# Patient Record
Sex: MALE | Race: WHITE | Employment: FULL TIME | URBAN - METROPOLITAN AREA
[De-identification: names, ages, dates, MRNs, and addresses within clinical notes are randomized per-mention and may not be internally consistent; named-entity substitution may affect disease eponyms.]

---

## 2021-09-27 ENCOUNTER — HOSPITAL ENCOUNTER (EMERGENCY)
Age: 51
Discharge: ANOTHER HOSPITAL | End: 2021-09-27
Attending: EMERGENCY MEDICINE

## 2021-09-27 ENCOUNTER — ANESTHESIA EVENT (OUTPATIENT)
Dept: OPERATING ROOM | Age: 51
DRG: 513 | End: 2021-09-27

## 2021-09-27 ENCOUNTER — HOSPITAL ENCOUNTER (INPATIENT)
Age: 51
LOS: 2 days | Discharge: HOME | DRG: 513 | End: 2021-09-29
Attending: ORTHOPAEDIC SURGERY | Admitting: FAMILY MEDICINE

## 2021-09-27 ENCOUNTER — APPOINTMENT (OUTPATIENT)
Dept: GENERAL RADIOLOGY | Age: 51
End: 2021-09-27

## 2021-09-27 ENCOUNTER — ANESTHESIA (OUTPATIENT)
Dept: OPERATING ROOM | Age: 51
DRG: 513 | End: 2021-09-27

## 2021-09-27 VITALS
RESPIRATION RATE: 18 BRPM | SYSTOLIC BLOOD PRESSURE: 139 MMHG | BODY MASS INDEX: 35.33 KG/M2 | HEIGHT: 69 IN | DIASTOLIC BLOOD PRESSURE: 87 MMHG | WEIGHT: 238.54 LBS | OXYGEN SATURATION: 97 % | HEART RATE: 97 BPM | TEMPERATURE: 98.9 F

## 2021-09-27 VITALS
DIASTOLIC BLOOD PRESSURE: 57 MMHG | OXYGEN SATURATION: 100 % | SYSTOLIC BLOOD PRESSURE: 93 MMHG | TEMPERATURE: 97.7 F | RESPIRATION RATE: 2 BRPM

## 2021-09-27 DIAGNOSIS — L03.012 CELLULITIS OF LEFT MIDDLE FINGER: ICD-10-CM

## 2021-09-27 DIAGNOSIS — E11.65 TYPE 2 DIABETES MELLITUS WITH HYPERGLYCEMIA, WITHOUT LONG-TERM CURRENT USE OF INSULIN (HCC): ICD-10-CM

## 2021-09-27 DIAGNOSIS — L02.512 ABSCESS OF HAND, LEFT: Primary | ICD-10-CM

## 2021-09-27 DIAGNOSIS — M65.142 SUPPURATIVE TENOSYNOVITIS OF FLEXOR TENDON OF LEFT HAND: ICD-10-CM

## 2021-09-27 DIAGNOSIS — L02.512 ABSCESS OF LEFT MIDDLE FINGER: Primary | ICD-10-CM

## 2021-09-27 PROBLEM — L03.114 CELLULITIS OF LEFT HAND: Status: ACTIVE | Noted: 2021-09-27

## 2021-09-27 LAB
A/G RATIO: 1 (ref 1.1–2.2)
ALBUMIN SERPL-MCNC: 4.1 G/DL (ref 3.4–5)
ALP BLD-CCNC: 95 U/L (ref 40–129)
ALT SERPL-CCNC: 13 U/L (ref 10–40)
ANION GAP SERPL CALCULATED.3IONS-SCNC: 13 MMOL/L (ref 3–16)
AST SERPL-CCNC: 15 U/L (ref 15–37)
BASOPHILS ABSOLUTE: 0 K/UL (ref 0–0.2)
BASOPHILS RELATIVE PERCENT: 0.6 %
BILIRUB SERPL-MCNC: 0.6 MG/DL (ref 0–1)
BUN BLDV-MCNC: 16 MG/DL (ref 7–20)
CALCIUM SERPL-MCNC: 9.9 MG/DL (ref 8.3–10.6)
CHLORIDE BLD-SCNC: 98 MMOL/L (ref 99–110)
CO2: 23 MMOL/L (ref 21–32)
CREAT SERPL-MCNC: 0.6 MG/DL (ref 0.9–1.3)
EOSINOPHILS ABSOLUTE: 0.2 K/UL (ref 0–0.6)
EOSINOPHILS RELATIVE PERCENT: 3 %
GFR AFRICAN AMERICAN: >60
GFR NON-AFRICAN AMERICAN: >60
GLOBULIN: 4.1 G/DL
GLUCOSE BLD-MCNC: 107 MG/DL (ref 70–99)
GLUCOSE BLD-MCNC: 265 MG/DL (ref 70–99)
GLUCOSE BLD-MCNC: 278 MG/DL (ref 70–99)
GLUCOSE BLD-MCNC: 281 MG/DL (ref 70–99)
GLUCOSE BLD-MCNC: 344 MG/DL (ref 70–99)
HCT VFR BLD CALC: 47.7 % (ref 40.5–52.5)
HEMOGLOBIN: 15.4 G/DL (ref 13.5–17.5)
LACTIC ACID: 1.3 MMOL/L (ref 0.4–2)
LYMPHOCYTES ABSOLUTE: 1.8 K/UL (ref 1–5.1)
LYMPHOCYTES RELATIVE PERCENT: 22.3 %
MCH RBC QN AUTO: 29.3 PG (ref 26–34)
MCHC RBC AUTO-ENTMCNC: 32.3 G/DL (ref 31–36)
MCV RBC AUTO: 90.8 FL (ref 80–100)
MONOCYTES ABSOLUTE: 0.4 K/UL (ref 0–1.3)
MONOCYTES RELATIVE PERCENT: 5 %
NEUTROPHILS ABSOLUTE: 5.8 K/UL (ref 1.7–7.7)
NEUTROPHILS RELATIVE PERCENT: 69.1 %
PDW BLD-RTO: 12.1 % (ref 12.4–15.4)
PERFORMED ON: ABNORMAL
PLATELET # BLD: 392 K/UL (ref 135–450)
PMV BLD AUTO: 6.8 FL (ref 5–10.5)
POTASSIUM SERPL-SCNC: 3.9 MMOL/L (ref 3.5–5.1)
RBC # BLD: 5.26 M/UL (ref 4.2–5.9)
SARS-COV-2, NAAT: NOT DETECTED
SODIUM BLD-SCNC: 134 MMOL/L (ref 136–145)
TOTAL PROTEIN: 8.2 G/DL (ref 6.4–8.2)
WBC # BLD: 8.2 K/UL (ref 4–11)

## 2021-09-27 PROCEDURE — 6360000002 HC RX W HCPCS: Performed by: NURSE PRACTITIONER

## 2021-09-27 PROCEDURE — 7100000001 HC PACU RECOVERY - ADDTL 15 MIN: Performed by: ORTHOPAEDIC SURGERY

## 2021-09-27 PROCEDURE — 87635 SARS-COV-2 COVID-19 AMP PRB: CPT

## 2021-09-27 PROCEDURE — 6370000000 HC RX 637 (ALT 250 FOR IP): Performed by: NURSE PRACTITIONER

## 2021-09-27 PROCEDURE — 2500000003 HC RX 250 WO HCPCS: Performed by: NURSE ANESTHETIST, CERTIFIED REGISTERED

## 2021-09-27 PROCEDURE — 6370000000 HC RX 637 (ALT 250 FOR IP): Performed by: FAMILY MEDICINE

## 2021-09-27 PROCEDURE — 96365 THER/PROPH/DIAG IV INF INIT: CPT

## 2021-09-27 PROCEDURE — 3600000013 HC SURGERY LEVEL 3 ADDTL 15MIN: Performed by: ORTHOPAEDIC SURGERY

## 2021-09-27 PROCEDURE — 2580000003 HC RX 258: Performed by: NURSE PRACTITIONER

## 2021-09-27 PROCEDURE — 2709999900 HC NON-CHARGEABLE SUPPLY: Performed by: ORTHOPAEDIC SURGERY

## 2021-09-27 PROCEDURE — 6360000002 HC RX W HCPCS: Performed by: EMERGENCY MEDICINE

## 2021-09-27 PROCEDURE — 87205 SMEAR GRAM STAIN: CPT

## 2021-09-27 PROCEDURE — 87070 CULTURE OTHR SPECIMN AEROBIC: CPT

## 2021-09-27 PROCEDURE — 73140 X-RAY EXAM OF FINGER(S): CPT

## 2021-09-27 PROCEDURE — 83605 ASSAY OF LACTIC ACID: CPT

## 2021-09-27 PROCEDURE — 6360000002 HC RX W HCPCS: Performed by: NURSE ANESTHETIST, CERTIFIED REGISTERED

## 2021-09-27 PROCEDURE — 26020 DRAIN HAND TENDON SHEATH: CPT | Performed by: ORTHOPAEDIC SURGERY

## 2021-09-27 PROCEDURE — 0JBK0ZZ EXCISION OF LEFT HAND SUBCUTANEOUS TISSUE AND FASCIA, OPEN APPROACH: ICD-10-PCS | Performed by: ORTHOPAEDIC SURGERY

## 2021-09-27 PROCEDURE — 87075 CULTR BACTERIA EXCEPT BLOOD: CPT

## 2021-09-27 PROCEDURE — 7100000000 HC PACU RECOVERY - FIRST 15 MIN: Performed by: ORTHOPAEDIC SURGERY

## 2021-09-27 PROCEDURE — 87077 CULTURE AEROBIC IDENTIFY: CPT

## 2021-09-27 PROCEDURE — 1200000000 HC SEMI PRIVATE

## 2021-09-27 PROCEDURE — 36415 COLL VENOUS BLD VENIPUNCTURE: CPT

## 2021-09-27 PROCEDURE — 85025 COMPLETE CBC W/AUTO DIFF WBC: CPT

## 2021-09-27 PROCEDURE — 87186 SC STD MICRODIL/AGAR DIL: CPT

## 2021-09-27 PROCEDURE — 6370000000 HC RX 637 (ALT 250 FOR IP): Performed by: ORTHOPAEDIC SURGERY

## 2021-09-27 PROCEDURE — 80053 COMPREHEN METABOLIC PANEL: CPT

## 2021-09-27 PROCEDURE — 2580000003 HC RX 258: Performed by: EMERGENCY MEDICINE

## 2021-09-27 PROCEDURE — 3700000001 HC ADD 15 MINUTES (ANESTHESIA): Performed by: ORTHOPAEDIC SURGERY

## 2021-09-27 PROCEDURE — 87040 BLOOD CULTURE FOR BACTERIA: CPT

## 2021-09-27 PROCEDURE — 6360000002 HC RX W HCPCS: Performed by: ORTHOPAEDIC SURGERY

## 2021-09-27 PROCEDURE — 99222 1ST HOSP IP/OBS MODERATE 55: CPT | Performed by: ORTHOPAEDIC SURGERY

## 2021-09-27 PROCEDURE — 99284 EMERGENCY DEPT VISIT MOD MDM: CPT

## 2021-09-27 PROCEDURE — 3600000003 HC SURGERY LEVEL 3 BASE: Performed by: ORTHOPAEDIC SURGERY

## 2021-09-27 PROCEDURE — 3700000000 HC ANESTHESIA ATTENDED CARE: Performed by: ORTHOPAEDIC SURGERY

## 2021-09-27 PROCEDURE — 2580000003 HC RX 258: Performed by: ORTHOPAEDIC SURGERY

## 2021-09-27 PROCEDURE — 87076 CULTURE ANAEROBE IDENT EACH: CPT

## 2021-09-27 RX ORDER — MEPERIDINE HYDROCHLORIDE 25 MG/ML
12.5 INJECTION INTRAMUSCULAR; INTRAVENOUS; SUBCUTANEOUS EVERY 5 MIN PRN
Status: DISCONTINUED | OUTPATIENT
Start: 2021-09-27 | End: 2021-09-27

## 2021-09-27 RX ORDER — SODIUM CHLORIDE 0.9 % (FLUSH) 0.9 %
10 SYRINGE (ML) INJECTION EVERY 12 HOURS SCHEDULED
Status: DISCONTINUED | OUTPATIENT
Start: 2021-09-27 | End: 2021-09-29 | Stop reason: HOSPADM

## 2021-09-27 RX ORDER — INSULIN LISPRO 100 [IU]/ML
0-3 INJECTION, SOLUTION INTRAVENOUS; SUBCUTANEOUS NIGHTLY
Status: DISCONTINUED | OUTPATIENT
Start: 2021-09-27 | End: 2021-09-29 | Stop reason: HOSPADM

## 2021-09-27 RX ORDER — ONDANSETRON 2 MG/ML
4 INJECTION INTRAMUSCULAR; INTRAVENOUS EVERY 6 HOURS PRN
Status: DISCONTINUED | OUTPATIENT
Start: 2021-09-27 | End: 2021-09-29 | Stop reason: HOSPADM

## 2021-09-27 RX ORDER — FENTANYL CITRATE 50 UG/ML
INJECTION, SOLUTION INTRAMUSCULAR; INTRAVENOUS PRN
Status: DISCONTINUED | OUTPATIENT
Start: 2021-09-27 | End: 2021-09-27 | Stop reason: SDUPTHER

## 2021-09-27 RX ORDER — SODIUM CHLORIDE 0.9 % (FLUSH) 0.9 %
10 SYRINGE (ML) INJECTION PRN
Status: DISCONTINUED | OUTPATIENT
Start: 2021-09-27 | End: 2021-09-29 | Stop reason: HOSPADM

## 2021-09-27 RX ORDER — CEPHALEXIN 500 MG/1
CAPSULE ORAL
Status: ON HOLD | COMMUNITY
Start: 2021-09-20 | End: 2021-09-27

## 2021-09-27 RX ORDER — PROPOFOL 10 MG/ML
INJECTION, EMULSION INTRAVENOUS PRN
Status: DISCONTINUED | OUTPATIENT
Start: 2021-09-27 | End: 2021-09-27 | Stop reason: SDUPTHER

## 2021-09-27 RX ORDER — PROMETHAZINE HYDROCHLORIDE 25 MG/ML
6.25 INJECTION, SOLUTION INTRAMUSCULAR; INTRAVENOUS PRN
Status: DISCONTINUED | OUTPATIENT
Start: 2021-09-27 | End: 2021-09-27

## 2021-09-27 RX ORDER — DEXTROSE MONOHYDRATE 50 MG/ML
100 INJECTION, SOLUTION INTRAVENOUS PRN
Status: DISCONTINUED | OUTPATIENT
Start: 2021-09-27 | End: 2021-09-29 | Stop reason: HOSPADM

## 2021-09-27 RX ORDER — INSULIN LISPRO 100 [IU]/ML
0.08 INJECTION, SOLUTION INTRAVENOUS; SUBCUTANEOUS
Status: DISCONTINUED | OUTPATIENT
Start: 2021-09-27 | End: 2021-09-29 | Stop reason: HOSPADM

## 2021-09-27 RX ORDER — PROMETHAZINE HYDROCHLORIDE 25 MG/1
12.5 TABLET ORAL EVERY 6 HOURS PRN
Status: DISCONTINUED | OUTPATIENT
Start: 2021-09-27 | End: 2021-09-29 | Stop reason: HOSPADM

## 2021-09-27 RX ORDER — CEFAZOLIN SODIUM 1 G/3ML
INJECTION, POWDER, FOR SOLUTION INTRAMUSCULAR; INTRAVENOUS PRN
Status: DISCONTINUED | OUTPATIENT
Start: 2021-09-27 | End: 2021-09-27 | Stop reason: SDUPTHER

## 2021-09-27 RX ORDER — BACITRACIN ZINC AND POLYMYXIN B SULFATE 500; 1000 [USP'U]/G; [USP'U]/G
OINTMENT TOPICAL
Status: COMPLETED | OUTPATIENT
Start: 2021-09-27 | End: 2021-09-27

## 2021-09-27 RX ORDER — 0.9 % SODIUM CHLORIDE 0.9 %
1000 INTRAVENOUS SOLUTION INTRAVENOUS ONCE
Status: COMPLETED | OUTPATIENT
Start: 2021-09-27 | End: 2021-09-27

## 2021-09-27 RX ORDER — OXYCODONE HYDROCHLORIDE 5 MG/1
5 TABLET ORAL PRN
Status: DISCONTINUED | OUTPATIENT
Start: 2021-09-27 | End: 2021-09-27

## 2021-09-27 RX ORDER — FENTANYL CITRATE 50 UG/ML
50 INJECTION, SOLUTION INTRAMUSCULAR; INTRAVENOUS EVERY 5 MIN PRN
Status: DISCONTINUED | OUTPATIENT
Start: 2021-09-27 | End: 2021-09-27

## 2021-09-27 RX ORDER — SODIUM CHLORIDE 9 MG/ML
INJECTION, SOLUTION INTRAVENOUS CONTINUOUS
Status: DISCONTINUED | OUTPATIENT
Start: 2021-09-27 | End: 2021-09-28

## 2021-09-27 RX ORDER — DIPHENHYDRAMINE HYDROCHLORIDE 50 MG/ML
12.5 INJECTION INTRAMUSCULAR; INTRAVENOUS
Status: DISCONTINUED | OUTPATIENT
Start: 2021-09-27 | End: 2021-09-27

## 2021-09-27 RX ORDER — SODIUM CHLORIDE 9 MG/ML
25 INJECTION, SOLUTION INTRAVENOUS PRN
Status: DISCONTINUED | OUTPATIENT
Start: 2021-09-27 | End: 2021-09-29 | Stop reason: HOSPADM

## 2021-09-27 RX ORDER — ACETAMINOPHEN 325 MG/1
650 TABLET ORAL 3 TIMES DAILY
Status: DISCONTINUED | OUTPATIENT
Start: 2021-09-27 | End: 2021-09-29 | Stop reason: HOSPADM

## 2021-09-27 RX ORDER — LIDOCAINE HYDROCHLORIDE 20 MG/ML
INJECTION, SOLUTION EPIDURAL; INFILTRATION; INTRACAUDAL; PERINEURAL PRN
Status: DISCONTINUED | OUTPATIENT
Start: 2021-09-27 | End: 2021-09-27 | Stop reason: SDUPTHER

## 2021-09-27 RX ORDER — HYDROMORPHONE HCL 110MG/55ML
0.5 PATIENT CONTROLLED ANALGESIA SYRINGE INTRAVENOUS EVERY 5 MIN PRN
Status: DISCONTINUED | OUTPATIENT
Start: 2021-09-27 | End: 2021-09-27

## 2021-09-27 RX ORDER — HYDROMORPHONE HCL 110MG/55ML
0.25 PATIENT CONTROLLED ANALGESIA SYRINGE INTRAVENOUS EVERY 5 MIN PRN
Status: DISCONTINUED | OUTPATIENT
Start: 2021-09-27 | End: 2021-09-27

## 2021-09-27 RX ORDER — LABETALOL HYDROCHLORIDE 5 MG/ML
5 INJECTION, SOLUTION INTRAVENOUS EVERY 10 MIN PRN
Status: DISCONTINUED | OUTPATIENT
Start: 2021-09-27 | End: 2021-09-27

## 2021-09-27 RX ORDER — DEXTROSE MONOHYDRATE 25 G/50ML
12.5 INJECTION, SOLUTION INTRAVENOUS PRN
Status: DISCONTINUED | OUTPATIENT
Start: 2021-09-27 | End: 2021-09-29 | Stop reason: HOSPADM

## 2021-09-27 RX ORDER — MIDAZOLAM HYDROCHLORIDE 1 MG/ML
INJECTION INTRAMUSCULAR; INTRAVENOUS PRN
Status: DISCONTINUED | OUTPATIENT
Start: 2021-09-27 | End: 2021-09-27 | Stop reason: SDUPTHER

## 2021-09-27 RX ORDER — OXYCODONE HYDROCHLORIDE 5 MG/1
10 TABLET ORAL PRN
Status: DISCONTINUED | OUTPATIENT
Start: 2021-09-27 | End: 2021-09-27

## 2021-09-27 RX ORDER — SULFAMETHOXAZOLE AND TRIMETHOPRIM 800; 160 MG/1; MG/1
TABLET ORAL
Status: ON HOLD | COMMUNITY
Start: 2021-09-20 | End: 2021-09-27

## 2021-09-27 RX ORDER — INSULIN LISPRO 100 [IU]/ML
0-6 INJECTION, SOLUTION INTRAVENOUS; SUBCUTANEOUS
Status: DISCONTINUED | OUTPATIENT
Start: 2021-09-27 | End: 2021-09-29 | Stop reason: HOSPADM

## 2021-09-27 RX ORDER — HYDROCODONE BITARTRATE AND ACETAMINOPHEN 10; 325 MG/1; MG/1
1 TABLET ORAL EVERY 4 HOURS PRN
Status: DISCONTINUED | OUTPATIENT
Start: 2021-09-27 | End: 2021-09-29 | Stop reason: HOSPADM

## 2021-09-27 RX ORDER — METOPROLOL TARTRATE 5 MG/5ML
INJECTION INTRAVENOUS PRN
Status: DISCONTINUED | OUTPATIENT
Start: 2021-09-27 | End: 2021-09-27 | Stop reason: SDUPTHER

## 2021-09-27 RX ORDER — POLYETHYLENE GLYCOL 3350 17 G/17G
17 POWDER, FOR SOLUTION ORAL DAILY PRN
Status: DISCONTINUED | OUTPATIENT
Start: 2021-09-27 | End: 2021-09-29 | Stop reason: HOSPADM

## 2021-09-27 RX ORDER — NICOTINE POLACRILEX 4 MG
15 LOZENGE BUCCAL PRN
Status: DISCONTINUED | OUTPATIENT
Start: 2021-09-27 | End: 2021-09-29 | Stop reason: HOSPADM

## 2021-09-27 RX ADMIN — SODIUM CHLORIDE 1000 ML: 9 INJECTION, SOLUTION INTRAVENOUS at 11:01

## 2021-09-27 RX ADMIN — HYDROCODONE BITARTRATE AND ACETAMINOPHEN 1 TABLET: 10; 325 TABLET ORAL at 21:59

## 2021-09-27 RX ADMIN — SODIUM CHLORIDE: 9 INJECTION, SOLUTION INTRAVENOUS at 14:49

## 2021-09-27 RX ADMIN — MIDAZOLAM 2 MG: 1 INJECTION INTRAMUSCULAR; INTRAVENOUS at 14:46

## 2021-09-27 RX ADMIN — METOPROLOL TARTRATE 1 MG: 1 INJECTION, SOLUTION INTRAVENOUS at 14:48

## 2021-09-27 RX ADMIN — INSULIN GLARGINE 16 UNITS: 100 INJECTION, SOLUTION SUBCUTANEOUS at 22:16

## 2021-09-27 RX ADMIN — ACETAMINOPHEN 650 MG: 325 TABLET ORAL at 17:30

## 2021-09-27 RX ADMIN — INSULIN HUMAN 10 UNITS: 100 INJECTION, SOLUTION PARENTERAL at 15:26

## 2021-09-27 RX ADMIN — CEFAZOLIN 2000 MG: 1 INJECTION, POWDER, FOR SOLUTION INTRAVENOUS at 15:06

## 2021-09-27 RX ADMIN — SODIUM CHLORIDE 100 ML: 9 INJECTION, SOLUTION INTRAVENOUS at 14:34

## 2021-09-27 RX ADMIN — CEFAZOLIN 2000 MG: 10 INJECTION, POWDER, FOR SOLUTION INTRAVENOUS at 22:02

## 2021-09-27 RX ADMIN — VANCOMYCIN HYDROCHLORIDE 1000 MG: 1 INJECTION, POWDER, LYOPHILIZED, FOR SOLUTION INTRAVENOUS at 11:05

## 2021-09-27 RX ADMIN — Medication 10 ML: at 21:59

## 2021-09-27 RX ADMIN — SODIUM CHLORIDE: 9 INJECTION, SOLUTION INTRAVENOUS at 16:24

## 2021-09-27 RX ADMIN — PROPOFOL 150 MG: 10 INJECTION, EMULSION INTRAVENOUS at 14:50

## 2021-09-27 RX ADMIN — SODIUM CHLORIDE: 9 INJECTION, SOLUTION INTRAVENOUS at 18:26

## 2021-09-27 RX ADMIN — INSULIN LISPRO 8 UNITS: 100 INJECTION, SOLUTION INTRAVENOUS; SUBCUTANEOUS at 18:11

## 2021-09-27 RX ADMIN — FENTANYL CITRATE 100 MCG: 50 INJECTION, SOLUTION INTRAMUSCULAR; INTRAVENOUS at 14:49

## 2021-09-27 RX ADMIN — INSULIN LISPRO 3 UNITS: 100 INJECTION, SOLUTION INTRAVENOUS; SUBCUTANEOUS at 17:31

## 2021-09-27 RX ADMIN — LIDOCAINE HYDROCHLORIDE 100 MG: 20 INJECTION, SOLUTION EPIDURAL; INFILTRATION; INTRACAUDAL; PERINEURAL at 14:50

## 2021-09-27 ASSESSMENT — PULMONARY FUNCTION TESTS
PIF_VALUE: 9
PIF_VALUE: 4
PIF_VALUE: 3
PIF_VALUE: 4
PIF_VALUE: 0
PIF_VALUE: 1
PIF_VALUE: 4
PIF_VALUE: 7
PIF_VALUE: 3
PIF_VALUE: 14
PIF_VALUE: 4
PIF_VALUE: 13
PIF_VALUE: 3
PIF_VALUE: 4
PIF_VALUE: 3
PIF_VALUE: 2
PIF_VALUE: 9

## 2021-09-27 ASSESSMENT — PAIN SCALES - GENERAL
PAINLEVEL_OUTOF10: 7
PAINLEVEL_OUTOF10: 0
PAINLEVEL_OUTOF10: 0
PAINLEVEL_OUTOF10: 4
PAINLEVEL_OUTOF10: 3
PAINLEVEL_OUTOF10: 0
PAINLEVEL_OUTOF10: 2

## 2021-09-27 ASSESSMENT — PAIN DESCRIPTION - PROGRESSION
CLINICAL_PROGRESSION: GRADUALLY WORSENING
CLINICAL_PROGRESSION: GRADUALLY IMPROVING
CLINICAL_PROGRESSION: NOT CHANGED

## 2021-09-27 ASSESSMENT — PAIN DESCRIPTION - LOCATION
LOCATION: HAND
LOCATION: FINGER (COMMENT WHICH ONE)
LOCATION: HAND

## 2021-09-27 ASSESSMENT — PAIN DESCRIPTION - ONSET
ONSET: ON-GOING
ONSET: ON-GOING

## 2021-09-27 ASSESSMENT — PAIN DESCRIPTION - DESCRIPTORS
DESCRIPTORS: ACHING
DESCRIPTORS: BURNING
DESCRIPTORS: SORE
DESCRIPTORS: ACHING

## 2021-09-27 ASSESSMENT — PAIN - FUNCTIONAL ASSESSMENT
PAIN_FUNCTIONAL_ASSESSMENT: 0-10
PAIN_FUNCTIONAL_ASSESSMENT: PREVENTS OR INTERFERES SOME ACTIVE ACTIVITIES AND ADLS
PAIN_FUNCTIONAL_ASSESSMENT: PREVENTS OR INTERFERES SOME ACTIVE ACTIVITIES AND ADLS

## 2021-09-27 ASSESSMENT — PAIN DESCRIPTION - PAIN TYPE
TYPE: ACUTE PAIN
TYPE: SURGICAL PAIN
TYPE: ACUTE PAIN

## 2021-09-27 ASSESSMENT — PAIN DESCRIPTION - ORIENTATION: ORIENTATION: LEFT

## 2021-09-27 ASSESSMENT — PAIN DESCRIPTION - FREQUENCY: FREQUENCY: INTERMITTENT

## 2021-09-27 NOTE — BRIEF OP NOTE
Brief Postoperative Note      Patient: Eber Argueta  YOB: 1970  MRN: [id number]    Date of Procedure: 9/27/2021    Pre-Op Diagnosis: M65.9  LEFT HAND ABSCESS/ FLEXOR TENOSYNOVITIS. Post-Op Diagnosis: Same       Procedure(s):  LEFT HAND INCISION AND DRAINAGE MIDDLE FINGER FLEXOR TENOSYNOVITIS. Surgeon(s):  Haroon Stout MD    Assistant:  First Assistant: Jaimie Celaya    Anesthesia: General    Estimated Blood Loss (mL): Minimal    Complications: None    Specimens:   ID Type Source Tests Collected by Time Destination   1 : 1.aerobic and anaerobic cultures left hand middle finger  Tissue Tissue CULTURE, TISSUE (Canceled) Haroon Stout MD 9/27/2021 1505        Implants:  * No implants in log *      Drains: * No LDAs found *    Findings: Same.     Electronically signed by Haroon Stout MD on 9/27/2021 at 5:27 PM

## 2021-09-27 NOTE — ANESTHESIA POSTPROCEDURE EVALUATION
Department of Anesthesiology  Postprocedure Note    Patient: Radha Galindo  MRN: [id number]  YOB: 1970  Date of evaluation: 9/27/2021  Time:  3:20 PM     Procedure Summary     Date: 09/27/21 Room / Location: [address]    Anesthesia Start: 4002 Anesthesia Stop: 1518    Procedure: LEFT HAND INCISION AND DRAINAGE MIDDLE FINGER (Left ) Diagnosis: (M65.9  LEFT HAND ABSCESS/TENOSYNVITIS)    Surgeons: Merlinda Goring, MD Responsible Provider: Mariana Moulton MD    Anesthesia Type: general ASA Status: 2          Anesthesia Type: general    Bhavya Phase I: Bhavya Score: 10    Bhavya Phase II:      Last vitals: Reviewed and per EMR flowsheets.        Anesthesia Post Evaluation    Level of consciousness: awake  Complications: no

## 2021-09-27 NOTE — ANESTHESIA PRE PROCEDURE
Department of Anesthesiology  Preprocedure Note       Name:  Aranza Gonzalez   Age:  46 y.o.  :  1970                                          MRN:  [id number]         Date:  2021      Surgeon: Carlota Durán):  Azeb Oconnor MD    Procedure: Procedure(s):  LEFT HAND INCISION AND DRAINAGE    Medications prior to admission:   Prior to Admission medications    Medication Sig Start Date End Date Taking?  Authorizing Provider   cephALEXin (KEFLEX) 500 MG capsule TAKE ONE CAPSULE BY MOUTH THREE TIMES DAILY 21   Historical Provider, MD   sulfamethoxazole-trimethoprim (BACTRIM DS;SEPTRA DS) 800-160 MG per tablet Take 1 tablet by mouth every 12 hours 21   Historical Provider, MD       Current medications:    Current Facility-Administered Medications   Medication Dose Route Frequency Provider Last Rate Last Admin    meperidine (DEMEROL) injection 12.5 mg  12.5 mg IntraVENous Q5 Min PRN Guanakito Faust MD        HYDROmorphone (DILAUDID) injection 0.25 mg  0.25 mg IntraVENous Q5 Min PRN Guanakito Faust MD        fentaNYL (SUBLIMAZE) injection 50 mcg  50 mcg IntraVENous Q5 Min PRN Guanakito Faust MD        HYDROmorphone (DILAUDID) injection 0.25 mg  0.25 mg IntraVENous Q5 Min PRN Guanakito Faust MD        HYDROmorphone (DILAUDID) injection 0.5 mg  0.5 mg IntraVENous Q5 Min PRN Guanakito Faust MD        oxyCODONE (ROXICODONE) immediate release tablet 5 mg  5 mg Oral PRN Guanakito Faust MD        Or    oxyCODONE (ROXICODONE) immediate release tablet 10 mg  10 mg Oral PRN Guanakito Faust MD        promethazine (PHENERGAN) injection 6.25 mg  6.25 mg IntraVENous PRN Guanakito Faust MD        diphenhydrAMINE (BENADRYL) injection 12.5 mg  12.5 mg IntraVENous Once PRN Guanakito Faust MD        labetalol (NORMODYNE;TRANDATE) injection 5 mg  5 mg IntraVENous Q10 Min PRN Guanakito Faust MD           Allergies:  No Known Allergies    Problem List:    Patient Active Problem List   Diagnosis Code    Cellulitis of left hand L03.114       Past Medical History:  No past medical history on file. Past Surgical History:        Procedure Laterality Date    HERNIA REPAIR         Social History:    Social History     Tobacco Use    Smoking status: Former Smoker    Smokeless tobacco: Current User     Types: Snuff   Substance Use Topics    Alcohol use: Yes     Alcohol/week: 4.0 standard drinks     Types: 4 Cans of beer per week                                Ready to quit: Not Answered  Counseling given: Not Answered      Vital Signs (Current): There were no vitals filed for this visit. BP Readings from Last 3 Encounters:   09/27/21 139/87       NPO Status:                                                                                 BMI:   Wt Readings from Last 3 Encounters:   09/27/21 238 lb 8.6 oz (108.2 kg)     There is no height or weight on file to calculate BMI.    CBC:   Lab Results   Component Value Date    WBC 8.2 09/27/2021    RBC 5.26 09/27/2021    HGB 15.4 09/27/2021    HCT 47.7 09/27/2021    MCV 90.8 09/27/2021    RDW 12.1 09/27/2021     09/27/2021       CMP:   Lab Results   Component Value Date     09/27/2021    K 3.9 09/27/2021    CL 98 09/27/2021    CO2 23 09/27/2021    BUN 16 09/27/2021    CREATININE 0.6 09/27/2021    GFRAA >60 09/27/2021    GFRAA >60 05/29/2012    AGRATIO 1.0 09/27/2021    LABGLOM >60 09/27/2021    GLUCOSE 281 09/27/2021    PROT 8.2 09/27/2021    PROT 8.1 05/29/2012    CALCIUM 9.9 09/27/2021    BILITOT 0.6 09/27/2021    ALKPHOS 95 09/27/2021    AST 15 09/27/2021    ALT 13 09/27/2021       POC Tests: No results for input(s): POCGLU, POCNA, POCK, POCCL, POCBUN, POCHEMO, POCHCT in the last 72 hours.     Coags: No results found for: PROTIME, INR, APTT    HCG (If Applicable): No results found for: PREGTESTUR, PREGSERUM, HCG, HCGQUANT     ABGs: No results found for: PHART, PO2ART, RQS2XZO, WEK8RCO, BEART, E8CRBWDU     Type & Screen (If Applicable):  No results found for: LABABO, LABRH    Drug/Infectious Status (If Applicable):  No results found for: HIV, HEPCAB    COVID-19 Screening (If Applicable): No results found for: COVID19        Anesthesia Evaluation    Airway: Mallampati: II  TM distance: >3 FB   Neck ROM: full  Mouth opening: > = 3 FB Dental:          Pulmonary:                              Cardiovascular:            Rhythm: regular  Rate: normal                    Neuro/Psych:               GI/Hepatic/Renal:             Endo/Other:                     Abdominal:             Vascular: Other Findings: Hands are dirty, evidence of PPN. Anesthesia Plan      general     ASA 3 - emergent     (Uncontrolled DM. Had glucose nearly 300 mg/dL in 2012 and does not treat it. I spoke with Dr. Ceola Goldmann who will admit and have IM see. I ordered 10 units subcutaneous x 1 regular insulin. Will give IVF x 1 liter.)  Induction: intravenous. Anesthetic plan and risks discussed with patient. Plan discussed with CRNA.                 Mercedes Taylor MD   9/27/2021

## 2021-09-27 NOTE — H&P
Preoperative H&P Update    The patient's History and Physical in the medical record from 9/27/2021 was reviewed by me today. History reviewed. No pertinent past medical history. Past Surgical History:   Procedure Laterality Date    HERNIA REPAIR       No current facility-administered medications on file prior to encounter. Current Outpatient Medications on File Prior to Encounter   Medication Sig Dispense Refill    cephALEXin (KEFLEX) 500 MG capsule TAKE ONE CAPSULE BY MOUTH THREE TIMES DAILY      sulfamethoxazole-trimethoprim (BACTRIM DS;SEPTRA DS) 800-160 MG per tablet Take 1 tablet by mouth every 12 hours         No Known Allergies   I reviewed the HPI, medications, allergies, reason for surgery, diagnosis and treatment plan and there has been no change. The patient was evaluated by me today. Physical exam findings for this update include: There were no vitals filed for this visit. Airway is intact  Chest: chest clear, no wheezing, rales, normal symmetric air entry, no tachypnea, retractions or cyanosis  Heart: regular rate and rhythm ; heart sounds normal  Findings on exam of the body region where surgery is to be performed include:  Left hand long finger flexor tenosynovitis.     Electronically signed by Cortez Fox MD on 9/27/2021 at 2:24 PM

## 2021-09-27 NOTE — ADDENDUM NOTE
Addendum  created 09/27/21 1527 by Naheed Livingston MD    Actions taken from a BestPractice Advisory, Clinical Note Signed, Order list changed

## 2021-09-27 NOTE — CONSULTS
[facility] Orthopedic Surgery  Consult Note         This patient is seen in consultation at the request of Dr Angle Muse MD    Reason for Consult:  Left hand pain/ long (middle) finger deep infection/ flexor tenosynovitis with abscess. CHIEF COMPLAINT:  Left hand pain/ long (middle) finger deep infection. History Obtained From:  patient, electronic medical record    HISTORY OF PRESENT ILLNESS:    Mr. Shahid Carrasquillo 46 y.o.  male right handed presents today for the first visit for evaluation of a left hand pain with no known injury which started around 10 days ago and went to Urgent Care on Monday, given Keflex and Bactrim with no improvment. He is complaining of long (middle) finger pain and swelling. This is better with elevation and worse with ROM. The pain is sharp and radiating to the hand. No other complaint. He was seen at [facility] ED, was evaluated and I was consulted. Past Medical History:    History reviewed. No pertinent past medical history. Past Surgical History:        Procedure Laterality Date    HERNIA REPAIR         Medications prior to admission:   Prior to Admission medications    Medication Sig Start Date End Date Taking?  Authorizing Provider   cephALEXin (KEFLEX) 500 MG capsule TAKE ONE CAPSULE BY MOUTH THREE TIMES DAILY 9/20/21   Historical Provider, MD   sulfamethoxazole-trimethoprim (BACTRIM DS;SEPTRA DS) 800-160 MG per tablet Take 1 tablet by mouth every 12 hours 9/20/21   Historical Provider, MD       Current Medications:   Current Facility-Administered Medications: meperidine (DEMEROL) injection 12.5 mg, 12.5 mg, IntraVENous, Q5 Min PRN  HYDROmorphone (DILAUDID) injection 0.25 mg, 0.25 mg, IntraVENous, Q5 Min PRN  fentaNYL (SUBLIMAZE) injection 50 mcg, 50 mcg, IntraVENous, Q5 Min PRN  HYDROmorphone (DILAUDID) injection 0.25 mg, 0.25 mg, IntraVENous, Q5 Min PRN  HYDROmorphone (DILAUDID) injection 0.5 mg, 0.5 mg, IntraVENous, Q5 Min PRN  oxyCODONE (Shaarth Tamiko) immediate release tablet 5 mg, 5 mg, Oral, PRN **OR** oxyCODONE (ROXICODONE) immediate release tablet 10 mg, 10 mg, Oral, PRN  promethazine (PHENERGAN) injection 6.25 mg, 6.25 mg, IntraVENous, PRN  diphenhydrAMINE (BENADRYL) injection 12.5 mg, 12.5 mg, IntraVENous, Once PRN  labetalol (NORMODYNE;TRANDATE) injection 5 mg, 5 mg, IntraVENous, Q10 Min PRN  0.9 % sodium chloride infusion, 25 mL, IntraVENous, PRN    Allergies:  Patient has no known allergies. Social History     Socioeconomic History    Marital status: Single     Spouse name: Not on file    Number of children: Not on file    Years of education: Not on file    Highest education level: Not on file   Occupational History    Not on file   Tobacco Use    Smoking status: Former Smoker    Smokeless tobacco: Current User     Types: Snuff   Vaping Use    Vaping Use: Never used   Substance and Sexual Activity    Alcohol use: Yes     Alcohol/week: 4.0 standard drinks     Types: 4 Cans of beer per week    Drug use: Never    Sexual activity: Not on file   Other Topics Concern    Not on file   Social History Narrative    Not on file     Social Determinants of Health     Financial Resource Strain:     Difficulty of Paying Living Expenses:    Food Insecurity:     Worried About Running Out of Food in the Last Year:     [address] in the Last Year:    Transportation Needs:     Lack of Transportation (Medical):      Lack of Transportation (Non-Medical):    Physical Activity:     Days of Exercise per Week:     Minutes of Exercise per Session:    Stress:     Feeling of Stress :    Social Connections:     Frequency of Communication with Friends and Family:     Frequency of Social Gatherings with Friends and Family:     Attends Denominational Services:     Active Member of Clubs or Organizations:     Attends Club or Organization Meetings:     Marital Status:    Intimate Partner Violence:     Fear of Current or Ex-Partner:     Emotionally Abused: collection of the long (middle) finger c/w deep abscess and flexor tenosynovitis. He has intact sensation and good radial pulses. He has significant tenderness on deep palpation over the proximal, mid and distal phalanx of the long (middle) finger left hand. NEUROLOGIC:   Sensory:    Touch:                     Right Upper Extremity:  normal                   Left Upper Extremity:  normal                  Right Lower Extremity:  normal                  Left Lower Extremity:  normal                DATA:    CBC:   Lab Results   Component Value Date    WBC 8.2 09/27/2021    RBC 5.26 09/27/2021    HGB 15.4 09/27/2021    HCT 47.7 09/27/2021    MCV 90.8 09/27/2021    MCH 29.3 09/27/2021    MCHC 32.3 09/27/2021    RDW 12.1 09/27/2021     09/27/2021    MPV 6.8 09/27/2021     WBC:    Lab Results   Component Value Date    WBC 8.2 09/27/2021     PT/INR:  No results found for: PROTIME, INR  PTT:  No results found for: APTT[APTT    IMAGING: Xray's were reviewed, dated 9/27/2021,  3 views of the left hand, and showed no fracture or FB, soft tissue swelling left long finger. IMPRESSION: Left hand pain/ long (middle) finger deep infection/ flexor tenosynovitis with abscess. PLAN:  I discussed with Baudilio Carpenter the treatment options including both surgical and non-surgical treatment, and that my recommendation is an open I&D left hand long finger deep infection. I discussed the risks and benefits of surgery with the patient, including but not limited to infection, bleeding, pain, injury to nerves or blood vessels failure of the surgery and need for additional surgery. All the patient's questions were answered. We discussed an expected post-operative course. He  is understanding of this and wishes to proceed. Thank you very much for the kind consultation and allowing me to participate in this patient's care. I will continue to keep you apprised of his progress.         Joel Day MD   9/27/2021  2:15 PM

## 2021-09-27 NOTE — ED TRIAGE NOTES
Patient to EMERGENCY DEPARTMENT ambulatory complains of redness, swelling, starting of blistering and drainage to let middle finger. States seen at [address] on 9/20 an startd on antibiotics with X-ray on 9/21/21 and never heard results of X-ray.  Not sure if maybe a splinter or piece of metal in finger- pain started on 9/18/21 to left middle finger

## 2021-09-27 NOTE — ED PROVIDER NOTES
Vitals [09/27/21 1011]   Enc Vitals Group      BP (!) 178/97      Pulse 115      Resp 18      Temp 98.3 °F (36.8 °C)      Temp Source Oral      SpO2 98 %      Weight 238 lb 8.6 oz (108.2 kg)      Height 5' 9\" (1.753 m)      Head Circumference       Peak Flow       Pain Score       Pain Loc       Pain Edu? Excl. in [address]? HEAD: Normocephalic, atraumatic. EYES:  Extraocular muscles are intact. Conjunctivas are pink. Negative scleral icterus. ENT:  Mucous membranes are moist.  Pharynx without erythema or exudates. NECK: Nontender and supple. CHEST: Clear to auscultation bilaterally. No rales, rhonchi, or wheezing. HEART:  tachycrdic rate and rhythm. No murmurs. Strong and equal pulses in the upper and lower extremities. ABDOMEN: Soft,  nondistended, positive bowel sounds. abdomen is nontender. MUSCULOSKELETAL:  Active range of motion of the upper and lower extremities. No edema. Left middle finger with erythema and swelling from middle phalanx to distal phalanx circumferentailly. White discoloration to distal phalanx over pulp. Purulent drainge noted from DIP joint palmar aspect  NEUROLOGICAL: Awake, alert and oriented x 3. Power intact in the upper and lower extremities. DERMATOLOGIC: No petechiae, rashes, or ecchymoses. ED COURSE AND MEDICAL DECISION MAKING:      Radiology:  All plain films have been evaluated by myself. They may have been overread by radiologist as noted in chart. Other radiologic studies (i.e. CT, MRI, ultrasounds, etc ) have been interpreted by radiologist.     XR FINGER LEFT (MIN 2 VIEWS)   Final Result   Diffuse soft tissue swelling of the left 3rd finger with no soft tissue gas   or foreign body. No acute osseous abnormality.              Labs:  Results for orders placed or performed during the hospital encounter of 09/27/21   CBC Auto Differential   Result Value Ref Range    WBC 8.2 4.0 - 11.0 K/uL    RBC 5.26 4.20 - 5.90 M/uL    Hemoglobin 15.4 13.5 - 17.5 g/dL Hematocrit 47.7 40.5 - 52.5 %    MCV 90.8 80.0 - 100.0 fL    MCH 29.3 26.0 - 34.0 pg    MCHC 32.3 31.0 - 36.0 g/dL    RDW 12.1 (L) 12.4 - 15.4 %    Platelets 805 399 - 302 K/uL    MPV 6.8 5.0 - 10.5 fL    Neutrophils % 69.1 %    Lymphocytes % 22.3 %    Monocytes % 5.0 %    Eosinophils % 3.0 %    Basophils % 0.6 %    Neutrophils Absolute 5.8 1.7 - 7.7 K/uL    Lymphocytes Absolute 1.8 1.0 - 5.1 K/uL    Monocytes Absolute 0.4 0.0 - 1.3 K/uL    Eosinophils Absolute 0.2 0.0 - 0.6 K/uL    Basophils Absolute 0.0 0.0 - 0.2 K/uL   Comprehensive Metabolic Panel   Result Value Ref Range    Sodium 134 (L) 136 - 145 mmol/L    Potassium 3.9 3.5 - 5.1 mmol/L    Chloride 98 (L) 99 - 110 mmol/L    CO2 23 21 - 32 mmol/L    Anion Gap 13 3 - 16    Glucose 281 (H) 70 - 99 mg/dL    BUN 16 7 - 20 mg/dL    CREATININE 0.6 (L) 0.9 - 1.3 mg/dL    GFR Non-African American >60 >60    GFR African American >60 >60    Calcium 9.9 8.3 - 10.6 mg/dL    Total Protein 8.2 6.4 - 8.2 g/dL    Albumin 4.1 3.4 - 5.0 g/dL    Albumin/Globulin Ratio 1.0 (L) 1.1 - 2.2    Total Bilirubin 0.6 0.0 - 1.0 mg/dL    Alkaline Phosphatase 95 40 - 129 U/L    ALT 13 10 - 40 U/L    AST 15 15 - 37 U/L    Globulin 4.1 g/dL   Lactic Acid, Plasma   Result Value Ref Range    Lactic Acid 1.3 0.4 - 2.0 mmol/L       Treatment in the department:  Patient received   Medications   0.9 % sodium chloride bolus (0 mLs IntraVENous Stopped 21 1203)   vancomycin 1000 mg IVPB in 250 mL D5W addavial (0 mg IntraVENous Stopped 21 1207)         Medical decision makin  Discussed case with DR Scott Clay, orthopedics. Pictures of wound sent. Patient with need Operative management of his finger infection. Plan to transfer to [facility] as this is where Dr Jihan Chau is operating today. Patient to go directly to [facility] outpatient surgery center at [facility] to expedite surgical treatment. From there plan to admit to hospitalist for IV antibiotics.  Plan discussed with patient he prefers to go via private car rather than ambulance transfer. Discussed need to go straight there and nothing to eat or drink. He will  a family member on way to drive with him. I spoke with Dr. Danell Dance, hospitalist. We thoroughly discussed the history, physical exam, laboratory and imaging studies, as well as, current course of treatment within the emergency department. Based upon that discussion, we've decided to transfer Fany Lozano to [place], for further observation and evaluation of Fany Lozano current condition. As I have deemed necessary from their history, physical, and studies, I have considered and evaluated Fany Lozano for the following diagnoses:  Tenosynovitis, abscess, felon, cellulitis, osteomyelitis, dka      Clinical Impression:  1. Abscess of left middle finger    2. Cellulitis of left middle finger    3. Type 2 diabetes mellitus with hyperglycemia, without long-term current use of insulin (HCC)        Dispo:  Patient will be transferred at this time. Patient was informed of this decision and agrees with plan. I have discussed lab and xray findings with patient and they understand. Questions were answered to the best of my ability. Discharge vitals:  Blood pressure 137/87, pulse 94, temperature 98.3 °F (36.8 °C), temperature source Oral, resp. rate 17, height 5' 9\" (1.753 m), weight 238 lb 8.6 oz (108.2 kg), SpO2 99 %. Prescriptions given:   New Prescriptions    No medications on file         This chart was created using dragon voice recognition software.         Laurie Olguin MD  09/27/21 6444

## 2021-09-27 NOTE — LETTER
[facility] Acute Rehab Unit  [address]  Phone: [phone]      September 28, 2021     Patient: Kristen Soliman   YOB: 1970   Date of Visit: 9/27/2021       To Whom It May Concern: It is my medical opinion that Kristen Soliman shall remain out of work until his follow-up appt with Dr. Maverick Angulo in approx 10 days. If you have any questions or concerns, please don't hesitate to call.     Sincerely,          Stevie Camp, CNP

## 2021-09-27 NOTE — PROGRESS NOTES
4 Eyes Skin Assessment     NAME:  Kristen Soliman  YOB: 1970  MEDICAL RECORD NUMBER:  [id number]    The patient is being assess for  Admission    I agree that 2 RN's have performed a thorough Head to Toe Skin Assessment on the patient. ALL assessment sites listed below have been assessed. Areas assessed by both nurses:    Head, Face, Ears, Shoulders, Back, Chest, Arms, Elbows, Hands, Sacrum. Buttock, Coccyx, Ischium and Legs. Feet and Heels        Does the Patient have a Wound?  Other surgical wound on left 3rd finger       Saúl Prevention initiated:  No   Wound Care Orders initiated:  NA    Pressure Injury (Stage 3,4, Unstageable, DTI, NWPT, and Complex wounds) if present place consult order under [de-identified] NA    New and Established Ostomies if present place consult order under : No      Nurse 1 eSignature: Electronically signed by Michael Reaves RN on 9/27/21 at 6:15 PM EDT    **SHARE this note so that the co-signing nurse is able to place an eSignature**    Nurse 2 eSignature: Electronically signed by Fay Gonzales RN on 9/28/21 at 1:53 AM EDT

## 2021-09-27 NOTE — ED NOTES
[id number] called and spoke with Je Akers to request ortho hand MD for consult     Chrissy Moreno, RN  09/27/21 3211

## 2021-09-27 NOTE — PROGRESS NOTES
The pt arrived to the room. Alert and oriented. No pain at this time. Reviewed all new orders. The pt verbalized understanding.

## 2021-09-28 LAB
C-REACTIVE PROTEIN: 11.9 MG/L (ref 0–5.1)
ESTIMATED AVERAGE GLUCOSE: 280.5 MG/DL
GLUCOSE BLD-MCNC: 175 MG/DL (ref 70–99)
GLUCOSE BLD-MCNC: 186 MG/DL (ref 70–99)
GLUCOSE BLD-MCNC: 189 MG/DL (ref 70–99)
GLUCOSE BLD-MCNC: 195 MG/DL (ref 70–99)
HBA1C MFR BLD: 11.4 %
HCT VFR BLD CALC: 37.5 % (ref 40.5–52.5)
HEMOGLOBIN: 13.1 G/DL (ref 13.5–17.5)
MCH RBC QN AUTO: 31.8 PG (ref 26–34)
MCHC RBC AUTO-ENTMCNC: 34.8 G/DL (ref 31–36)
MCV RBC AUTO: 91.2 FL (ref 80–100)
PDW BLD-RTO: 12.7 % (ref 12.4–15.4)
PERFORMED ON: ABNORMAL
PLATELET # BLD: 331 K/UL (ref 135–450)
PMV BLD AUTO: 6.4 FL (ref 5–10.5)
RBC # BLD: 4.11 M/UL (ref 4.2–5.9)
WBC # BLD: 10.3 K/UL (ref 4–11)

## 2021-09-28 PROCEDURE — 6360000002 HC RX W HCPCS: Performed by: NURSE PRACTITIONER

## 2021-09-28 PROCEDURE — 6360000002 HC RX W HCPCS: Performed by: INTERNAL MEDICINE

## 2021-09-28 PROCEDURE — 6370000000 HC RX 637 (ALT 250 FOR IP): Performed by: INTERNAL MEDICINE

## 2021-09-28 PROCEDURE — 86140 C-REACTIVE PROTEIN: CPT

## 2021-09-28 PROCEDURE — 2580000003 HC RX 258: Performed by: INTERNAL MEDICINE

## 2021-09-28 PROCEDURE — 36415 COLL VENOUS BLD VENIPUNCTURE: CPT

## 2021-09-28 PROCEDURE — 1200000000 HC SEMI PRIVATE

## 2021-09-28 PROCEDURE — 2580000003 HC RX 258: Performed by: NURSE PRACTITIONER

## 2021-09-28 PROCEDURE — 6370000000 HC RX 637 (ALT 250 FOR IP): Performed by: NURSE PRACTITIONER

## 2021-09-28 PROCEDURE — APPNB45 APP NON BILLABLE 31-45 MINUTES: Performed by: NURSE PRACTITIONER

## 2021-09-28 PROCEDURE — 83036 HEMOGLOBIN GLYCOSYLATED A1C: CPT

## 2021-09-28 PROCEDURE — 99024 POSTOP FOLLOW-UP VISIT: CPT | Performed by: NURSE PRACTITIONER

## 2021-09-28 PROCEDURE — 87641 MR-STAPH DNA AMP PROBE: CPT

## 2021-09-28 PROCEDURE — 85027 COMPLETE CBC AUTOMATED: CPT

## 2021-09-28 PROCEDURE — 99255 IP/OBS CONSLTJ NEW/EST HI 80: CPT | Performed by: INTERNAL MEDICINE

## 2021-09-28 RX ORDER — GLUCOSAMINE HCL/CHONDROITIN SU 500-400 MG
CAPSULE ORAL
Qty: 100 STRIP | Refills: 3 | Status: SHIPPED | OUTPATIENT
Start: 2021-09-28

## 2021-09-28 RX ORDER — LINEZOLID 600 MG/1
600 TABLET, FILM COATED ORAL EVERY 12 HOURS SCHEDULED
Status: DISCONTINUED | OUTPATIENT
Start: 2021-09-28 | End: 2021-09-29

## 2021-09-28 RX ORDER — LANCETS 30 GAUGE
1 EACH MISCELLANEOUS 3 TIMES DAILY
Qty: 200 EACH | Refills: 3 | Status: SHIPPED | OUTPATIENT
Start: 2021-09-28 | End: 2022-08-05

## 2021-09-28 RX ORDER — HYDROCODONE BITARTRATE AND ACETAMINOPHEN 5; 325 MG/1; MG/1
1 TABLET ORAL EVERY 6 HOURS PRN
Qty: 12 TABLET | Refills: 0 | Status: SHIPPED | OUTPATIENT
Start: 2021-09-28 | End: 2021-09-29 | Stop reason: SDUPTHER

## 2021-09-28 RX ORDER — LISINOPRIL 5 MG/1
5 TABLET ORAL DAILY
Status: DISCONTINUED | OUTPATIENT
Start: 2021-09-28 | End: 2021-09-29

## 2021-09-28 RX ADMIN — LISINOPRIL 5 MG: 5 TABLET ORAL at 17:38

## 2021-09-28 RX ADMIN — INSULIN LISPRO 8 UNITS: 100 INJECTION, SOLUTION INTRAVENOUS; SUBCUTANEOUS at 09:23

## 2021-09-28 RX ADMIN — HYDROCODONE BITARTRATE AND ACETAMINOPHEN 1 TABLET: 10; 325 TABLET ORAL at 05:46

## 2021-09-28 RX ADMIN — INSULIN LISPRO 1 UNITS: 100 INJECTION, SOLUTION INTRAVENOUS; SUBCUTANEOUS at 21:46

## 2021-09-28 RX ADMIN — MEROPENEM 1000 MG: 1 INJECTION, POWDER, FOR SOLUTION INTRAVENOUS at 17:24

## 2021-09-28 RX ADMIN — INSULIN LISPRO 1 UNITS: 100 INJECTION, SOLUTION INTRAVENOUS; SUBCUTANEOUS at 18:59

## 2021-09-28 RX ADMIN — LINEZOLID 600 MG: 600 TABLET, FILM COATED ORAL at 21:42

## 2021-09-28 RX ADMIN — ACETAMINOPHEN 650 MG: 325 TABLET ORAL at 21:42

## 2021-09-28 RX ADMIN — ACETAMINOPHEN 650 MG: 325 TABLET ORAL at 13:33

## 2021-09-28 RX ADMIN — ACETAMINOPHEN 650 MG: 325 TABLET ORAL at 09:22

## 2021-09-28 RX ADMIN — CEFAZOLIN 2000 MG: 10 INJECTION, POWDER, FOR SOLUTION INTRAVENOUS at 05:46

## 2021-09-28 RX ADMIN — CEFAZOLIN 2000 MG: 10 INJECTION, POWDER, FOR SOLUTION INTRAVENOUS at 14:55

## 2021-09-28 RX ADMIN — INSULIN LISPRO 8 UNITS: 100 INJECTION, SOLUTION INTRAVENOUS; SUBCUTANEOUS at 18:58

## 2021-09-28 RX ADMIN — INSULIN GLARGINE 16 UNITS: 100 INJECTION, SOLUTION SUBCUTANEOUS at 21:45

## 2021-09-28 RX ADMIN — Medication 10 ML: at 09:23

## 2021-09-28 RX ADMIN — INSULIN LISPRO 1 UNITS: 100 INJECTION, SOLUTION INTRAVENOUS; SUBCUTANEOUS at 09:22

## 2021-09-28 RX ADMIN — HYDROCODONE BITARTRATE AND ACETAMINOPHEN 1 TABLET: 10; 325 TABLET ORAL at 21:42

## 2021-09-28 RX ADMIN — Medication 10 ML: at 21:53

## 2021-09-28 RX ADMIN — INSULIN LISPRO 8 UNITS: 100 INJECTION, SOLUTION INTRAVENOUS; SUBCUTANEOUS at 13:30

## 2021-09-28 RX ADMIN — INSULIN LISPRO 1 UNITS: 100 INJECTION, SOLUTION INTRAVENOUS; SUBCUTANEOUS at 13:30

## 2021-09-28 ASSESSMENT — PAIN SCALES - GENERAL
PAINLEVEL_OUTOF10: 0
PAINLEVEL_OUTOF10: 0
PAINLEVEL_OUTOF10: 6
PAINLEVEL_OUTOF10: 0
PAINLEVEL_OUTOF10: 5
PAINLEVEL_OUTOF10: 6
PAINLEVEL_OUTOF10: 5
PAINLEVEL_OUTOF10: 6
PAINLEVEL_OUTOF10: 0
PAINLEVEL_OUTOF10: 0

## 2021-09-28 ASSESSMENT — ENCOUNTER SYMPTOMS
COUGH: 0
SORE THROAT: 0
ABDOMINAL PAIN: 0
EYE REDNESS: 0
DIARRHEA: 0
WHEEZING: 0
TROUBLE SWALLOWING: 0
EYE DISCHARGE: 0
BACK PAIN: 0
CONSTIPATION: 0
RHINORRHEA: 0
NAUSEA: 0
SHORTNESS OF BREATH: 0

## 2021-09-28 ASSESSMENT — PAIN DESCRIPTION - LOCATION: LOCATION: HAND

## 2021-09-28 ASSESSMENT — PAIN DESCRIPTION - PAIN TYPE
TYPE: SURGICAL PAIN
TYPE: SURGICAL PAIN

## 2021-09-28 ASSESSMENT — PAIN DESCRIPTION - DESCRIPTORS: DESCRIPTORS: SORE

## 2021-09-28 ASSESSMENT — PAIN DESCRIPTION - ORIENTATION: ORIENTATION: LEFT

## 2021-09-28 NOTE — CARE COORDINATION
The patient is an acute care inpatient being housed on the Acute Rehab Unit because of capacity issues related to the COVID-19 pandemic.

## 2021-09-28 NOTE — PLAN OF CARE
Practiced pricking finger & gave self Latus. HGBA1C in the morning. Discussed d/c, plan for f/u for finger & f/u w/ a new family doctor    Problem: Falls - Risk of:  Goal: Will remain free from falls  Description: Will remain free from falls  Outcome: Ongoing  Goal: Absence of physical injury  Description: Absence of physical injury  Outcome: Ongoing     Problem: Pain:  Description: Pain management should include both nonpharmacologic and pharmacologic interventions.   Goal: Pain level will decrease  Description: Pain level will decrease  Outcome: Ongoing  Goal: Control of acute pain  Description: Control of acute pain  Outcome: Ongoing  Goal: Control of chronic pain  Description: Control of chronic pain  Outcome: Ongoing

## 2021-09-28 NOTE — OP NOTE
[address]                     [facility], [address]                                OPERATIVE REPORT    PATIENT NAME: Louie Clayton                        :        1970  MED REC NO:   [id number]                          ROOM:       [id number]  ACCOUNT NO:   [de-identified]                           ADMIT DATE: 2021  PROVIDER:     Nabila Monroe MD    DATE OF PROCEDURE:  2021    PREOPERATIVE DIAGNOSES:  Left hand long finger deep infection with  suppurative flexor tenosynovitis and abscess. POSTOPERATIVE DIAGNOSES:  Left hand long finger deep infection with  suppurative flexor tenosynovitis and abscess. OPERATION PERFORMED:  Incision of tendon sheath, left long finger flexor  tenosynovitis with excisional debridement of skin, subcutaneous tissue  and fascia. SURGEON:  Nabila Monroe MD    ASSISTANTBeronica Fregoso, surgical assistant. ANESTHESIA:  General anesthesia. ESTIMATED BLOOD LOSS:  Minimal.    COMPLICATIONS:  None. SPECIMENS:  Swab for culture and sensitivity. TOURNIQUET:  Left upper arm 250 mmHg. INDICATION:  This is a 68-year-old white male, right-hand dominant, who  has been complaining of left hand long finger pain and swelling for over  a week. He does not remember a specific history of trauma. He was seen  at Urgent Care on Monday, given Bactrim and Keflex, but it has not  improved, and [facility] and we were  consulted for his condition. All risks, benefits, and alternatives were  discussed with the patient. He agreed to proceed with surgical  treatment. Given the patient's Body mass index is 34.11 kg/m². added significant challenge to the procedure. It required significant physical and mental effort. It required 60% more time for such procedure. DESCRIPTION OF PROCEDURE:  The patient's left hand was marked. He  received 2 gm Ancef IV intraoperatively after the culture was taken.

## 2021-09-28 NOTE — CONSULTS
Infectious Diseases   Consult Note        Admission Date: 9/27/2021  Hospital Day: Hospital Day: 2   Attending: Primus Goltz, MD  Date of service: 9/28/21     Reason for admission: M65.9  LEFT HAND ABSCESS/TENOSYNVITIS    Chief complaint/ Reason for consult: Complicated left hand long finger deep infection with suppurative flexor tenosynovitis    Microbiology:        I have reviewed allavailable micro lab data and cultures    · Left hand long finger surgical culture  - collected on 9/27/2021: Enterobacter cloacae complex  · Blood cultures: Collected on 9/27/2021: Negative so far      Antibiotics and immunizations:       Current antibiotics: All antibiotics and their doses were reviewed by me    Recent Abx Admin                   ceFAZolin (ANCEF) 2000 mg in dextrose 5 % 50 mL IVPB (mg) 2,000 mg New Bag 09/28/21 1455    ceFAZolin (ANCEF) 2000 mg in dextrose 5 % 50 mL IVPB (mg) 2,000 mg New Bag 09/28/21 0546     2,000 mg New Bag 09/27/21 2202                  Immunization History: All immunization history was reviewed by me today. There is no immunization history on file for this patient. Known drug allergies: All allergies were reviewed and updated    No Known Allergies    Social history:     Social History:  All social andepidemiologic history was reviewed and updated by me today as needed. · Tobacco use:   reports that he has quit smoking. His smokeless tobacco use includes snuff. · Alcohol use:   reports current alcohol use of about 4.0 standard drinks of alcohol per week. · Currently lives in: [place] [address]  ·  reports no history of drug use. COVID VACCINATION AND LAB RESULT RECORDS:     Internal Administration   First Dose      Second Dose           Last COVID Lab SARS-CoV-2, NAAT (no units)   Date Value   09/27/2021 Not Detected            Assessment:     The patient is a 46 y.o. old male who  has a past medical history of DM (diabetes mellitus) ([facility] [address] (09/27/2021).  with following problems:    · Complicated left hand long finger suppurative flexor tenosynovitis and deep space abscess  · S/p surgical I&D of the left long finger 9/27/2021, surgical culture growing Enterobacter cloacae  · Poorly controlled type 2 diabetes mellitus, HbA1c is 11.4  · Elevated CRP of 11.9  · Failure of outpatient antibiotics  · Obesity Class 1 due to excess calorie intake : Body mass index is 34.11 kg/m². ·       Discussion:      The patient is left-hand predominant and has been having worsening infection of the left long finger for around 10 days. The patient has undergone surgical I&D of the left long finger yesterday. Surgical culture and wound culture growing Enterobacter cloacae from yesterday. Is currently on perioperative cefazolin    Plan:     Diagnostic Workup:    · Will order sed rate to be added onto morning labs  · Will order nasal MRSA probe  · Continue to follow fever curve, WBC count and blood cultures  · Follow up on liverand renal functions closely    Antimicrobials:    · Will stop IV cefazolin today  · Order IV meropenem 1 g every 8 hour for Enterobacter cloacae coverage, until susceptibilities available  · Since cultures are too young, will also cover empirically for MRSA at this time. We will start empiric p.o. linezolid 600 mg every 12 hours. Platelet count is still 31,000  · Start oral probiotic twice daily  · Surgical site care  · Pain control  · We will follow up on the culture results and clinical progress and will make further recommendations accordingly. · Continue close vitals monitoring. · Maintain good glycemic control. · Fall precautions. Aspiration precautions. · Continue to watch for new fever or diarrhea. · DVT prophylaxis. · Discussed all above with patient and RN.   · Discussed with Dr. Thais Santoyo      Drug Monitoring:    · Continue serial monitoring for antibiotic toxicity as follows: *CBC, CMP  · Continue to watch for following: new or worsening fever, hypotension, hives, lip swelling and redness or purulence at vascular access sites. I/v access Management:    · Continue to monitor i.v access sites for erythema, induration, discharge or tenderness. · As always, continue efforts to minimizetubes/lines/drains as clinically appropriate to reduce chances of line associated infections. Current isolation precautions: There are no current isolations documented for this patient. Level of complexity of consult: High     Risk of Complications/Morbidity: High     · Illness(es)/ Infection present that pose threat to life/bodily function. · There is potential for severe exacerbation of infection/side effects of treatment. · Therapy requires intensive monitoring for antimicrobial agent toxicity. Thank you for involving me in the care of your patient. I will continue to follow. If you have any additional questions, please do not hesitate to contact me. Subjective:     Presenting complaint in ER:     No chief complaint on file. HPI: Alicja Monsalve is a 46 y.o. male patient, who was seen at the request of Dr. Primus Goltz, MD.    History was obtained from chart review and the patient. The patient was admitted on 9/27/2021. I have been consulted to see the patient for above mentioned reason(s). The patient has multiple medical comorbidities, and presented to the ER for left hand middle finger pain and redness and swelling and drainage. His symptoms started about 9 days ago. The patient thought that he probably got a splinter in the left long finger. The patient went to an urgent care on Monday and was started on oral Bactrim and Keflex but continued to have increasing redness and swelling of the left long finger and was having difficulty bending at that left distal interphalangeal joint. He came to the ER on 9/27/2021. The patient was seen by orthopedic surgery and had a surgical I&D done yesterday.   He has been started on perioperative Gastrointestinal: Negative for abdominal pain, constipation, diarrhea and nausea. Endocrine: Negative for polyuria. Genitourinary: Negative for dysuria, flank pain, frequency, hematuria and urgency. Musculoskeletal: Positive for arthralgias. Negative for back pain and myalgias. Postop pain in the left long finger   Skin: Negative for rash. Neurological: Negative for dizziness, seizures and headaches. Hematological: Does not bruise/bleed easily. Psychiatric/Behavioral: Negative for hallucinations and suicidal ideas. All other systems reviewed and are negative. Objective:       PHYSICAL EXAM:      Vitals:   Vitals:    09/28/21 0018 09/28/21 0549 09/28/21 0812 09/28/21 1329   BP: (!) 144/78 121/84 119/79 138/83   Pulse: 100 87 80 92   Resp: 16 18 16 18   Temp: 97.7 °F (36.5 °C) 97.5 °F (36.4 °C) 97.5 °F (36.4 °C) 98.4 °F (36.9 °C)   TempSrc: Oral Oral Oral Oral   SpO2: 95% 98% 95% 96%   Weight:       Height:           Physical Exam  Vitals and nursing note reviewed. Constitutional:       Appearance: Normal appearance. He is well-developed. HENT:      Head: Normocephalic and atraumatic. Right Ear: External ear normal.      Left Ear: External ear normal.      Nose: Nose normal. No congestion or rhinorrhea. Mouth/Throat:      Mouth: Mucous membranes are moist.      Pharynx: No oropharyngeal exudate or posterior oropharyngeal erythema. Eyes:      General: No scleral icterus. Right eye: No discharge. Left eye: No discharge. Conjunctiva/sclera: Conjunctivae normal.      Pupils: Pupils are equal, round, and reactive to light. Cardiovascular:      Rate and Rhythm: Normal rate and regular rhythm. Pulses: Normal pulses. Heart sounds: No murmur heard. No friction rub. Pulmonary:      Effort: Pulmonary effort is normal. No respiratory distress. Breath sounds: Normal breath sounds. No stridor. No wheezing, rhonchi or rales.    Abdominal: General: Bowel sounds are normal.      Palpations: Abdomen is soft. Tenderness: There is no abdominal tenderness. There is no right CVA tenderness, left CVA tenderness, guarding or rebound. Musculoskeletal:         General: Tenderness present. No swelling. Normal range of motion. Cervical back: Normal range of motion and neck supple. No rigidity. No muscular tenderness. Comments: Surgical dressing on the left long finger   Lymphadenopathy:      Cervical: No cervical adenopathy. Skin:     General: Skin is warm and dry. Coloration: Skin is not jaundiced. Findings: No erythema or rash. Neurological:      General: No focal deficit present. Mental Status: He is alert and oriented to person, place, and time. Mental status is at baseline. Motor: No abnormal muscle tone. Psychiatric:         Mood and Affect: Mood normal.         Behavior: Behavior normal.         Thought Content: Thought content normal.           Lines and drains: All vascular access sites are healthy with no local erythema, discharge or tenderness. Intake and output:     I/O last 3 completed shifts: In: 2634 [P.O.:1020; I.V.:295]  Out: 5 [Blood:5]    Lab Data:   All available labs were reviewed by me today.      CBC:   Recent Labs     09/27/21  1040 09/28/21  0650   WBC 8.2 10.3   RBC 5.26 4.11*   HGB 15.4 13.1*   HCT 47.7 37.5*    331   MCV 90.8 91.2   MCH 29.3 31.8   MCHC 32.3 34.8   RDW 12.1* 12.7        BMP:  Recent Labs     09/27/21  1040   *   K 3.9   CL 98*   CO2 23   BUN 16   CREATININE 0.6*   CALCIUM 9.9   GLUCOSE 281*        Hepatic FunctionPanel:   Lab Results   Component Value Date    ALKPHOS 95 09/27/2021    ALT 13 09/27/2021    AST 15 09/27/2021    PROT 8.2 09/27/2021    PROT 8.1 05/29/2012    BILITOT 0.6 09/27/2021    LABALBU 4.1 09/27/2021       CPK: No results found for: CKTOTAL  ESR: No results found for: SEDRATE  CRP:   Lab Results   Component Value Date    CRP 11.9 (H) 09/28/2021         Imaging: All pertinent images and reports for the current visit were reviewed by meduring this visit. No orders to display       Outside records:    Labs, Microbiology, Radiology and pertinent results from Care everywhere, if available, were reviewed as a part ofthe consultation. Problem list:       Patient Active Problem List   Diagnosis Code    Cellulitis of left hand L03.114    Abscess of hand, left L02.512    Suppurative tenosynovitis of flexor tendon of left hand M65.142    Failure of outpatient treatment Z78.9    Poorly controlled type 2 diabetes mellitus ([facility] [address] E11.65    Elevated C-reactive protein (CRP) R79.82    Infection due to Enterobacter cloacae A49.8    Class 1 obesity due to excess calories with body mass index (BMI) of 34.0 to 34.9 in adult E66.09, Z68.34         Please note that this chart was generated using Dragon dictation software. Although every effort was made to ensure the accuracy of this automated transcription, some errors in transcription may have occurred inadvertently. If you may need any clarification, please do not hesitate to contact me through EPIC or at the phone number provided below with my electronic signature. Any pictures or media included in this note were obtained after taking informed verbal consent from the patient and with their approval to include those in the patient's medical record.       Ivania Nelson MD, MPH  9/28/21, 3:48 PM EDT   [facility]   [address] [place] [address], [address] [place] [address]  Office: [phone]  Fax: [phone]  Clinic days:  Tuesday & Thursday

## 2021-09-28 NOTE — PROGRESS NOTES
[facility] Orthopedic Surgery   Progress Note    CHIEF COMPLAINT/DIAGNOSIS: S/p LEFT middle finger and flexor tendon sheath I&D distal phalanx (9/27/21)    SUBJECTIVE: The patient is sitting up in the bed; describes mild finger pain. 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 Nonsmoker. OBJECTIVE  Physical    VITALS:  /79   Pulse 80   Temp 97.5 °F (36.4 °C) (Oral)   Resp 16   Ht 5' 9\" (1.753 m)   Wt 231 lb (104.8 kg)   SpO2 95%   BMI 34.11 kg/m²     GENERAL: Alert and oriented x3, in no acute distress. MUSCULOSKELETAL: Able to flex and extend the left wrist and all digits other than the middle DIP without issue. Limited ROM middle digit DIP as expected given swelling/incision. INCISION:  Covered with post-op dressing; clean, dry and intact. Dressing removed and incision soaked x 15 minutes. No purulent material noted. Ecchymosis and flaking skin noted. Open incision is clean and not actively draining. Sensory:  Intact to light touch in axillary, radial, median ulnar distributions including middle digit fingertip. Vascular:   2+ radial pulses with brisk cap refill. Data    ALL MEDICATIONS HAVE BEEN REVIEWED    CBC: Recent Labs     09/27/21  1040 09/28/21  0650   WBC 8.2 10.3   HGB 15.4 13.1*   HCT 47.7 37.5*    331     BMP:   Recent Labs     09/27/21  1040   *   K 3.9   CL 98*   CO2 23   BUN 16   CREATININE 0.6*     INR: No results for input(s): INR in the last 72 hours. A1c pending    Surgical culture in process  Pre-op wound culture: 1+ gram (+) cocci  Blood culture x 2 in process    ASSESSMENT:  S/p LEFT middle finger I&D including flexor tendon sheath for suppurative tenosynovitis (9/27/21), POD#1  Acute blood loss anemia as expected  Hyperglycemia    PLAN:   Twice daily soapy water hand soaks followed by dressing change using adaptic, kerlix/ACE or coban.   Work note in chart.  - DVT prophylaxis: ambulation  - Acute blood loss anemia as expected: 13.1/37.5  - Pain Control: [place] sent to [employer]   - ID:  Ancef currently; rec 10 days oral abx per primary team at d/c  - Disposition: OK to d/c from our end once abx regimen finalized per primary team pending cultures. Follow-up with Dr. Saima Muhammad in 7-10 days. Office #[phone]  No future appointments.     TANISHA Branham - CNP  9/28/2021  9:04 AM

## 2021-09-28 NOTE — PROGRESS NOTES
CLINICAL PHARMACY NOTE: MEDS TO BEDS    Total # of Prescriptions Filled: 3   The following medications were delivered to the patient:  · Meter  · Lancets  · Test strips    Additional Documentation:    Delivered to Patient-signed  Angie Davenport CPhT

## 2021-09-28 NOTE — H&P
HOSPITALISTS HISTORY AND PHYSICAL    9/28/2021 3:30 PM    Patient Information:  Clint Latif is a 46 y.o. male [id number]  PCP:  No primary care provider on file. (Tel: None )    Chief complaint:  L middle finger Infection    History of Present Illness:  Britta Garcia is a 46 y.o. male with no known medical problems, presented with c/o painful left middle finger swelling, erythema and purulent draining for 9-10 days. Patient suspected a metal splinter which might have provoked an infection but he had presented to an urgent care 2 days after noticing the symptoms to an [facility] where he was prescribed Bactrim and Keflex. He had been on these for a week with no significant improvement with purulent and decreased function of L hand. He denied any fever, chills, N/V or systemic symptoms. He was seen by orthopedic on admission and sent to the OR for I&D and debridement of subcutaneus tissue and Fascia. Cultures so far positive for Enterobacter cloacae with sensitivities pending. He was started on Cefazolin and ID consulted as well. REVIEW OF SYSTEMS:   Constitutional: Negative for fever,chills or night sweats  ENT: Negative for rhinorrhea, epistaxis, hoarseness, sore throat. Respiratory: Negative for shortness of breath,wheezing  Cardiovascular: Negative for chest pain, palpitations   Gastrointestinal: Negative for nausea, vomiting, diarrhea  Genitourinary: Negative for polyuria, dysuria   Hematologic/Lymphatic: Negative for bleeding tendency, easy bruising  Musculoskeletal: Negative for myalgias and arthralgias  Neurologic: Negative for confusion,dysarthria. Skin: Negative for itching,rash  Psychiatric: Negative for depression,anxiety, agitation. Endocrine: Negative for polydipsia,polyuria,heat /cold intolerance. Past Medical History:   has a past medical history of DM (diabetes mellitus) ([facility] [address]      Past Surgical History:   has a past surgical history that includes hernia repair; Finger surgery (Left, 09/27/2021); and Hand surgery (Left, 9/27/2021). Medications:  No current facility-administered medications on file prior to encounter. No current outpatient medications on file prior to encounter. Allergies:  No Known Allergies     Social History:  Patient Lives with Family. reports that he has quit smoking. His smokeless tobacco use includes snuff. He reports current alcohol use of about 4.0 standard drinks of alcohol per week. He reports that he does not use drugs. Family History:  family history includes No Known Problems in his father and mother. Physical Exam:  /83   Pulse 92   Temp 98.4 °F (36.9 °C) (Oral)   Resp 18   Ht 5' 9\" (1.753 m)   Wt 231 lb (104.8 kg)   SpO2 96%   BMI 34.11 kg/m²     General appearance:  Appears comfortable. Well nourished  Eyes: Sclera clear, pupils equal  ENT: Moist mucus membranes, no thrush. Trachea midline. Cardiovascular: Regular rhythm, normal S1, S2. No murmur, gallop, rub. No edema in lower extremities  Respiratory: Clear to auscultation bilaterally, no wheeze, good inspiratory effort  Gastrointestinal: Abdomen soft, non-tender, not distended, normal bowel sounds  Musculoskeletal: No cyanosis in digits, neck supple. Left middle finger with dressing in place. C/D/I. Minimal swelling, no erythema noted. Neurology: Cranial nerves grossly intact. Alert and oriented in time, place and person. No speech or motor deficits  Psychiatry: Appropriate affect.  Not agitated  Skin: Warm, dry, normal turgor, no rash  Brisk capillary refill, peripheral pulses palpable   Labs:  CBC:   Lab Results   Component Value Date    WBC 10.3 09/28/2021    RBC 4.11 09/28/2021    HGB 13.1 09/28/2021    HCT 37.5 09/28/2021    MCV 91.2 09/28/2021    MCH 31.8 09/28/2021    MCHC 34.8 09/28/2021    RDW 12.7 09/28/2021     09/28/2021    MPV 6.4 09/28/2021     BMP:

## 2021-09-28 NOTE — PROGRESS NOTES
[facility] Diabetes Education Nurse  Consult Note       NAME:  Trisha Ball  MEDICAL RECORD NUMBER:  [id number]  AGE: 46 y.o. GENDER: male  : 1970  TODAY'S DATE:  2021    Subjective:     Reason for Educator consult ---  Evaluation and Assessment:    Trisha Ball is a 46 y.o. male referred by:   [x] Physician  [x] Nursing  [] Other:      Patient states never diagnosed with diabetes before this admission. The patient does not have a glucometer at home  and therefore will need one upon discharge. Pt was made aware of S/S of hyper- and hypoglycemia and the proper treatment of hyper- and hypoglycemia. Explained A1C values and goals. Patient states not counting carbohydrates. Gave recommendations on alternative beverages that pt could try instead of coke. Gave recommendations and handout information on diet with diabetes and counting carbohydrates  Encouraged diet compliance to improve pt's health and deter long term complications of DM. Explained will have dietary to come and see for further information on carbohydrate counting. Patient fixes own meals at home and does own grocery shopping. Consulted dietitian   Discussed exercise, sick day management, following dietary plan, following up with PCP. Discussed medications including insulin. Encouraged to attend outpatient diabetes classes. Discussed health benefits of non smoking. Recommend maintaining a smoke-free lifestyle. Patient given pamphlets of written material regarding diabetes titled \" Where do I Begin? \", \" What is Diabetes:\", \"Checking your Blood Sugar\", \"Know your Numbers\", and \"Building a Balanced Meal\".       PAST MEDICAL HISTORY      Diagnosis Date    DM (diabetes mellitus) ([facility] [address] 2021       PAST SURGICAL HISTORY  Past Surgical History:   Procedure Laterality Date    FINGER SURGERY Left 2021    I&D done on left 3rd finger    HAND SURGERY Left 2021    LEFT HAND INCISION AND DRAINAGE MIDDLE FINGER performed by Joshua Mauricio Marian Seay MD at [facility]  History reviewed. No pertinent family history. SOCIAL HISTORY  Social History     Tobacco Use    Smoking status: Former Smoker    Smokeless tobacco: Current User     Types: Snuff   Vaping Use    Vaping Use: Never used   Substance Use Topics    Alcohol use: Yes     Alcohol/week: 4.0 standard drinks     Types: 4 Cans of beer per week    Drug use: Never       ALLERGIES  No Known Allergies    MEDICATIONS  No current facility-administered medications on file prior to encounter. No current outpatient medications on file prior to encounter.        Objective:     LABS    CBC:   Lab Results   Component Value Date    WBC 10.3 09/28/2021    RBC 4.11 09/28/2021    HGB 13.1 09/28/2021    HCT 37.5 09/28/2021    MCV 91.2 09/28/2021    MCH 31.8 09/28/2021    MCHC 34.8 09/28/2021    RDW 12.7 09/28/2021     09/28/2021    MPV 6.4 09/28/2021     CMP:    Lab Results   Component Value Date     09/27/2021    K 3.9 09/27/2021    CL 98 09/27/2021    CO2 23 09/27/2021    BUN 16 09/27/2021    CREATININE 0.6 09/27/2021    GFRAA >60 09/27/2021    GFRAA >60 05/29/2012    AGRATIO 1.0 09/27/2021    LABGLOM >60 09/27/2021    GLUCOSE 281 09/27/2021    PROT 8.2 09/27/2021    PROT 8.1 05/29/2012    LABALBU 4.1 09/27/2021    CALCIUM 9.9 09/27/2021    BILITOT 0.6 09/27/2021    ALKPHOS 95 09/27/2021    AST 15 09/27/2021    ALT 13 09/27/2021       HgBA1c:  No results found for: LABA1C  Current A1C ordered and pending    This patient's last creatinine was   Recent Labs     09/27/21  1040   CREATININE 0.6*        Recent Blood sugars have been   Lab Results   Component Value Date    POCGLU 175 09/28/2021    POCGLU 195 09/28/2021    POCGLU 107 09/27/2021    POCGLU 265 09/27/2021    POCGLU 344 09/27/2021    POCGLU 278 09/27/2021     Lab Results   Component Value Date    GLUCOSE 281 09/27/2021    GLUCOSE 290 05/29/2012            Assessment:     Patient Active Problem List   Diagnosis    Cellulitis of left hand    Abscess of hand, left    Suppurative tenosynovitis of flexor tendon of left hand       Plan:     Plan of Care:   Diabetes Type 2 un-controlled  Lipids-- unknown & untreated   Renal- creatinine today 0.6, eGFR >60  ACE/ARB: no  DVT prophylaxis: on Lovenox  Current DM tx: basal & medium correction scale  Would recommend continuing with current insulin orders at this time. Current control unknown. A1C ordered and pending  Patient will require prescription assistance at discharge. Financial has been consulted and preliminary message in sticky notes stating will potentially qualify for 100%. Consults placed to dietitian for further education, , Ambulatory care clinic. Nursing to assist patient with new insulin start education with each accucheck and insulin administration. Nursing to assist patient with blood sugar checks. Continue to monitor blood sugars to determine adequacy of current dosages  Recommend maintaining a smoke-free lifestyle. Patient would like prescriptions on discharge to be filled here. Discharge Plan:  Patient to f/u with PCP. Instructed to log blood sugars and take blood sugar log to her f/u appointment. Jaida Vallejo RN, BSN, OMS, [address]

## 2021-09-28 NOTE — PLAN OF CARE
Problem: Falls - Risk of:  Goal: Will remain free from falls  Description: Will remain free from falls  9/28/2021 1446 by Hannah Berman RN  Outcome: Ongoing     Problem: Pain:  Goal: Pain level will decrease  Description: Pain level will decrease  9/28/2021 1446 by Hannah Berman RN  Outcome: Ongoing     Problem: Falls - Risk of:  Goal: Absence of physical injury  Description: Absence of physical injury  9/28/2021 1446 by Hannah Berman RN  Outcome: Ongoing     Problem: Pain:  Goal: Control of acute pain  Description: Control of acute pain  9/28/2021 1446 by Hannah Berman RN  Outcome: Ongoing     Problem: Pain:  Goal: Control of chronic pain  Description: Control of chronic pain  9/28/2021 1446 by Hannah Berman RN  Outcome: Ongoing

## 2021-09-28 NOTE — PROGRESS NOTES
Nutrition Education    · Verbally reviewed information with Patient  · Educated on [facility]. Reviewed portion control, label reading, and beverage choices  · Written educational materials provided. · Contact name and number provided. · Refer to Patient Education activity for more details.     Electronically signed by Stephanie Spaulding MS, RD, LD on 9/28/21 at 3:35 PM EDT    Contact: [phone]

## 2021-09-29 VITALS
HEART RATE: 89 BPM | DIASTOLIC BLOOD PRESSURE: 82 MMHG | OXYGEN SATURATION: 98 % | HEIGHT: 69 IN | WEIGHT: 231 LBS | TEMPERATURE: 97.7 F | BODY MASS INDEX: 34.21 KG/M2 | RESPIRATION RATE: 18 BRPM | SYSTOLIC BLOOD PRESSURE: 133 MMHG

## 2021-09-29 LAB
EKG ATRIAL RATE: 81 BPM
EKG DIAGNOSIS: NORMAL
EKG P AXIS: 59 DEGREES
EKG P-R INTERVAL: 154 MS
EKG Q-T INTERVAL: 418 MS
EKG QRS DURATION: 138 MS
EKG QTC CALCULATION (BAZETT): 485 MS
EKG R AXIS: -54 DEGREES
EKG T AXIS: -10 DEGREES
EKG VENTRICULAR RATE: 81 BPM
GLUCOSE BLD-MCNC: 128 MG/DL (ref 70–99)
GLUCOSE BLD-MCNC: 164 MG/DL (ref 70–99)
GRAM STAIN RESULT: ABNORMAL
MRSA SCREEN RT-PCR: NORMAL
ORGANISM: ABNORMAL
ORGANISM: ABNORMAL
PERFORMED ON: ABNORMAL
PERFORMED ON: ABNORMAL
WOUND/ABSCESS: ABNORMAL

## 2021-09-29 PROCEDURE — 6370000000 HC RX 637 (ALT 250 FOR IP): Performed by: INTERNAL MEDICINE

## 2021-09-29 PROCEDURE — 93010 ELECTROCARDIOGRAM REPORT: CPT | Performed by: INTERNAL MEDICINE

## 2021-09-29 PROCEDURE — 6370000000 HC RX 637 (ALT 250 FOR IP): Performed by: NURSE PRACTITIONER

## 2021-09-29 PROCEDURE — 2580000003 HC RX 258: Performed by: NURSE PRACTITIONER

## 2021-09-29 PROCEDURE — APPNB45 APP NON BILLABLE 31-45 MINUTES: Performed by: NURSE PRACTITIONER

## 2021-09-29 PROCEDURE — 99233 SBSQ HOSP IP/OBS HIGH 50: CPT | Performed by: INTERNAL MEDICINE

## 2021-09-29 PROCEDURE — 2580000003 HC RX 258: Performed by: INTERNAL MEDICINE

## 2021-09-29 PROCEDURE — 93005 ELECTROCARDIOGRAM TRACING: CPT | Performed by: INTERNAL MEDICINE

## 2021-09-29 PROCEDURE — 6360000002 HC RX W HCPCS: Performed by: INTERNAL MEDICINE

## 2021-09-29 PROCEDURE — 99024 POSTOP FOLLOW-UP VISIT: CPT | Performed by: NURSE PRACTITIONER

## 2021-09-29 RX ORDER — HYDROCODONE BITARTRATE AND ACETAMINOPHEN 5; 325 MG/1; MG/1
1 TABLET ORAL EVERY 6 HOURS PRN
Qty: 12 TABLET | Refills: 0 | Status: SHIPPED | OUTPATIENT
Start: 2021-09-29 | End: 2021-10-02

## 2021-09-29 RX ORDER — LINEZOLID 600 MG/1
600 TABLET, FILM COATED ORAL EVERY 12 HOURS SCHEDULED
Qty: 28 TABLET | Refills: 0 | Status: SHIPPED | OUTPATIENT
Start: 2021-09-29 | End: 2021-10-13

## 2021-09-29 RX ORDER — CIPROFLOXACIN 500 MG/1
500 TABLET, FILM COATED ORAL EVERY 12 HOURS SCHEDULED
Qty: 42 TABLET | Refills: 0 | Status: SHIPPED | OUTPATIENT
Start: 2021-09-29 | End: 2021-10-20

## 2021-09-29 RX ORDER — LEVOFLOXACIN 500 MG/1
750 TABLET, FILM COATED ORAL DAILY
Status: DISCONTINUED | OUTPATIENT
Start: 2021-09-29 | End: 2021-09-29

## 2021-09-29 RX ORDER — LISINOPRIL 10 MG/1
10 TABLET ORAL DAILY
Qty: 30 TABLET | Refills: 3 | Status: SHIPPED | OUTPATIENT
Start: 2021-09-30 | End: 2021-11-19

## 2021-09-29 RX ORDER — LINEZOLID 600 MG/1
600 TABLET, FILM COATED ORAL EVERY 12 HOURS SCHEDULED
Status: DISCONTINUED | OUTPATIENT
Start: 2021-09-29 | End: 2021-09-29 | Stop reason: HOSPADM

## 2021-09-29 RX ORDER — CIPROFLOXACIN 500 MG/1
500 TABLET, FILM COATED ORAL EVERY 12 HOURS SCHEDULED
Status: DISCONTINUED | OUTPATIENT
Start: 2021-09-29 | End: 2021-09-29 | Stop reason: HOSPADM

## 2021-09-29 RX ORDER — LISINOPRIL 10 MG/1
10 TABLET ORAL DAILY
Status: DISCONTINUED | OUTPATIENT
Start: 2021-09-30 | End: 2021-09-29 | Stop reason: HOSPADM

## 2021-09-29 RX ORDER — UBIQUINOL 100 MG
1 CAPSULE ORAL
Qty: 100 EACH | Refills: 3 | Status: SHIPPED | OUTPATIENT
Start: 2021-09-29 | End: 2022-06-20

## 2021-09-29 RX ORDER — GREEN TEA/HOODIA GORDONII 315-12.5MG
1 CAPSULE ORAL 2 TIMES DAILY
Qty: 42 TABLET | Refills: 0 | Status: SHIPPED | OUTPATIENT
Start: 2021-09-29 | End: 2021-10-20

## 2021-09-29 RX ORDER — INSULIN LISPRO 100 [IU]/ML
7 INJECTION, SOLUTION INTRAVENOUS; SUBCUTANEOUS
Qty: 6.3 ML | Refills: 1 | Status: SHIPPED | OUTPATIENT
Start: 2021-09-29 | End: 2022-03-07 | Stop reason: SDUPTHER

## 2021-09-29 RX ADMIN — INSULIN LISPRO 8 UNITS: 100 INJECTION, SOLUTION INTRAVENOUS; SUBCUTANEOUS at 12:53

## 2021-09-29 RX ADMIN — MEROPENEM 1000 MG: 1 INJECTION, POWDER, FOR SOLUTION INTRAVENOUS at 08:26

## 2021-09-29 RX ADMIN — Medication 10 ML: at 08:23

## 2021-09-29 RX ADMIN — LINEZOLID 600 MG: 600 TABLET, FILM COATED ORAL at 08:21

## 2021-09-29 RX ADMIN — INSULIN LISPRO 8 UNITS: 100 INJECTION, SOLUTION INTRAVENOUS; SUBCUTANEOUS at 08:27

## 2021-09-29 RX ADMIN — MEROPENEM 1000 MG: 1 INJECTION, POWDER, FOR SOLUTION INTRAVENOUS at 01:55

## 2021-09-29 RX ADMIN — LISINOPRIL 5 MG: 5 TABLET ORAL at 08:21

## 2021-09-29 RX ADMIN — ACETAMINOPHEN 650 MG: 325 TABLET ORAL at 13:57

## 2021-09-29 RX ADMIN — INSULIN LISPRO 1 UNITS: 100 INJECTION, SOLUTION INTRAVENOUS; SUBCUTANEOUS at 11:39

## 2021-09-29 RX ADMIN — ACETAMINOPHEN 650 MG: 325 TABLET ORAL at 08:21

## 2021-09-29 ASSESSMENT — PAIN DESCRIPTION - PROGRESSION: CLINICAL_PROGRESSION: NOT CHANGED

## 2021-09-29 ASSESSMENT — ENCOUNTER SYMPTOMS
NAUSEA: 0
COUGH: 0
EYE DISCHARGE: 0
WHEEZING: 0
SHORTNESS OF BREATH: 0
RHINORRHEA: 0
TROUBLE SWALLOWING: 0
ABDOMINAL PAIN: 0
DIARRHEA: 0
EYE REDNESS: 0
SORE THROAT: 0
BACK PAIN: 0
CONSTIPATION: 0

## 2021-09-29 ASSESSMENT — PAIN SCALES - GENERAL
PAINLEVEL_OUTOF10: 0

## 2021-09-29 NOTE — PROGRESS NOTES
Followed up with pt for DM education. Pt has been instructed on glucometer use, insulin pen use and storage, and has given his own injections. Discussed basal bolus insulin regimen with consideration of his work schedule. Pt asked appropriate questions. Pt states he's been educated on carb control diet. Pt avoids sugary drinks, states he likes zero calorie tea and diet soda. Discussed importance of blood sugar control for wound healing and infection prevention. Pt has follow up with new PCP on 10/7.     hCamp Torres MSN, RN, [facility]  Certified Diabetes Care and

## 2021-09-29 NOTE — DISCHARGE INSTR - COC
Continuity of Care Form    Patient Name: Baudilio File   :  1970  MRN:  [id number]    Admit date:  2021  Discharge date:  2021    Code Status Order: Full Code   Advance Directives:   [facility] Documentation     Date/Time Healthcare Directive Type of Healthcare Directive Copy in [address] Agent's Name Healthcare Agent's Phone Number    [phone]  No, patient does not have an advance directive for healthcare treatment  --  --  --  --  --          Admitting Physician:  Pema Hurst MD  PCP: No primary care provider on file. Discharging Nurse: Nestor SUNSHINE RN  Discharging [facility] [place] Unit/Room#: ZHD-8900/5669-26  Discharging Unit Phone Number: [phone]    Emergency Contact:   Extended Emergency Contact Information  Primary Emergency Contact: [address] [facility] Phone: [phone]  Relation: Aunt/Uncle    Past Surgical History:  Past Surgical History:   Procedure Laterality Date    FINGER SURGERY Left 2021    I&D done on left 3rd finger    HAND SURGERY Left 2021    LEFT HAND INCISION AND DRAINAGE MIDDLE FINGER performed by Joel Day MD at [place] [address]         Immunization History: There is no immunization history on file for this patient.     Active Problems:  Patient Active Problem List   Diagnosis Code    Cellulitis of left hand L03.114    Abscess of hand, left L02.512    Suppurative tenosynovitis of flexor tendon of left hand M65.142    Failure of outpatient treatment Z78.9    Poorly controlled type 2 diabetes mellitus ([facility] [address] E11.65    Elevated C-reactive protein (CRP) R79.82    Infection due to Enterobacter cloacae A49.8    Class 1 obesity due to excess calories with body mass index (BMI) of 34.0 to 34.9 in adult E66.09, Z68.34    Diabetes education, encounter for Z71.89    Weight loss counseling, encounter for Z71.3       Isolation/Infection:   Isolation          No Isolation        Patient Infection Status     None to display          Nurse Assessment:  Last Vital Signs: /82   Pulse 89   Temp 97.7 °F (36.5 °C) (Oral)   Resp 18   Ht 5' 9\" (1.753 m)   Wt 231 lb (104.8 kg)   SpO2 98%   BMI 34.11 kg/m²     Last documented pain score (0-10 scale): Pain Level: 0  Last Weight:   Wt Readings from Last 1 Encounters:   09/27/21 231 lb (104.8 kg)     Mental Status:  oriented and alert    IV Access:  - None    Nursing Mobility/ADLs:  Walking   Independent  Transfer  Independent  Bathing  Independent  Dressing  Independent  Toileting  Independent  Feeding  Independent  Med Admin  Independent  Med Delivery   whole    Wound Care Documentation and Therapy:        Elimination:  Continence:   · Bowel: Yes  · Bladder: Yes  Urinary Catheter: None   Colostomy/Ileostomy/Ileal Conduit: No       Date of Last BM: 9/29/2021    Intake/Output Summary (Last 24 hours) at 9/29/2021 1449  Last data filed at 9/29/2021 1240  Gross per 24 hour   Intake 620 ml   Output --   Net 620 ml     I/O last 3 completed shifts: In: 10 [I.V.:10]  Out: -     Safety Concerns:     None    Impairments/Disabilities:      None    Nutrition Therapy:  Current Nutrition Therapy:   - Oral Diet:  Carb Control 4 carbs/meal (1800kcals/day)    Routes of Feeding: Oral  Liquids: Thin Liquids  Daily Fluid Restriction: no  Last Modified Barium Swallow with Video (Video Swallowing Test): not done    Treatments at the Time of Hospital Discharge:   Respiratory Treatments: n/a  Oxygen Therapy:  is not on home oxygen therapy. Ventilator:    - No ventilator support    Patient's personal belongings (please select all that are sent with patient): All belongings gathered.     RN SIGNATURE:  Electronically signed by Jodee White RN on 9/29/21 at 2:52 PM EDT    CASE MANAGEMENT/SOCIAL WORK SECTION    Inpatient Status Date: ***    Readmission Risk Assessment Score:  Readmission Risk              Risk of Unplanned Readmission:  8           Discharging to Facility/ Agency   · Name:   · Address:  · Phone:  · Fax:    Dialysis Facility (if applicable)   · Name:  · Address:  · Dialysis Schedule:  · Phone:  · Fax:    / signature: {Esignature:[id number]}    PHYSICIAN SECTION    Prognosis: {Prognosis:6804004273}    Condition at Discharge: Gwen Armstrong Patient Condition:452730049}    Rehab Potential (if transferring to Rehab): {Prognosis:1148406675}    Recommended Labs or Other Treatments After Discharge: ***    Physician Certification: I certify the above information and transfer of Melinda Holliday  is necessary for the continuing treatment of the diagnosis listed and that he requires {Admit to Appropriate Level of Care:84947} for {GREATER/LESS:551168531} 30 days.      Update Admission H&P: [facility]}    PHYSICIAN SIGNATURE:  {Esignature:[id number]}

## 2021-09-29 NOTE — PLAN OF CARE
Pt given d/c instructions's with no questions or concerns. Pt taken off unit with IV removed and all questions answered. Pt tolerated IV removal well. Pt taken to main hospital lobby to  medications from outpt pharmacy. No questions or concerns. Pt aware to f/u with Ortho and New PCP appt.      Lilly QUEVEDON, RN   [phone]

## 2021-09-29 NOTE — PROGRESS NOTES
CLINICAL PHARMACY NOTE: MEDS TO BEDS    Total # of Prescriptions Filled: 3   The following medications were delivered to the patient:  · Zyvox 600 mg  · Acidophilus  · Cipro 500 mg    Additional Documentation:  Delivered to Patient=signed  Li Matias CPhT

## 2021-09-29 NOTE — PROGRESS NOTES
Infectious Diseases   Progress Note      Admission Date: 9/27/2021  Hospital Day: Hospital Day: 3   Attending: Simón Miguel MD  Date of service: 9/29/2021     Chief complaint/ Reason for consult:     · Complicated left hand long finger suppurative flexor tenosynovitis and deep space abscess  · S/p surgical I&D of the left long finger 9/27/2021, surgical culture growing Enterobacter cloacae  · Poorly controlled type 2 diabetes mellitus, HbA1c is 11.4    Microbiology:        I have reviewed allavailable micro lab data and cultures    · Left finger surgical culture  - collected on 9/27/2021: Group B streptococcus, group B streptococcus, Enterobacter cloacae    Susceptibility    Enterobacter cloacae complex (1)    Antibiotic Interpretation AMARI Status    amoxicillin-clavulanate Resistant >16/8 mcg/mL     ampicillin Resistant 16 mcg/mL     ceFAZolin Resistant >16 mcg/mL     cefepime Sensitive <=2 mcg/mL     cefTRIAXone Sensitive <=1 mcg/mL     cefuroxime Sensitive <=4 mcg/mL     ciprofloxacin Sensitive <=1 mcg/mL     ertapenem Sensitive <=0.5 mcg/mL     gentamicin Sensitive <=4 mcg/mL     meropenem Sensitive <=1 mcg/mL     piperacillin-tazobactam Sensitive <=16 mcg/mL     trimethoprim-sulfamethoxazole Sensitive <=2/38 mcg/mL         Antibiotics and immunizations:       Current antibiotics: All antibiotics and their doses were reviewed by me    Recent Abx Admin                   meropenem (MERREM) 1,000 mg in sodium chloride 0.9 % 100 mL IVPB (mini-bag) (mg) 1,000 mg New Bag 09/29/21 0826     1,000 mg New Bag  0155     1,000 mg New Bag 09/28/21 1724    linezolid (ZYVOX) tablet 600 mg (mg) 600 mg Given 09/29/21 0821     600 mg Given 09/28/21 2142    ceFAZolin (ANCEF) 2000 mg in dextrose 5 % 50 mL IVPB (mg) 2,000 mg New Bag 09/28/21 1455                  Immunization History: All immunization history was reviewed by me today. There is no immunization history on file for this patient.     Known drug allergies: All allergies were reviewed and updated    No Known Allergies    Social history:     Social History:  All social andepidemiologic history was reviewed and updated by me today as needed. · Tobacco use:   reports that he has quit smoking. His smokeless tobacco use includes snuff. · Alcohol use:   reports current alcohol use of about 4.0 standard drinks of alcohol per week. · Currently lives in: [place] [address]  ·  reports no history of drug use. COVID VACCINATION AND LAB RESULT RECORDS:     Internal Administration   First Dose      Second Dose           Last COVID Lab SARS-CoV-2, NAAT (no units)   Date Value   09/27/2021 Not Detected            Assessment:     The patient is a 46 y.o. old male who  has a past medical history of DM (diabetes mellitus) ([facility] [address] (09/27/2021). with following problems:    · Complicated left hand long finger suppurative flexor tenosynovitis and deep space abscess-currently linezolid and meropenem  · S/p surgical I&D of the left long finger 9/27/2021, surgical culture growing Enterobacter cloacae, group B and group B streptococcus-on appropriate antibiotics  · Poorly controlled type 2 diabetes mellitus, HbA1c is 11.4  · Elevated CRP of 11.9  · Failure of outpatient antibiotics  · Obesity Class 1 due to excess calorie intake : Body mass index is 34.11 kg/m². Discussion:      The patient is on linezolid and IV meropenem. I have started him on these antibiotics yesterday. Left finger surgical culture is growing group B streptococcus and group B streptococcus and Enterobacter. Enterobacter susceptibility now available and reviewed. Plan:     Diagnostic Workup:    · Will order twelve-lead EKG to assess QTc interval  · Continue to follow  fever curve, WBC count and blood cultures. · Continue to monitor blood counts, liver and renal function.     Antimicrobials:    · Will continue p.o. linezolid 600 mg every 12 hour for gram-positive coverage  · We will stop IV diabetes medications. Patient was advised to the trim the toe nails carefully, wear shoes or slippers at all times and check both feet everyday before going to bed to look for any cuts, blisters, swelling or redness. Importance of washing the feet everyday with soap and water and keeping them dry, and seeking prompt medical attention in case of a non-healing wound or ulcer on the feet was also highlighted. Weight loss counseling:    Extensive weight loss counseling was done. It is important to set a realistic weight loss goal. First goal should be to avoid gaining more weight and staying at current weight (or within 5 percent). People at high risk of developing diabetes who are able to lose 5 percent of their body weight and maintain this weight will reduce their risk of developing diabetes by about 50 percent and reduce their blood pressure. Losing more than 15 percent of  body weight and staying at this weight is an extremely good result, even if you never reach your \"dream\" or \"ideal\" weight. Lifestyle changes including changing eating habits, substituting excess carbohydrates with proteins, stress reduction, using self-help programs like Weight Watchers®, Overeaters Anonymous®, and Take Off Pounds Sensibly (TOPS)© , following DASH diet and increasing exercise or walking briskly daily for half hour to and hour 5-7 days a week was suggested among other measures. Information was given about various weight loss education programs and their websites like www.cdc.gov/healthyweight, www.choosemyplate.gov and www.health.gov/dietaryguidelines/    Fluoroquinolone related instructions:     Patient instructed to watch for low or high blood sugars, muscle pains and ankle tendon pain while on Ciprofloxacin or Levofloxacin. Patient was advised to keep a sugar candy at all times as all fluoroquinolones have the potential of causing hypoglycemia.  If these symptoms develops, patient was instructed to stop the antibiotic and call my office at [phone]. Use sunscreen when going in bright sun while on Ciprofloxacin or Levofloxacin as these antibiotics can cause photosensitivity. Take 1 hour before or 2 hour after dairy, calcium, iron, magnesium, aluminum or zinc.      Level of complexity of visit: High     TIME SPENT TODAY:     - Spent over  36 minutes on visit (including interval history, physical exam, review of data including labs, cultures, imaging, development and implementation of treatment plan and coordination of complex care). More than 50 percent of this includes face-to-face time spent with the patient for counseling and coordination of care. Thank you for involving me in the care of your patient. I will continue to follow. If you have anyadditional questions, please do not hesitate to contact me. Subjective: Interval history: Interval history was obtained from chart review and patient/ RN. The patient is afebrile. He is tolerating antibiotics okay. No diarrhea     REVIEW OF SYSTEMS:      Review of Systems   Constitutional: Negative for chills, diaphoresis and fever. HENT: Negative for ear discharge, ear pain, rhinorrhea, sore throat and trouble swallowing. Eyes: Negative for discharge and redness. Respiratory: Negative for cough, shortness of breath and wheezing. Cardiovascular: Negative for chest pain and leg swelling. Gastrointestinal: Negative for abdominal pain, constipation, diarrhea and nausea. Endocrine: Negative for polyuria. Genitourinary: Negative for dysuria, flank pain, frequency, hematuria and urgency. Musculoskeletal: Negative for back pain and myalgias. Skin: Negative for rash. Neurological: Negative for dizziness, seizures and headaches. Hematological: Does not bruise/bleed easily. Psychiatric/Behavioral: Negative for hallucinations and suicidal ideas. All other systems reviewed and are negative.         Past Medical History: All past medical history reviewed today. Past Medical History:   Diagnosis Date    DM (diabetes mellitus) ([facility] [address] 09/27/2021       Past Surgical History: All past surgical history was reviewed today. Past Surgical History:   Procedure Laterality Date    FINGER SURGERY Left 09/27/2021    I&D done on left 3rd finger    HAND SURGERY Left 9/27/2021    LEFT HAND INCISION AND DRAINAGE MIDDLE FINGER performed by Deon Croft MD at [place] [address]         Family History: All family history was reviewed today. Problem Relation Age of Onset    No Known Problems Mother     No Known Problems Father        Objective:       PHYSICAL EXAM:      Vitals:   Vitals:    09/28/21 1730 09/28/21 2115 09/29/21 0813 09/29/21 1230   BP: 137/83 (!) 143/93 (!) 146/82 133/82   Pulse: 78 81 79 89   Resp: 18 18 18 18   Temp: 98.3 °F (36.8 °C) 98.3 °F (36.8 °C) 97.8 °F (36.6 °C) 97.7 °F (36.5 °C)   TempSrc:  Oral Oral Oral   SpO2: 97% 95% 96% 98%   Weight:       Height:           Physical Exam  Vitals and nursing note reviewed. Constitutional:       Appearance: Normal appearance. He is well-developed. HENT:      Head: Normocephalic and atraumatic. Right Ear: External ear normal.      Left Ear: External ear normal.      Nose: Nose normal. No congestion or rhinorrhea. Mouth/Throat:      Mouth: Mucous membranes are moist.      Pharynx: No oropharyngeal exudate or posterior oropharyngeal erythema. Eyes:      General: No scleral icterus. Right eye: No discharge. Left eye: No discharge. Conjunctiva/sclera: Conjunctivae normal.      Pupils: Pupils are equal, round, and reactive to light. Cardiovascular:      Rate and Rhythm: Normal rate and regular rhythm. Pulses: Normal pulses. Heart sounds: No murmur heard. No friction rub. Pulmonary:      Effort: Pulmonary effort is normal. No respiratory distress. Breath sounds: Normal breath sounds. No stridor. No wheezing, rhonchi or rales. Abdominal:      General: Bowel sounds are normal.      Palpations: Abdomen is soft. Tenderness: There is no abdominal tenderness. There is no right CVA tenderness, left CVA tenderness, guarding or rebound. Musculoskeletal:         General: No swelling or tenderness. Normal range of motion. Cervical back: Normal range of motion and neck supple. No rigidity. No muscular tenderness. Lymphadenopathy:      Cervical: No cervical adenopathy. Skin:     General: Skin is warm and dry. Coloration: Skin is not jaundiced. Findings: No erythema or rash. Comments: Surgical dressing on left long finger   Neurological:      General: No focal deficit present. Mental Status: He is alert and oriented to person, place, and time. Mental status is at baseline. Motor: No abnormal muscle tone. Psychiatric:         Mood and Affect: Mood normal.         Behavior: Behavior normal.         Thought Content: Thought content normal.            Lines and drains: All vascular access sites are healthy with no local erythema, discharge or tenderness. Intake and output:    I/O last 3 completed shifts: In: 10 [I.V.:10]  Out: -     Lab Data:   All available labs and old records have been reviewed by me.     CBC:  Recent Labs     09/27/21  1040 09/28/21  0650   WBC 8.2 10.3   RBC 5.26 4.11*   HGB 15.4 13.1*   HCT 47.7 37.5*    331   MCV 90.8 91.2   MCH 29.3 31.8   MCHC 32.3 34.8   RDW 12.1* 12.7        BMP:  Recent Labs     09/27/21  1040   *   K 3.9   CL 98*   CO2 23   BUN 16   CREATININE 0.6*   CALCIUM 9.9   GLUCOSE 281*        Hepatic Function Panel:   Lab Results   Component Value Date    ALKPHOS 95 09/27/2021    ALT 13 09/27/2021    AST 15 09/27/2021    PROT 8.2 09/27/2021    PROT 8.1 05/29/2012    BILITOT 0.6 09/27/2021    LABALBU 4.1 09/27/2021       CPK: No results found for: CKTOTAL  ESR: No results found for: SEDRATE  CRP:   Lab Results   Component Value Date    CRP 11.9 (H) 09/28/2021           Imaging: All pertinent images and reports for the current visit were reviewed by me during this visit. No orders to display       Medications: All current and past medications were reviewed.  [START ON 9/30/2021] lisinopril  10 mg Oral Daily    meropenem  1,000 mg IntraVENous Q8H    linezolid  600 mg Oral 2 times per day    sodium chloride flush  10 mL IntraVENous 2 times per day    acetaminophen  650 mg Oral TID    insulin glargine  0.15 Units/kg SubCUTAneous Nightly    insulin lispro  0.08 Units/kg SubCUTAneous TID WC    insulin lispro  0-6 Units SubCUTAneous TID WC    insulin lispro  0-3 Units SubCUTAneous Nightly        sodium chloride 200 mL/hr at 09/27/21 1517    dextrose         sodium chloride flush, sodium chloride, promethazine **OR** ondansetron, HYDROcodone-acetaminophen, polyethylene glycol, glucose, dextrose, glucagon (rDNA), dextrose      Problem list:       Patient Active Problem List   Diagnosis Code    Cellulitis of left hand L03.114    Abscess of hand, left L02.512    Suppurative tenosynovitis of flexor tendon of left hand M65.142    Failure of outpatient treatment Z78.9    Poorly controlled type 2 diabetes mellitus ([facility] [address] E11.65    Elevated C-reactive protein (CRP) R79.82    Infection due to Enterobacter cloacae A49.8    Class 1 obesity due to excess calories with body mass index (BMI) of 34.0 to 34.9 in adult E66.09, Z68.34       Please note that this chart was generated using Dragon dictation software. Although every effort was made to ensure the accuracy of this automated transcription, some errors in transcription may have occurred inadvertently. If you may need any clarification, please do not hesitate to contact me through EPIC or at the phone number provided below with my electronic signature.   Any pictures or media included in this note were obtained after taking informed verbal consent from the patient and with their approval to include

## 2021-09-29 NOTE — PROGRESS NOTES
[facility] Orthopedic Surgery   Progress Note    CHIEF COMPLAINT/DIAGNOSIS: S/p LEFT middle finger and flexor tendon sheath I&D distal phalanx (9/27/21)    SUBJECTIVE: The patient is sitting up in the bed. Denies new issues. Pain controlled. 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 Nonsmoker. OBJECTIVE  Physical    VITALS:  BP (!) 146/82   Pulse 79   Temp 97.8 °F (36.6 °C) (Oral)   Resp 18   Ht 5' 9\" (1.753 m)   Wt 231 lb (104.8 kg)   SpO2 96%   BMI 34.11 kg/m²     GENERAL: Alert and oriented x3, in no acute distress. MUSCULOSKELETAL: Able to flex and extend the left wrist and all digits other than the middle DIP without issue. Limited ROM middle digit DIP as expected given swelling/incision. INCISION:  Covered with post-op dressing; clean, dry and intact. Dressing removed and incision soaked x 15 minutes. No purulent material noted. Ecchymosis and flaking skin noted. Open incision is clean and not actively draining. Sensory:  Intact to light touch in axillary, radial, median ulnar distributions including middle digit fingertip. Vascular:   2+ radial pulses with brisk cap refill. Data    ALL MEDICATIONS HAVE BEEN REVIEWED    CBC:   Recent Labs     09/27/21  1040 09/28/21  0650   WBC 8.2 10.3   HGB 15.4 13.1*   HCT 47.7 37.5*    331     BMP:   Recent Labs     09/27/21  1040   *   K 3.9   CL 98*   CO2 23   BUN 16   CREATININE 0.6*     INR: No results for input(s): INR in the last 72 hours. A1c 11.4    Surgical culture growing Enterobacter cloacae and Strep agalacticae  Pre-op wound culture: Enterobacter cloacae  Blood culture x 2 NGTD    ASSESSMENT:  S/p LEFT middle finger I&D including flexor tendon sheath for suppurative tenosynovitis (9/27/21), POD#2  Acute blood loss anemia as expected  DM II    PLAN:   Twice daily soapy water hand soaks followed by dressing change using adaptic, kerlix/ACE or coban.   Work note in

## 2021-09-29 NOTE — PLAN OF CARE
Pt educated how to use insulin pen needle with home needle he will be sent home with. Pt able to demonstrate how to appropriate administer his Humalog 8 units and Humalog SSI prior to his lunch. Pt educated how to use his Agametic glucometer. Pt noted to have callused skin on his fingers. Pt aware of med changed from IV ABX to oral. Pt aware EKG was ordered. This RN contacted EKG to perform EKG. Pt in bed watching tv at this time.      Debbie QUEVEDON, RN   [phone]

## 2021-09-29 NOTE — PLAN OF CARE
Message sent to provider regarding possible D/C order. ID signed off. Pt educated how to give insulin and test his glucose. Pt awaiting plan. Will continue to monitor.      Loan Bloodgonolan BSN, RN   [phone]

## 2021-09-29 NOTE — PLAN OF CARE
Problem: Pain:  Goal: Pain level will decrease  Description: Pain level will decrease  9/29/2021 0039 by Marva Sullivan RN  Outcome: Ongoing     Problem: Pain:  Goal: Control of acute pain  Description: Control of acute pain  9/29/2021 0039 by Marva Sullivan RN  Outcome: Ongoing     Problem: Pain:  Goal: Control of chronic pain  Description: Control of chronic pain  9/29/2021 0039 by Marva Sullivan RN  Outcome: Ongoing   Education Provided as followed:  \"Family/Patient made aware of the tailored interventions falls plan using the TIPS TOOL.

## 2021-09-29 NOTE — PROGRESS NOTES
Output --   Net 380 ml       Physical Exam Performed:    BP (!) 146/82   Pulse 79   Temp 97.8 °F (36.6 °C) (Oral)   Resp 18   Ht 5' 9\" (1.753 m)   Wt 231 lb (104.8 kg)   SpO2 96%   BMI 34.11 kg/m²     General appearance: No apparent distress, appears stated age and cooperative. HEENT: Pupils equal, round, and reactive to light. Conjunctivae/corneas clear. Neck: Supple, with full range of motion. No jugular venous distention. Trachea midline. Respiratory:  Normal respiratory effort. Clear to auscultation, bilaterally without Rales/Wheezes/Rhonchi. Cardiovascular: Regular rate and rhythm with normal S1/S2 without murmurs, rubs or gallops. Abdomen: Soft, non-tender, non-distended with normal bowel sounds. Musculoskeletal: No clubbing, cyanosis or edema bilaterally. Full range of motion without deformity. Skin: Skin color, texture, turgor normal.  No rashes or lesions. Neurologic:  Neurovascularly intact without any focal sensory/motor deficits. Cranial nerves: II-XII intact, grossly non-focal.  Psychiatric: Alert and oriented, thought content appropriate, normal insight  Capillary Refill: Brisk,3 seconds, normal   Peripheral Pulses: +2 palpable, equal bilaterally     Left middle finger with dressing in place. C/D/I. Labs:   Recent Labs     09/27/21  1040 09/28/21  0650   WBC 8.2 10.3   HGB 15.4 13.1*   HCT 47.7 37.5*    331     Recent Labs     09/27/21  1040   *   K 3.9   CL 98*   CO2 23   BUN 16   CREATININE 0.6*   CALCIUM 9.9     Recent Labs     09/27/21  1040   AST 15   ALT 13   BILITOT 0.6   ALKPHOS 95     No results for input(s): INR in the last 72 hours. No results for input(s): Jane Lew Pears in the last 72 hours.     Urinalysis:      Lab Results   Component Value Date    NITRU Negative 05/29/2012    WBCUA 20-40 05/29/2012    BACTERIA 1+ 05/29/2012    RBCUA 3-5 05/29/2012    BLOODU Small 05/29/2012    SPECGRAV 1.020 05/29/2012    GLUCOSEU 500 05/29/2012       Radiology:  No

## 2021-09-29 NOTE — DISCHARGE SUMMARY
[facility]    Patient Demographics    Jacqui. Baudilio File  Date of Birth. 1970  MRN. [id number]     Primary care provider. No primary care provider on file. (Tel: None)    Admit date: 9/27/2021    Discharge date (blank if same as Note Date): Note Date: 9/29/2021     Reason for Hospitalization. No chief complaint on file. Significant Findings. Active Problems:    Cellulitis of left hand    Abscess of hand, left    Suppurative tenosynovitis of flexor tendon of left hand    Failure of outpatient treatment    Poorly controlled type 2 diabetes mellitus (HCC)    Elevated C-reactive protein (CRP)    Infection due to Enterobacter cloacae    Class 1 obesity due to excess calories with body mass index (BMI) of 34.0 to 34.9 in adult    Diabetes education, encounter for    Weight loss counseling, encounter for  Resolved Problems:    * No resolved hospital problems. *       Problems and results from this hospitalization that need follow up. 1. Newly diagnosed DM on Insulin  2. Left hand infection  3. Hypertension    Significant test results and incidental findings. 1.   No orders to display     Invasive procedures and treatments. 1. s/p I&D, excisional debridement of the subcutaneous tissue and Fascia 9/27. [facility] Course. Sasha Henderson a 46 y. o. male with no known medical problems, presented with c/o painful left middle finger swelling, erythema and purulent draining for 9-10 days. Patient suspected a metal splinter which might have provoked an infection but he had presented to an urgent care 2 days after noticing the symptoms to an [facility] where he was prescribed Bactrim and Keflex. He had been on these for a week with no significant improvement with purulent and decreased function of L hand. He denied any fever, chills, N/V or systemic symptoms.  He was seen by orthopedic on admission and sent to the OR for I&D and debridement of subcutaneus tissue and Fascia.          Suppurative Tenosynovitis L Hand:  Ortho consulted: s/p I&D, excisional debridement of the subcutaneous tissue and Fascia 9/27. Wound cultures: Enterobacter cloacae   Intra op cultures: + for Enterobacter Cloacae & Strep agalactiae. Blood cultures: NGTD. ID consulted: Initially on Cefazolin then started on Zyvox and Merrem. Discharged on PO Zyvox and Cipro with Probiotics. Local wound care: Twice daily soapy water hand soaks followed by dressing change using adaptic, kerlix/ACE or coban. Pain control as needed. OP follow up with Dr. Mindy Avendaño in 1 week.        Newly Diagnosed DM:  A1c 11.4% 9/28/21. Diabetes educator consulted. Insulin teaching done. Discharged on Lantus and Lispro. Will need OP follow up for better glycemic control and further management.     Obesity BMI 34.9:   Dietary and lifestyle modification recommended.     Hypertension: started on Lisinopril. Consults. IP CONSULT TO DIABETES EDUCATOR  IP CONSULT TO FINANCIAL COUNSELOR  IP CONSULT TO DIETITIAN  IP CONSULT TO DIETITIAN  IP CONSULT TO INFECTIOUS DISEASES    Physical examination on discharge day. /82   Pulse 89   Temp 97.7 °F (36.5 °C) (Oral)   Resp 18   Ht 5' 9\" (1.753 m)   Wt 231 lb (104.8 kg)   SpO2 98%   BMI 34.11 kg/m²   General appearance. Alert. Looks comfortable. HEENT. Sclera clear. Moist mucus membranes. Cardiovascular. Regular rate and rhythm, normal S1, S2. No murmur. Respiratory. Not using accessory muscles. Clear to auscultation bilaterally, no wheeze. Gastrointestinal. Abdomen soft, non-tender, not distended, normal bowel sounds  Neurology. Facial symmetry. No speech deficits. Moving all extremities equally. Extremities. No edema in lower extremities. Skin. Warm, dry, normal turgor  Left middle finger with dressing in place. C/D/I. Limited ROM middle digit DIP as expected given swelling/incision. Condition at time of discharge: stable.     Medication instructions provided to patient at discharge. Medication List      START taking these medications    blood glucose monitor kit and supplies  Dispense sufficient amount for indicated testing frequency plus additional to accommodate PRN testing needs. Dispense all needed supplies to include: monitor, strips, lancing device, lancets, control solutions, alcohol swabs. Notes to patient: Glucose management      blood glucose test strips  Test 3 times a day & as needed for symptoms of irregular blood glucose. Dispense sufficient amount for indicated testing frequency plus additional to accommodate PRN testing needs. Notes to patient: Glucose management      ciprofloxacin 500 MG tablet  Commonly known as: CIPRO  Take 1 tablet by mouth every 12 hours for 21 days  Notes to patient: Antibiotic  Side Effects: Upset stomach, nausea     HYDROcodone-acetaminophen 5-325 MG per tablet  Commonly known as: Norco  Take 1 tablet by mouth every 6 hours as needed for Pain for up to 3 days. Intended supply: 3 days.  Take lowest dose possible to manage pain  Notes to patient: Pain control  Side Effects: constipation, upset stomach      insulin glargine 100 UNIT/ML injection pen  Commonly known as: LANTUS;BASAGLAR  Inject 20 Units into the skin nightly     insulin lispro (1 Unit Dial) 100 UNIT/ML Sopn  Inject 7 Units into the skin 3 times daily (with meals)     Lancets Misc  1 each by Does not apply route 3 times daily  Notes to patient: Diabetic management      linezolid 600 MG tablet  Commonly known as: ZYVOX  Take 1 tablet by mouth every 12 hours for 14 days  Notes to patient: Antibiotic  Side Effects: upset stomach, nausea      lisinopril 10 MG tablet  Commonly known as: PRINIVIL;ZESTRIL  Take 1 tablet by mouth daily  Start taking on: September 30, 2021     Probiotic Acidophilus Tabs  Take 1 tablet by mouth 2 times daily for 21 days  Notes to patient: [facility]  Side Effects: Diarrhea, nausea         STOP taking these medications    cephALEXin 500 MG capsule  Commonly known as: KEFLEX     sulfamethoxazole-trimethoprim 800-160 MG per tablet  Commonly known as: BACTRIM DS;SEPTRA DS           Where to Get Your Medications      These medications were sent to [address] & [facility], [address] [address]    Phone: [phone]   · HYDROcodone-acetaminophen 5-325 MG per tablet     These medications were sent to [place] [address] [phone] - [phone]  [address] [place] [address]    Phone: [phone]   · blood glucose monitor kit and supplies  · blood glucose test strips  · ciprofloxacin 500 MG tablet  · insulin glargine 100 UNIT/ML injection pen  · insulin lispro (1 Unit Dial) 100 UNIT/ML Sopn  · Lancets Misc  · linezolid 600 MG tablet  · lisinopril 10 MG tablet  · Probiotic Acidophilus Tabs         Discharge recommendations given to patient. Follow Up. With PCP and Orthopedic surgeon in 1 week   Disposition. home  Activity. activity as tolerated  Diet: ADULT DIET; Regular; 3 carb choices (45 gm/meal)      Spent 35 minutes in discharge process.     Signed:  Joseph Duarte MD     9/29/2021 3:13 PM

## 2021-09-29 NOTE — PLAN OF CARE
Problem: Falls - Risk of:  Goal: Will remain free from falls  Description: Will remain free from falls  Outcome: Ongoing  Goal: Absence of physical injury  Description: Absence of physical injury  Outcome: Ongoing     Problem: Pain:  Goal: Pain level will decrease  Description: Pain level will decrease  9/29/2021 0811 by Lilly Lipscomb RN  Outcome: Ongoing  9/29/2021 0039 by Jimmy Reed RN  Outcome: Ongoing  Goal: Control of acute pain  Description: Control of acute pain  9/29/2021 0811 by Lilly Lipscomb RN  Outcome: Ongoing  9/29/2021 0039 by Jimmy Reed RN  Outcome: Ongoing  Goal: Control of chronic pain  Description: Control of chronic pain  9/29/2021 0811 by Lilly Lipscomb RN  Outcome: Ongoing  9/29/2021 0039 by Jimmy Reed RN  Outcome: Ongoing    Lilly SUNSHINE RN   [phone]

## 2021-10-01 LAB
BLOOD CULTURE, ROUTINE: NORMAL
CULTURE, BLOOD 2: NORMAL

## 2021-10-03 LAB
ANAEROBIC CULTURE: ABNORMAL
CULTURE SURGICAL: ABNORMAL
ORGANISM: ABNORMAL

## 2021-10-08 ENCOUNTER — OFFICE VISIT (OUTPATIENT)
Dept: INTERNAL MEDICINE CLINIC | Age: 51
End: 2021-10-08
Payer: COMMERCIAL

## 2021-10-08 ENCOUNTER — OFFICE VISIT (OUTPATIENT)
Dept: ORTHOPEDIC SURGERY | Age: 51
End: 2021-10-08

## 2021-10-08 VITALS
BODY MASS INDEX: 37.42 KG/M2 | WEIGHT: 238.38 LBS | HEIGHT: 67 IN | DIASTOLIC BLOOD PRESSURE: 88 MMHG | SYSTOLIC BLOOD PRESSURE: 130 MMHG | OXYGEN SATURATION: 99 % | HEART RATE: 127 BPM

## 2021-10-08 VITALS — WEIGHT: 231 LBS | BODY MASS INDEX: 34.21 KG/M2 | HEIGHT: 69 IN

## 2021-10-08 DIAGNOSIS — Z76.89 ENCOUNTER TO ESTABLISH CARE WITH NEW DOCTOR: ICD-10-CM

## 2021-10-08 DIAGNOSIS — M65.9 FLEXOR TENOSYNOVITIS OF FINGER: Primary | ICD-10-CM

## 2021-10-08 DIAGNOSIS — I15.2 OBESITY, DIABETES, AND HYPERTENSION SYNDROME (HCC): ICD-10-CM

## 2021-10-08 DIAGNOSIS — E11.9 DIABETES MELLITUS WITH COINCIDENT HYPERTENSION (HCC): ICD-10-CM

## 2021-10-08 DIAGNOSIS — Z00.00 ENCOUNTER FOR WELL ADULT EXAM WITHOUT ABNORMAL FINDINGS: ICD-10-CM

## 2021-10-08 DIAGNOSIS — Z12.11 SCREENING FOR COLORECTAL CANCER: ICD-10-CM

## 2021-10-08 DIAGNOSIS — E11.69 OBESITY, DIABETES, AND HYPERTENSION SYNDROME (HCC): ICD-10-CM

## 2021-10-08 DIAGNOSIS — E11.59 OBESITY, DIABETES, AND HYPERTENSION SYNDROME (HCC): ICD-10-CM

## 2021-10-08 DIAGNOSIS — E66.9 OBESITY, DIABETES, AND HYPERTENSION SYNDROME (HCC): ICD-10-CM

## 2021-10-08 DIAGNOSIS — I10 DIABETES MELLITUS WITH COINCIDENT HYPERTENSION (HCC): ICD-10-CM

## 2021-10-08 DIAGNOSIS — E11.65 POORLY CONTROLLED TYPE 2 DIABETES MELLITUS (HCC): Primary | ICD-10-CM

## 2021-10-08 DIAGNOSIS — Z12.12 SCREENING FOR COLORECTAL CANCER: ICD-10-CM

## 2021-10-08 PROBLEM — L03.114 CELLULITIS OF LEFT HAND: Status: RESOLVED | Noted: 2021-09-27 | Resolved: 2021-10-08

## 2021-10-08 PROCEDURE — 90674 CCIIV4 VAC NO PRSV 0.5 ML IM: CPT | Performed by: FAMILY MEDICINE

## 2021-10-08 PROCEDURE — 90471 IMMUNIZATION ADMIN: CPT | Performed by: FAMILY MEDICINE

## 2021-10-08 PROCEDURE — 99204 OFFICE O/P NEW MOD 45 MIN: CPT | Performed by: FAMILY MEDICINE

## 2021-10-08 PROCEDURE — 99024 POSTOP FOLLOW-UP VISIT: CPT | Performed by: ORTHOPAEDIC SURGERY

## 2021-10-08 RX ORDER — METFORMIN HYDROCHLORIDE 500 MG/1
1000 TABLET, EXTENDED RELEASE ORAL
Qty: 60 TABLET | Refills: 0 | Status: SHIPPED | OUTPATIENT
Start: 2021-10-08 | End: 2021-11-08 | Stop reason: SDUPTHER

## 2021-10-08 ASSESSMENT — ENCOUNTER SYMPTOMS
CONSTIPATION: 0
SHORTNESS OF BREATH: 0
VOMITING: 0
DIARRHEA: 0
COUGH: 0
NAUSEA: 0

## 2021-10-08 ASSESSMENT — PATIENT HEALTH QUESTIONNAIRE - PHQ9
SUM OF ALL RESPONSES TO PHQ9 QUESTIONS 1 & 2: 0
SUM OF ALL RESPONSES TO PHQ QUESTIONS 1-9: 0
SUM OF ALL RESPONSES TO PHQ QUESTIONS 1-9: 0
1. LITTLE INTEREST OR PLEASURE IN DOING THINGS: 0
SUM OF ALL RESPONSES TO PHQ QUESTIONS 1-9: 0
2. FEELING DOWN, DEPRESSED OR HOPELESS: 0

## 2021-10-08 NOTE — PROGRESS NOTES
Vaccine Information Sheet, \"Influenza - Inactivated\"  given to Jackie Jolley, or parent/legal guardian of  Jackie Jolley and verbalized understanding. Patient responses:    Have you ever had a reaction to a flu vaccine? No  Do you have any current illness? No  Have you ever had Guillian Scotts Syndrome? No  Do you have a serious allergy to any of the follow: Neomycin, Polymyxin, Thimerosal, eggs or egg products? No    Flu vaccine given per order. Please see immunization tab. Risks and benefits explained. Current VIS given.       Immunizations Administered     Name Date Dose Route    Influenza, MDCK Quadv, IM, PF (Flucelvax 2 yrs and older) 10/8/2021 0.5 mL Intramuscular    Site: Deltoid- Right    Lot: [id number]    [address]: 06529-151-88

## 2021-10-08 NOTE — PROGRESS NOTES
Britta Garcia (:  1970) is a 46 y.o. male,New patient, here for evaluation of the following chief complaint(s):  New Patient (hospital discharge [facility] 21)      SUBJECTIVE:  New patient  Poorly controlled diabetes, Last A1c was 11.4% on 21 when he was in the hospital. Had to have infection drained from left third finger. Still off of work, works as a day . States he never goes to the doctor. Had 'never been sick' but now was told he has diabetes. Did have his COVID vaccines, dates updated in computer. I have reviewed the chart notes available from myself and other providers (ER visit and labs from that ER and hospital visit). I have reviewed and addressed all active problems and created or updated the problems list in detail, as needed    I have extensively reviewed and reconciled the medication list, discontinued medications not taking or no longer appropriate, and updated the active meds list    OBJECTIVE:  Review of Systems   Constitutional: Negative for chills and fever. Respiratory: Negative for cough and shortness of breath. Cardiovascular: Negative for leg swelling. Gastrointestinal: Negative for constipation, diarrhea, nausea and vomiting. Endocrine: Negative for polyuria. Genitourinary: Negative for frequency. Skin: Negative for rash. Vitals:    10/08/21 1108 10/08/21 1255   BP: (!) 131/93 130/88   Site: Right Upper Arm Right Upper Arm   Pulse: 127    SpO2: 99%    Weight: 238 lb 6 oz (108.1 kg)    Height: 5' 7\" (1.702 m)       Body mass index is 37.33 kg/m². Physical Exam  Constitutional:       Appearance: Normal appearance. Cardiovascular:      Rate and Rhythm: Normal rate and regular rhythm. Pulses: Normal pulses. Heart sounds: Normal heart sounds. Pulmonary:      Effort: Pulmonary effort is normal.      Breath sounds: Normal breath sounds. Musculoskeletal:      Right lower leg: No edema. Left lower leg: No edema. Comments: Left middle finger wrapped with ACE bandage from ortho-hand office    Neurological:      General: No focal deficit present. Mental Status: He is alert. Mental status is at baseline. Lab Results   Component Value Date    LABA1C 11.4 09/28/2021     Lab Results   Component Value Date    WBC 10.3 09/28/2021    HGB 13.1 (L) 09/28/2021    HCT 37.5 (L) 09/28/2021    MCV 91.2 09/28/2021     09/28/2021      Lab Results   Component Value Date     09/27/2021    K 3.9 09/27/2021    CL 98 09/27/2021    CO2 23 09/27/2021    BUN 16 09/27/2021    CREATININE 0.6 09/27/2021    GLUCOSE 281 09/27/2021    CALCIUM 9.9 09/27/2021           Patient received counseling and, if relevant, printed instructions for all symptoms listed in CC and HPI, as well as for all diagnoses brought onto today's visit note below. Typical counseling includes, but is not limited to, non-pharmacologic measures to manage listed symptoms and conditions; appropriate use, risks and benefits for all prescribed medications; potential interactions between medications both prescribed and OTC; diet; exercise; healthy behaviors; and goalsetting to improve health. Patient or responsible party was involved in shared decision making and had opportunity to have all questions answered. Except as noted below, all chronic problems have been reviewed and are stable to continue medications or other therapy as previously documented in the patient's chart, with changes per orders or documentation below:    1. Poorly controlled type 2 diabetes mellitus (HCC)  -     CBC Auto Differential; Future  -     Lipid Panel; Future  -     Comprehensive Metabolic Panel; Future  -     Microalbumin / Creatinine Urine Ratio; Future  -     metFORMIN (GLUCOPHAGE-XR) 500 MG extended release tablet; Take 2 tablets by mouth daily (with breakfast), Disp-60 tablet, R-0Normal  2.  Diabetes mellitus with coincident hypertension (HCC)  -     CBC Auto Differential; Future  -     Lipid Panel; Future  -     Comprehensive Metabolic Panel; Future  -     Microalbumin / Creatinine Urine Ratio; Future  -     metFORMIN (GLUCOPHAGE-XR) 500 MG extended release tablet; Take 2 tablets by mouth daily (with breakfast), Disp-60 tablet, R-0Normal  3. Obesity, diabetes, and hypertension syndrome (HCC)  -     Microalbumin / Creatinine Urine Ratio; Future  -     metFORMIN (GLUCOPHAGE-XR) 500 MG extended release tablet; Take 2 tablets by mouth daily (with breakfast), Disp-60 tablet, R-0Normal  4. Encounter to establish care with new doctor  -     Hepatitis C Antibody; Future  -     HIV Screen; Future  5. Encounter for well adult exam without abnormal findings  6. Screening for colorectal cancer  -     Cologuard;  Future        Problem List        Unprioritized    Poorly controlled type 2 diabetes mellitus ([facility] [address] - Primary    Relevant Medications    insulin glargine (LANTUS;BASAGLAR) 100 UNIT/ML injection pen    insulin lispro, 1 Unit Dial, 100 UNIT/ML SOPN    metFORMIN (GLUCOPHAGE-XR) 500 MG extended release tablet    Other Relevant Orders    CBC Auto Differential    Lipid Panel    Comprehensive Metabolic Panel    Microalbumin / Creatinine Urine Ratio    Diabetes mellitus with coincident hypertension (HCC)    Relevant Medications    insulin glargine (LANTUS;BASAGLAR) 100 UNIT/ML injection pen    insulin lispro, 1 Unit Dial, 100 UNIT/ML SOPN    metFORMIN (GLUCOPHAGE-XR) 500 MG extended release tablet    Other Relevant Orders    CBC Auto Differential    Lipid Panel    Comprehensive Metabolic Panel    Microalbumin / Creatinine Urine Ratio    Obesity, diabetes, and hypertension syndrome (HCC)    Relevant Medications    insulin glargine (LANTUS;BASAGLAR) 100 UNIT/ML injection pen    insulin lispro, 1 Unit Dial, 100 UNIT/ML SOPN    metFORMIN (GLUCOPHAGE-XR) 500 MG extended release tablet    Other Relevant Orders    Microalbumin / Creatinine Urine Ratio        Orders Placed This Encounter   Procedures    Kristina     Fax to [facility] Status:   Future     Standing Expiration Date:   10/8/2022    INFLUENZA, MDCK QUADV, 2 YRS AND OLDER, IM, PF, PREFILL SYR OR SDV, 0.5ML (FLUCELVAX QUADV, PF)    Hepatitis C Antibody     Standing Status:   Future     Number of Occurrences:   1     Standing Expiration Date:   10/8/2022    HIV Screen     Standing Status:   Future     Number of Occurrences:   1     Standing Expiration Date:   10/8/2022    CBC Auto Differential     Standing Status:   Future     Number of Occurrences:   1     Standing Expiration Date:   10/8/2022    Lipid Panel     Standing Status:   Future     Number of Occurrences:   1     Standing Expiration Date:   10/8/2022     Order Specific Question:   Is Patient Fasting?/# of Hours     Answer:   y    Comprehensive Metabolic Panel     Standing Status:   Future     Number of Occurrences:   1     Standing Expiration Date:   10/8/2022    Microalbumin / Creatinine Urine Ratio     Standing Status:   Future     Number of Occurrences:   1     Standing Expiration Date:   10/8/2022       Return in about 4 weeks (around 11/5/2021) for lab followup, med follow up. [facility] - Internal Medicine and Pediatrics  Dr. Fidel Montana D.O.  - Family Medicine and T      Usual doctor's hours are:     Monday 7:30 am to 6:00 pm           Tuesday  7:30 am to 5:00 pm                                               Wednesday 7:30 am to 5:00 pm                                               Thursday 7:30 am to 5:00 pm                                               Friday 7:30 am to 4:00 pm           Saturdays, Sundays, and after hours: E-Visits are available    We observe most federal holidays and Good Friday. We ask that you only contact the office one time per issue or question, and please allow one full business day for a call back.  Calling us back multiple times keeps us from being able to complete the work efficiently for you and our other patients. For medication renewals, please call your pharmacist to contact us, and be sure to allow at least 3 business days for processing before you need to  your medication. If you are sick or need an appointment that hasn't been planned, same day appointments are available every day the office is open: Monday, Tuesday, Wednesday, Thursday, and Friday. Call during office hours to schedule, even if it may not be with your regular physician. You may also call the office after 8 am on office days if you need to be seen from an issue the night before. During hours when the office is not normally open, your call will go to the messaging service which cannot provide any service other than paging the doctor. No prescriptions or other nonurgent matters will be handled and no voicemail is available, so please call back during office hours for these matters.        Electronically signed by Bladimir Lawson DO on 10/8/2021 at 2:11 PM.

## 2021-10-08 NOTE — PROGRESS NOTES
DIAGNOSIS:  Left hand flexor tenosynovitis incision and drainage. DATE OF SURGERY:  9/27/2021. HISTORY OF PRESENT ILLNESS:  Mr. Vega 46 y.o. RHD  male who came in today for 2 weeks postoperative visit. The patient denies any significant pain in the left long finger. He has been changing Drsg Daily. No numbness or tingling sensation. No fever or Chills. He grew up Enterobacter cloacae complex, BHS Group B (Strep agalacticae), Beta Strep Group G, and Peptoniphilus asaccharolyticus,  on PO Zyvox, Cipro and Floranex. Past Medical History:   Diagnosis Date    Abscess of hand, left     Cellulitis of left hand 9/27/2021    Diabetes education, encounter for     DM (diabetes mellitus) ([facility] 09/27/2021    Infection due to Enterobacter cloacae     Suppurative tenosynovitis of flexor tendon of left hand        Past Surgical History:   Procedure Laterality Date    FINGER SURGERY Left 09/27/2021    I&D done on left 3rd finger    HAND SURGERY Left 9/27/2021    LEFT HAND INCISION AND DRAINAGE MIDDLE FINGER performed by Char Glover MD at [address] [place] [address] History     Socioeconomic History    Marital status: Single     Spouse name: Not on file    Number of children: Not on file    Years of education: Not on file    Highest education level: Not on file   Occupational History    Not on file   Tobacco Use    Smoking status: Former Smoker    Smokeless tobacco: Current User     Types: Snuff   Vaping Use    Vaping Use: Never used   Substance and Sexual Activity    Alcohol use:  Yes     Alcohol/week: 4.0 standard drinks     Types: 4 Cans of beer per week    Drug use: Never    Sexual activity: Not on file   Other Topics Concern    Not on file   Social History Narrative    Not on file     Social Determinants of Health     Financial Resource Strain:     Difficulty of Paying Living Expenses:    Food Insecurity:     Worried About Running Out of Food in the Last Year:     Ran Out of Food in the Last Year:    Transportation Needs:     Lack of Transportation (Medical):  Lack of Transportation (Non-Medical):    Physical Activity:     Days of Exercise per Week:     Minutes of Exercise per Session:    Stress:     Feeling of Stress :    Social Connections:     Frequency of Communication with Friends and Family:     Frequency of Social Gatherings with Friends and Family:     Attends Jew Services:     Active Member of Clubs or Organizations:     Attends Club or Organization Meetings:     Marital Status:    Intimate Partner Violence:     Fear of Current or Ex-Partner:     Emotionally Abused:     Physically Abused:     Sexually Abused:        Family History   Problem Relation Age of Onset    No Known Problems Mother     No Known Problems Father        Current Outpatient Medications on File Prior to Visit   Medication Sig Dispense Refill    linezolid (ZYVOX) 600 MG tablet Take 1 tablet by mouth every 12 hours for 14 days 28 tablet 0    ciprofloxacin (CIPRO) 500 MG tablet Take 1 tablet by mouth every 12 hours for 21 days 42 tablet 0    Probiotic Acidophilus (FLORANEX) TABS Take 1 tablet by mouth 2 times daily for 21 days 42 tablet 0    insulin glargine (LANTUS;BASAGLAR) 100 UNIT/ML injection pen Inject 20 Units into the skin nightly 5 pen 3    lisinopril (PRINIVIL;ZESTRIL) 10 MG tablet Take 1 tablet by mouth daily 30 tablet 3    insulin lispro, 1 Unit Dial, 100 UNIT/ML SOPN Inject 7 Units into the skin 3 times daily (with meals) 6.3 mL 1    Insulin Pen Needle 32G X 4 MM MISC 1 each by Does not apply route 4 times daily (before meals and nightly) 100 each 3    Alcohol Swabs (ALCOHOL PREP) 70 % PADS 1 each by Does not apply route 4 times daily (before meals and nightly) 100 each 3    blood glucose monitor kit and supplies Dispense sufficient amount for indicated testing frequency plus additional to accommodate PRN testing needs.  Dispense all needed supplies to include: monitor, strips, lancing device, lancets, control solutions, alcohol swabs. 1 kit 0    blood glucose monitor strips Test 3 times a day & as needed for symptoms of irregular blood glucose. Dispense sufficient amount for indicated testing frequency plus additional to accommodate PRN testing needs. 100 strip 3    Lancets MISC 1 each by Does not apply route 3 times daily 200 each 3    metFORMIN (GLUCOPHAGE-XR) 500 MG extended release tablet Take 2 tablets by mouth daily (with breakfast) 60 tablet 0     No current facility-administered medications on file prior to visit. Pertinent items are noted in HPI  Review of systems reviewed from Patient History Form dated on 10/8/2021 and available in the patient's chart under the Media tab. No change noted. PHYSICAL EXAMINATION:  Mr. Vega is a very pleasant 46 y.o.  male who presents today in no acute distress, awake, alert, and oriented. He is well dressed, nourished and  groomed. Patient with normal affect. Height is  5' 9\" (1.753 m), weight is 231 lb (104.8 kg), Body mass index is 34.11 kg/m². Resting respiratory rate is 16. The patient walks with no limp. The incision healing well left hand long finger. No signs of any erythema or drainage, moderate swelling. He has minimal pain with the active or passive range of motion of the left long finger, decrease ROM. He has intact sensation distally, and he is neurovascularly intact. IMPRESSION:  2 weeks out from left hand flexor tenosynovitis incision and drainage, and doing very well. PLAN: He can go back to normal activity with no heavy impact activities for 6 weeks. ROM left hand. Dressing change daily. Continue PO ABX. The patient will come back for a follow up in 2 weeks. The patient understands that there is a chance of abscess recurrence even after surgical I&D.        Lei Levin MD

## 2021-10-22 ENCOUNTER — OFFICE VISIT (OUTPATIENT)
Dept: ORTHOPEDIC SURGERY | Age: 51
End: 2021-10-22
Payer: COMMERCIAL

## 2021-10-22 VITALS — WEIGHT: 245 LBS | HEIGHT: 67 IN | BODY MASS INDEX: 38.45 KG/M2

## 2021-10-22 DIAGNOSIS — M25.642 FINGER STIFFNESS, LEFT: Primary | ICD-10-CM

## 2021-10-22 DIAGNOSIS — M65.9 FLEXOR TENOSYNOVITIS OF FINGER: ICD-10-CM

## 2021-10-22 PROCEDURE — 99213 OFFICE O/P EST LOW 20 MIN: CPT | Performed by: ORTHOPAEDIC SURGERY

## 2021-10-22 NOTE — LETTER
[facility]  [facility] [address] [address]  Phone: [phone]  Fax: [phone]    Conrado Ortiz MD        October 22, 2021     Patient: Eric Howard   YOB: 1970   Date of Visit: 10/22/2021       To Whom It May Concern: It is my medical opinion that Eric Howard should remain out of work until 11-5-21. May return on 11-8-21. [facility] should be excused for the dates off work between 9-27-21 to 10-22-21. If you have any questions or concerns, please don't hesitate to call.     Sincerely,          Conrado Ortiz MD

## 2021-10-23 PROBLEM — M25.642 FINGER STIFFNESS, LEFT: Status: ACTIVE | Noted: 2021-10-23

## 2021-10-23 NOTE — PROGRESS NOTES
DIAGNOSIS:   1- Left hand flexor tenosynovitis incision and drainage. 2- Left long finger stiffness, new. DATE OF SURGERY:  9/27/2021. HISTORY OF PRESENT ILLNESS:  Mr. NAIR [facility], [place] 46 y.o. RHD  male who came in today for f/u. The patient denies any significant pain in the left long finger. He has been changing Drsg Daily. No numbness or tingling sensation. No fever or Chills. He grew up Enterobacter cloacae complex, BHS Group B (Strep agalacticae), Beta Strep Group G, and Peptoniphilus asaccharolyticus,  on PO Zyvox, Cipro and Floranex. Past Medical History:   Diagnosis Date    Abscess of hand, left     Cellulitis of left hand 9/27/2021    Diabetes education, encounter for     [facility] 09/27/2021    Infection due to Enterobacter cloacae     Suppurative tenosynovitis of flexor tendon of left hand        Past Surgical History:   Procedure Laterality Date    FINGER SURGERY Left 09/27/2021    I&D done on left 3rd finger    HAND SURGERY Left 9/27/2021    LEFT HAND INCISION AND DRAINAGE MIDDLE FINGER performed by Kim Guzman MD at [address] [place] [address] History     Socioeconomic History    Marital status: Single     Spouse name: Not on file    Number of children: Not on file    Years of education: Not on file    Highest education level: Not on file   Occupational History    Not on file   Tobacco Use    Smoking status: Former Smoker    Smokeless tobacco: Current User     Types: Snuff   Vaping Use    Vaping Use: Never used   Substance and Sexual Activity    Alcohol use:  Yes     Alcohol/week: 4.0 standard drinks     Types: 4 Cans of beer per week    Drug use: Never    Sexual activity: Not on file   Other Topics Concern    Not on file   Social History Narrative    Not on file     Social Determinants of Health     Financial Resource Strain:     Difficulty of Paying Living Expenses:    Food Insecurity:     Worried About Running Out of Food in the Last Year:     Ran Out of Food in the Last Year:    Transportation Needs:     Lack of Transportation (Medical):  Lack of Transportation (Non-Medical):    Physical Activity:     Days of Exercise per Week:     Minutes of Exercise per Session:    Stress:     Feeling of Stress :    Social Connections:     Frequency of Communication with Friends and Family:     Frequency of Social Gatherings with Friends and Family:     Attends R[employer] Services:     Active Member of Clubs or Organizations:     Attends Club or Organization Meetings:     Marital Status:    Intimate Partner Violence:     Fear of Current or Ex-Partner:     Emotionally Abused:     Physically Abused:     Sexually Abused:        Family History   Problem Relation Age of Onset    No Known Problems Mother     No Known Problems Father        Current Outpatient Medications on File Prior to Visit   Medication Sig Dispense Refill    metFORMIN (GLUCOPHAGE-XR) 500 MG extended release tablet Take 2 tablets by mouth daily (with breakfast) 60 tablet 0    insulin glargine (LANTUS;BASAGLAR) 100 UNIT/ML injection pen Inject 20 Units into the skin nightly 5 pen 3    lisinopril (PRINIVIL;ZESTRIL) 10 MG tablet Take 1 tablet by mouth daily 30 tablet 3    insulin lispro, 1 Unit Dial, 100 UNIT/ML SOPN Inject 7 Units into the skin 3 times daily (with meals) 6.3 mL 1    Insulin Pen Needle 32G X 4 MM MISC 1 each by Does not apply route 4 times daily (before meals and nightly) 100 each 3    Alcohol Swabs (ALCOHOL PREP) 70 % PADS 1 each by Does not apply route 4 times daily (before meals and nightly) 100 each 3    blood glucose monitor kit and supplies Dispense sufficient amount for indicated testing frequency plus additional to accommodate PRN testing needs. Dispense all needed supplies to include: monitor, strips, lancing device, lancets, control solutions, alcohol swabs.  1 kit 0    blood glucose monitor strips Test 3 times a day & as needed for symptoms of irregular blood glucose. Dispense sufficient amount for indicated testing frequency plus additional to accommodate PRN testing needs. 100 strip 3    Lancets MISC 1 each by Does not apply route 3 times daily 200 each 3     No current facility-administered medications on file prior to visit. Pertinent items are noted in HPI  Review of systems reviewed from Patient History Form dated on 10/8/2021 and available in the patient's chart under the Media tab. No change noted. PHYSICAL EXAMINATION:  Mr. Vega is a very pleasant 46 y.o.  male who presents today in no acute distress, awake, alert, and oriented. He is well dressed, nourished and  groomed. Patient with normal affect. Height is  5' 7\" (1.702 m), weight is 245 lb (111.1 kg), Body mass index is 38.37 kg/m². Resting respiratory rate is 16. The patient walks with no limp. The incision healing well left hand long finger. No signs of any erythema or drainage, moderate swelling. He has minimal pain with the active or passive range of motion of the left long finger, decrease ROM with stiffness. He has intact sensation distally, and he is neurovascularly intact. IMPRESSION:    1- Left hand flexor tenosynovitis incision and drainage. 2- Left long finger stiffness, new. PLAN: He can go back to normal activity with no heavy impact activities for 6 weeks. Aggressive ROM left hand. Dressing change daily. Continue PO ABX. I discussed with the patient that I think that he would really benefit from a course of OT for further strengthening and stretching. An Rx for physical therapy was given to the patient. The patient will come back for a follow up in 3 weeks. The patient understands that there is a chance of abscess recurrence even after surgical I&D.        Marko Gonzalez MD

## 2021-11-08 DIAGNOSIS — E11.65 POORLY CONTROLLED TYPE 2 DIABETES MELLITUS (HCC): ICD-10-CM

## 2021-11-08 DIAGNOSIS — E11.9 DIABETES MELLITUS WITH COINCIDENT HYPERTENSION (HCC): ICD-10-CM

## 2021-11-08 DIAGNOSIS — I10 DIABETES MELLITUS WITH COINCIDENT HYPERTENSION (HCC): ICD-10-CM

## 2021-11-08 DIAGNOSIS — I15.2 OBESITY, DIABETES, AND HYPERTENSION SYNDROME (HCC): ICD-10-CM

## 2021-11-08 DIAGNOSIS — E66.9 OBESITY, DIABETES, AND HYPERTENSION SYNDROME (HCC): ICD-10-CM

## 2021-11-08 DIAGNOSIS — E11.59 OBESITY, DIABETES, AND HYPERTENSION SYNDROME (HCC): ICD-10-CM

## 2021-11-08 DIAGNOSIS — E11.69 OBESITY, DIABETES, AND HYPERTENSION SYNDROME (HCC): ICD-10-CM

## 2021-11-08 RX ORDER — METFORMIN HYDROCHLORIDE 500 MG/1
1000 TABLET, EXTENDED RELEASE ORAL
Qty: 60 TABLET | Refills: 0 | Status: SHIPPED | OUTPATIENT
Start: 2021-11-08 | End: 2021-12-06 | Stop reason: SDUPTHER

## 2021-11-08 NOTE — TELEPHONE ENCOUNTER
Requested Prescriptions     Pending Prescriptions Disp Refills    metFORMIN (GLUCOPHAGE-XR) 500 MG extended release tablet 60 tablet 0     Sig: Take 2 tablets by mouth daily (with breakfast)     Patient requesting a medication refill.     Next office visit: 11/17/2021    Last regular office visit: 10/8/2021

## 2021-11-19 ENCOUNTER — OFFICE VISIT (OUTPATIENT)
Dept: ORTHOPEDIC SURGERY | Age: 51
End: 2021-11-19
Payer: COMMERCIAL

## 2021-11-19 ENCOUNTER — OFFICE VISIT (OUTPATIENT)
Dept: INTERNAL MEDICINE CLINIC | Age: 51
End: 2021-11-19
Payer: COMMERCIAL

## 2021-11-19 VITALS
OXYGEN SATURATION: 98 % | WEIGHT: 253 LBS | HEART RATE: 99 BPM | DIASTOLIC BLOOD PRESSURE: 104 MMHG | SYSTOLIC BLOOD PRESSURE: 165 MMHG | BODY MASS INDEX: 39.63 KG/M2

## 2021-11-19 VITALS — BODY MASS INDEX: 38.45 KG/M2 | HEIGHT: 67 IN | WEIGHT: 245 LBS

## 2021-11-19 DIAGNOSIS — E11.9 DIABETES MELLITUS WITH COINCIDENT HYPERTENSION (HCC): ICD-10-CM

## 2021-11-19 DIAGNOSIS — E11.69 OBESITY, DIABETES, AND HYPERTENSION SYNDROME (HCC): ICD-10-CM

## 2021-11-19 DIAGNOSIS — I15.2 OBESITY, DIABETES, AND HYPERTENSION SYNDROME (HCC): ICD-10-CM

## 2021-11-19 DIAGNOSIS — E11.65 POORLY CONTROLLED TYPE 2 DIABETES MELLITUS (HCC): ICD-10-CM

## 2021-11-19 DIAGNOSIS — I10 DIABETES MELLITUS WITH COINCIDENT HYPERTENSION (HCC): ICD-10-CM

## 2021-11-19 DIAGNOSIS — E11.59 OBESITY, DIABETES, AND HYPERTENSION SYNDROME (HCC): ICD-10-CM

## 2021-11-19 DIAGNOSIS — M25.642 FINGER STIFFNESS, LEFT: ICD-10-CM

## 2021-11-19 DIAGNOSIS — I10 ESSENTIAL HYPERTENSION: Primary | ICD-10-CM

## 2021-11-19 DIAGNOSIS — M65.9 FLEXOR TENOSYNOVITIS OF FINGER: Primary | ICD-10-CM

## 2021-11-19 DIAGNOSIS — E66.9 OBESITY, DIABETES, AND HYPERTENSION SYNDROME (HCC): ICD-10-CM

## 2021-11-19 PROCEDURE — 99213 OFFICE O/P EST LOW 20 MIN: CPT | Performed by: ORTHOPAEDIC SURGERY

## 2021-11-19 PROCEDURE — 99214 OFFICE O/P EST MOD 30 MIN: CPT | Performed by: FAMILY MEDICINE

## 2021-11-19 RX ORDER — LISINOPRIL 10 MG/1
20 TABLET ORAL DAILY
Qty: 60 TABLET | Refills: 3 | Status: SHIPPED | OUTPATIENT
Start: 2021-11-19 | End: 2022-03-07 | Stop reason: SDUPTHER

## 2021-11-19 ASSESSMENT — ENCOUNTER SYMPTOMS
VOMITING: 0
SHORTNESS OF BREATH: 0
CONSTIPATION: 0
DIARRHEA: 0
NAUSEA: 0
COUGH: 0

## 2021-11-19 NOTE — PROGRESS NOTES
Manuel Bloom (:  1970) is a 46 y.o. male,Established patient, here for evaluation of the following chief complaint(s):  Follow-up      SUBJECTIVE:  Has been back to work for 2 weeks, has done well. Does see Dr. Maru Cabrales (ortho-hand) for his tenosynovitis. Saw ortho this am, doing well and is able to work without restrictions. BP is slightly elevated, will increase medicines to 20 mg daily lisinopril from 10 mg dosing daily before. Needs opthalmology and podiatry visits due to DM, he will schedule with eye doctor that he saw in the past.  Referred to podiatry. 10.22.21:  New patient  Poorly controlled diabetes, Last A1c was 11.4% on 21 when he was in the hospital. Had to have infection drained from left third finger. Still off of work, works as a day . States he never goes to the doctor. Had 'never been sick' but now was told he has diabetes. Did have his COVID vaccines, dates updated in computer. I have reviewed the chart notes available from myself and other providers (ER visit and labs from that ER and hospital visit). I have reviewed and addressed all active problems and created or updated the problems list in detail, as needed    I have extensively reviewed and reconciled the medication list, discontinued medications not taking or no longer appropriate, and updated the active meds list    OBJECTIVE:  Review of Systems   Constitutional: Negative for chills and fever. Respiratory: Negative for cough and shortness of breath. Cardiovascular: Negative for leg swelling. Gastrointestinal: Negative for constipation, diarrhea, nausea and vomiting. Endocrine: Negative for polyuria. Genitourinary: Negative for frequency. Skin: Negative for rash. Vitals:    21 1341   BP: (!) 165/104   Pulse: 99   SpO2: 98%   Weight: 253 lb (114.8 kg)      Body mass index is 39.63 kg/m². Physical Exam  Constitutional:       Appearance: Normal appearance.    Cardiovascular: Rate and Rhythm: Normal rate and regular rhythm. Pulses: Normal pulses. Heart sounds: Normal heart sounds. Pulmonary:      Effort: Pulmonary effort is normal.      Breath sounds: Normal breath sounds. Musculoskeletal:      Right lower leg: No edema. Left lower leg: No edema. Comments: Left distal middle finger with some erythema at DIP, as well as scabbing on the pad of the finger. Being seen by ortho-hand for resolving tenosynovitis   Neurological:      General: No focal deficit present. Mental Status: He is alert. Mental status is at baseline. Lab Results   Component Value Date    LABA1C 11.4 09/28/2021     Lab Results   Component Value Date    WBC 8.9 10/08/2021    HGB 14.6 10/08/2021    HCT 43.3 10/08/2021    MCV 90.3 10/08/2021     10/08/2021      Lab Results   Component Value Date     10/08/2021    K 4.7 10/08/2021     10/08/2021    CO2 22 10/08/2021    BUN 16 10/08/2021    CREATININE 0.8 10/08/2021    GLUCOSE 188 10/08/2021    CALCIUM 9.9 10/08/2021           Patient received counseling and, if relevant, printed instructions for all symptoms listed in CC and HPI, as well as for all diagnoses brought onto today's visit note below. Typical counseling includes, but is not limited to, non-pharmacologic measures to manage listed symptoms and conditions; appropriate use, risks and benefits for all prescribed medications; potential interactions between medications both prescribed and OTC; diet; exercise; healthy behaviors; and goalsetting to improve health. Patient or responsible party was involved in shared decision making and had opportunity to have all questions answered. Except as noted below, all chronic problems have been reviewed and are stable to continue medications or other therapy as previously documented in the patient's chart, with changes per orders or documentation below:    1.  Essential hypertension  -     lisinopril (PRINIVIL;ZESTRIL) 10 MG tablet; Take 2 tablets by mouth daily, Disp-60 tablet, R-3Normal  2. Poorly controlled type 2 diabetes mellitus (HCC)  -     Venecia Irene DPM, Podiatry, [place]  3. Obesity, diabetes, and hypertension syndrome (HCC)  -     lisinopril (PRINIVIL;ZESTRIL) 10 MG tablet; Take 2 tablets by mouth daily, Disp-60 tablet, R-3Normal  4. Diabetes mellitus with coincident hypertension (HCC)  -     lisinopril (PRINIVIL;ZESTRIL) 10 MG tablet; Take 2 tablets by mouth daily, Disp-60 tablet, R-3Normal  -     Venecia Irene DPM, Podiatry, [place]        Problem List        Unprioritized    Poorly controlled type 2 diabetes mellitus ([facility]    Relevant Medications    insulin lispro, 1 Unit Dial, 100 UNIT/ML SOPN    metFORMIN (GLUCOPHAGE-XR) 500 MG extended release tablet    Other Relevant Orders    Ulises Irene DPM, Podiatry, [place]    Diabetes mellitus with coincident hypertension ([facility]    Relevant Medications    insulin lispro, 1 Unit Dial, 100 UNIT/ML SOPN    metFORMIN (GLUCOPHAGE-XR) 500 MG extended release tablet    lisinopril (PRINIVIL;ZESTRIL) 10 MG tablet    Other Relevant Orders    Frances Irene DPM, Podiatry, [place]    Obesity, diabetes, and hypertension syndrome ([facility])    Relevant Medications    insulin lispro, 1 Unit Dial, 100 UNIT/ML SOPN    metFORMIN (GLUCOPHAGE-XR) 500 MG extended release tablet    lisinopril (PRINIVIL;ZESTRIL) 10 MG tablet        Orders Placed This Encounter   Procedures    Venecia Irene DPM, Podiatry, [place]     Referral Priority:   Routine     Referral Type:   Eval and Treat     Referral Reason:   Specialty Services Required     Referred to Provider:   Ramón Garnett DPM     Requested Specialty:   Podiatry     Number of Visits Requested:   1       Return in about 2 months (around 1/19/2022) for follow up, will recheck A1c .         [facility] - Internal Medicine and Pediatrics  Dr. Jackelyn Jones D.O.  - Family Medicine and

## 2021-11-19 NOTE — PROGRESS NOTES
DIAGNOSIS:   1- Left hand flexor tenosynovitis incision and drainage. 2- Left long finger stiffness, new. DATE OF SURGERY:  9/27/2021. HISTORY OF PRESENT ILLNESS:  Mr. Higinio Persaud 46 y.o. RHD  male who came in today for f/u. The patient denies any significant pain in the left long finger. He has been doing much better. He has been working on range of motion on his own. He still feels stiff. No numbness or tingling sensation. No fever or Chills. He grew up Enterobacter cloacae complex, BHS Group B (Strep agalacticae), Beta Strep Group G, and Peptoniphilus asaccharolyticus,  on completed PO Zyvox, Cipro and Floranex. Past Medical History:   Diagnosis Date    Abscess of hand, left     Cellulitis of left hand 9/27/2021    Diabetes education, encounter for     [facility] ([facility]/27/[facility]21    Infection due to Enterobacter cloacae     Suppurative tenosynovitis of flexor tendon of left hand        Past Surgical History:   Procedure Laterality Date    FINGER SURGERY Left 09/27/2021    I&D done on left 3rd finger    HAND SURGERY Left 9/27/2021    LEFT HAND INCISION AND DRAINAGE MIDDLE FINGER performed by Keyona Chamorro MD at [address] [place] [address] History     Socioeconomic History    Marital status: Single     Spouse name: Not on file    Number of children: Not on file    Years of education: Not on file    Highest education level: Not on file   Occupational History    Not on file   Tobacco Use    Smoking status: Former Smoker    Smokeless tobacco: Current User     Types: Snuff   Vaping Use    Vaping Use: Never used   Substance and Sexual Activity    Alcohol use:  Yes     Alcohol/week: 4.0 standard drinks     Types: 4 Cans of beer per week    Drug use: Never    Sexual activity: Not on file   Other Topics Concern    Not on file   Social History Narrative    Not on file     Social Determinants of Health     Financial Resource Strain:     Difficulty of Paying Living Expenses: Not on file   Food Insecurity:     Worried About [address] in the Last Year: Not on file    Shelia of Food in the Last Year: Not on file   Transportation Needs:     Lack of Transportation (Medical): Not on file    Lack of Transportation (Non-Medical):  Not on file   Physical Activity:     Days of Exercise per Week: Not on file    Minutes of Exercise per Session: Not on file   Stress:     Feeling of Stress : Not on file   Social Connections:     Frequency of Communication with Friends and Family: Not on file    Frequency of Social Gatherings with Friends and Family: Not on file    Attends [employer] Services: Not on file    Active Member of Clubs or Organizations: Not on file    Attends Club or Organization Meetings: Not on file    Marital Status: Not on file   Intimate Partner Violence:     Fear of Current or Ex-Partner: Not on file    Emotionally Abused: Not on file    Physically Abused: Not on file    Sexually Abused: Not on file   Housing Stability:     Unable to Pay for Housing in the Last Year: Not on file    Number of [place] in the Last Year: Not on file    Unstable Housing in the Last Year: Not on file       Family History   Problem Relation Age of Onset    No Known Problems Mother     No Known Problems Father        Current Outpatient Medications on File Prior to Visit   Medication Sig Dispense Refill    metFORMIN (GLUCOPHAGE-XR) 500 MG extended release tablet Take 2 tablets by mouth daily (with breakfast) 60 tablet 0    insulin glargine (LANTUS;BASAGLAR) 100 UNIT/ML injection pen Inject 20 Units into the skin nightly 5 pen 3    lisinopril (PRINIVIL;ZESTRIL) 10 MG tablet Take 1 tablet by mouth daily 30 tablet 3    insulin lispro, 1 Unit Dial, 100 UNIT/ML SOPN Inject 7 Units into the skin 3 times daily (with meals) 6.3 mL 1    Insulin Pen Needle 32G X 4 MM MISC 1 each by Does not apply route 4 times daily (before meals and nightly) 100 each 3    Alcohol Swabs (ALCOHOL PREP) 70 % PADS 1 each by Does not apply route 4 times daily (before meals and nightly) 100 each 3    blood glucose monitor kit and supplies Dispense sufficient amount for indicated testing frequency plus additional to accommodate PRN testing needs. Dispense all needed supplies to include: monitor, strips, lancing device, lancets, control solutions, alcohol swabs. 1 kit 0    blood glucose monitor strips Test 3 times a day & as needed for symptoms of irregular blood glucose. Dispense sufficient amount for indicated testing frequency plus additional to accommodate PRN testing needs. 100 strip 3    Lancets MISC 1 each by Does not apply route 3 times daily 200 each 3     No current facility-administered medications on file prior to visit. Pertinent items are noted in HPI  Review of systems reviewed from Patient History Form dated on 10/8/2021 and available in the patient's chart under the Media tab. No change noted. PHYSICAL EXAMINATION:  Mr. Vega is a very pleasant 46 y.o.  male who presents today in no acute distress, awake, alert, and oriented. He is well dressed, nourished and  groomed. Patient with normal affect. Height is  5' 7\" (1.702 m), weight is 245 lb (111.1 kg), Body mass index is 38.37 kg/m². Resting respiratory rate is 16. The patient walks with no limp. The incision healing well left hand long finger. No signs of any erythema or drainage, mild swelling. He has minimal pain with the active or passive range of motion of the left long finger, decrease ROM with stiffness in the middle finger DIP joint. There is a new nail growing. He has intact sensation distally, and he is neurovascularly intact. Good strength, and no instability both upper and lower extremities. He has a strong hand grasp. IMPRESSION:    1- Left hand flexor tenosynovitis incision and drainage.   2- Left long finger stiffness      PLAN: He can go back to normal activity as tolerated. He was instructed to work on aggressive ROM left hand. He would like to do it on his own. The patient will come back for a follow up in 6 weeks. The patient understands that there is a chance of abscess recurrence even after surgical I&D.        Jenn Clinton MD

## 2021-12-06 DIAGNOSIS — E11.69 OBESITY, DIABETES, AND HYPERTENSION SYNDROME (HCC): ICD-10-CM

## 2021-12-06 DIAGNOSIS — E11.9 DIABETES MELLITUS WITH COINCIDENT HYPERTENSION (HCC): ICD-10-CM

## 2021-12-06 DIAGNOSIS — E66.9 OBESITY, DIABETES, AND HYPERTENSION SYNDROME (HCC): ICD-10-CM

## 2021-12-06 DIAGNOSIS — I15.2 OBESITY, DIABETES, AND HYPERTENSION SYNDROME (HCC): ICD-10-CM

## 2021-12-06 DIAGNOSIS — I10 DIABETES MELLITUS WITH COINCIDENT HYPERTENSION (HCC): ICD-10-CM

## 2021-12-06 DIAGNOSIS — E11.59 OBESITY, DIABETES, AND HYPERTENSION SYNDROME (HCC): ICD-10-CM

## 2021-12-06 DIAGNOSIS — E11.65 POORLY CONTROLLED TYPE 2 DIABETES MELLITUS (HCC): ICD-10-CM

## 2021-12-06 RX ORDER — METFORMIN HYDROCHLORIDE 500 MG/1
1000 TABLET, EXTENDED RELEASE ORAL
Qty: 60 TABLET | Refills: 3 | Status: SHIPPED | OUTPATIENT
Start: 2021-12-06 | End: 2022-04-12

## 2021-12-06 NOTE — TELEPHONE ENCOUNTER
Requested Prescriptions     Pending Prescriptions Disp Refills    metFORMIN (GLUCOPHAGE-XR) 500 MG extended release tablet 60 tablet 0     Sig: Take 2 tablets by mouth daily (with breakfast)       Patient requesting a medication refill.   Last filled on: 11.08.2021  [facility]  Next office visit: 1/19/2022  Last regular office visit: 11/19/2021

## 2021-12-22 ENCOUNTER — OFFICE VISIT (OUTPATIENT)
Dept: ORTHOPEDIC SURGERY | Age: 51
End: 2021-12-22
Payer: COMMERCIAL

## 2021-12-22 VITALS — HEIGHT: 67 IN | BODY MASS INDEX: 38.45 KG/M2 | WEIGHT: 245 LBS

## 2021-12-22 DIAGNOSIS — M25.642 FINGER STIFFNESS, LEFT: Primary | ICD-10-CM

## 2021-12-22 DIAGNOSIS — M65.9 FLEXOR TENOSYNOVITIS OF FINGER: ICD-10-CM

## 2021-12-22 PROCEDURE — 99213 OFFICE O/P EST LOW 20 MIN: CPT | Performed by: ORTHOPAEDIC SURGERY

## 2021-12-22 NOTE — PROGRESS NOTES
DIAGNOSIS:   1- Left hand flexor tenosynovitis incision and drainage. 2- Left long finger stiffness, new. DATE OF SURGERY:  9/27/2021. HISTORY OF PRESENT ILLNESS:  Mr. Vega 46 y.o. RHD  male who came in today for f/u. The patient denies any significant pain in the left long finger. He has been doing much better. He has been working on range of motion on his own. He still feels stiff. No numbness or tingling sensation. No fever or Chills. He grew up Enterobacter cloacae complex, BHS Group B (Strep agalacticae), Beta Strep Group G, and Peptoniphilus asaccharolyticus, completed PO Zyvox, Cipro and Floranex. Past Medical History:   Diagnosis Date    Abscess of hand, left     Cellulitis of left hand 9/27/2021    Diabetes education, encounter for     [facility]    Infection due to Enterobacter cloacae     Suppurative tenosynovitis of flexor tendon of left hand        Past Surgical History:   Procedure Laterality Date    FINGER SURGERY Left 09/27/2021    I&D done on left 3rd finger    HAND SURGERY Left 9/27/2021    LEFT HAND INCISION AND DRAINAGE MIDDLE FINGER performed by Jemal Coffman MD at [address] [place] [address] History     Socioeconomic History    Marital status: Single     Spouse name: Not on file    Number of children: Not on file    Years of education: Not on file    Highest education level: Not on file   Occupational History    Not on file   Tobacco Use    Smoking status: Former Smoker    Smokeless tobacco: Current User     Types: Snuff   Vaping Use    Vaping Use: Never used   Substance and Sexual Activity    Alcohol use:  Yes     Alcohol/week: 4.0 standard drinks     Types: 4 Cans of beer per week    Drug use: Never    Sexual activity: Not on file   Other Topics Concern    Not on file   Social History Narrative    Not on file     Social Determinants of Health     Financial Resource Strain:     Difficulty of Paying additional to accommodate PRN testing needs. Dispense all needed supplies to include: monitor, strips, lancing device, lancets, control solutions, alcohol swabs. 1 kit 0    blood glucose monitor strips Test 3 times a day & as needed for symptoms of irregular blood glucose. Dispense sufficient amount for indicated testing frequency plus additional to accommodate PRN testing needs. 100 strip 3    Lancets MISC 1 each by Does not apply route 3 times daily 200 each 3    insulin glargine (LANTUS;BASAGLAR) 100 UNIT/ML injection pen Inject 20 Units into the skin nightly 5 pen 3    insulin lispro, 1 Unit Dial, 100 UNIT/ML SOPN Inject 7 Units into the skin 3 times daily (with meals) 6.3 mL 1     No current facility-administered medications on file prior to visit. Pertinent items are noted in HPI  Review of systems reviewed from Patient History Form dated on 10/8/2021 and available in the patient's chart under the Media tab. No change noted. PHYSICAL EXAMINATION:  Mr. Higinio Persaud is a very pleasant 46 y.o.  male who presents today in no acute distress, awake, alert, and oriented. He is well dressed, nourished and  groomed. Patient with normal affect. Height is  5' 7\" (1.702 m), weight is 245 lb (111.1 kg), Body mass index is 38.37 kg/m². Resting respiratory rate is 16. The patient walks with no limp. The incision healing well left hand long finger. No signs of any erythema or drainage, mild swelling. He has minimal pain with the active or passive range of motion of the left long finger, decrease ROM with stiffness in the middle finger DIP joint. There is a new nail growing. He has intact sensation distally, and he is neurovascularly intact. Good strength, and no instability both upper and lower extremities. He has a strong hand grasp. IMPRESSION:    1- Left hand flexor tenosynovitis incision and drainage.   2- Left long finger stiffness      PLAN: He can go back to normal activity as tolerated. He was instructed to work on aggressive ROM left hand. He would like to do it on his own. The patient will come back for a follow up in 4 weeks. The patient understands that there is a chance of abscess recurrence even after surgical I&D.        Juan Mancilla MD

## 2022-03-07 DIAGNOSIS — E11.9 DIABETES MELLITUS WITH COINCIDENT HYPERTENSION (HCC): ICD-10-CM

## 2022-03-07 DIAGNOSIS — I10 ESSENTIAL HYPERTENSION: ICD-10-CM

## 2022-03-07 DIAGNOSIS — E11.69 OBESITY, DIABETES, AND HYPERTENSION SYNDROME (HCC): ICD-10-CM

## 2022-03-07 DIAGNOSIS — E11.59 OBESITY, DIABETES, AND HYPERTENSION SYNDROME (HCC): ICD-10-CM

## 2022-03-07 DIAGNOSIS — E66.9 OBESITY, DIABETES, AND HYPERTENSION SYNDROME (HCC): ICD-10-CM

## 2022-03-07 DIAGNOSIS — I15.2 OBESITY, DIABETES, AND HYPERTENSION SYNDROME (HCC): ICD-10-CM

## 2022-03-07 DIAGNOSIS — I10 DIABETES MELLITUS WITH COINCIDENT HYPERTENSION (HCC): ICD-10-CM

## 2022-03-07 RX ORDER — INSULIN LISPRO 100 [IU]/ML
7 INJECTION, SOLUTION INTRAVENOUS; SUBCUTANEOUS
Qty: 6.3 ML | Refills: 1 | Status: SHIPPED | OUTPATIENT
Start: 2022-03-07 | End: 2022-04-29

## 2022-03-07 RX ORDER — LISINOPRIL 10 MG/1
20 TABLET ORAL DAILY
Qty: 60 TABLET | Refills: 3 | Status: SHIPPED | OUTPATIENT
Start: 2022-03-07 | End: 2022-04-29

## 2022-03-07 NOTE — TELEPHONE ENCOUNTER
Requested Prescriptions     Pending Prescriptions Disp Refills    Insulin Pen Needle 32G X 4 MM MISC 100 each 3     Si each by Does not apply route 4 times daily (before meals and nightly)       Patient requesting a medication refill.   Last filled on:   [facility]  Next office visit: 3/17/2022  Last regular office visit: 2021

## 2022-03-07 NOTE — TELEPHONE ENCOUNTER
Requested Prescriptions     Pending Prescriptions Disp Refills    insulin lispro, 1 Unit Dial, 100 UNIT/ML SOPN 6.3 mL 1     Sig: Inject 7 Units into the skin 3 times daily (with meals)    lisinopril (PRINIVIL;ZESTRIL) 10 MG tablet 60 tablet 3     Sig: Take 2 tablets by mouth daily     Patient requesting a medication refill.     Next office visit: 3/17/2022    Last regular office visit: 11/19/2021

## 2022-04-09 DIAGNOSIS — E11.65 POORLY CONTROLLED TYPE 2 DIABETES MELLITUS (HCC): ICD-10-CM

## 2022-04-09 DIAGNOSIS — I15.2 OBESITY, DIABETES, AND HYPERTENSION SYNDROME (HCC): ICD-10-CM

## 2022-04-09 DIAGNOSIS — E66.9 OBESITY, DIABETES, AND HYPERTENSION SYNDROME (HCC): ICD-10-CM

## 2022-04-09 DIAGNOSIS — E11.9 DIABETES MELLITUS WITH COINCIDENT HYPERTENSION (HCC): ICD-10-CM

## 2022-04-09 DIAGNOSIS — E11.69 OBESITY, DIABETES, AND HYPERTENSION SYNDROME (HCC): ICD-10-CM

## 2022-04-09 DIAGNOSIS — E11.59 OBESITY, DIABETES, AND HYPERTENSION SYNDROME (HCC): ICD-10-CM

## 2022-04-09 DIAGNOSIS — I10 DIABETES MELLITUS WITH COINCIDENT HYPERTENSION (HCC): ICD-10-CM

## 2022-04-11 DIAGNOSIS — E11.65 POORLY CONTROLLED TYPE 2 DIABETES MELLITUS (HCC): ICD-10-CM

## 2022-04-11 DIAGNOSIS — I15.2 OBESITY, DIABETES, AND HYPERTENSION SYNDROME (HCC): ICD-10-CM

## 2022-04-11 DIAGNOSIS — I10 DIABETES MELLITUS WITH COINCIDENT HYPERTENSION (HCC): ICD-10-CM

## 2022-04-11 DIAGNOSIS — E11.69 OBESITY, DIABETES, AND HYPERTENSION SYNDROME (HCC): ICD-10-CM

## 2022-04-11 DIAGNOSIS — E66.9 OBESITY, DIABETES, AND HYPERTENSION SYNDROME (HCC): ICD-10-CM

## 2022-04-11 DIAGNOSIS — E11.59 OBESITY, DIABETES, AND HYPERTENSION SYNDROME (HCC): ICD-10-CM

## 2022-04-11 DIAGNOSIS — E11.9 DIABETES MELLITUS WITH COINCIDENT HYPERTENSION (HCC): ICD-10-CM

## 2022-04-11 RX ORDER — METFORMIN HYDROCHLORIDE 500 MG/1
TABLET, EXTENDED RELEASE ORAL
Qty: 60 TABLET | Refills: 3 | OUTPATIENT
Start: 2022-04-11

## 2022-04-11 NOTE — TELEPHONE ENCOUNTER
Requested Prescriptions     Pending Prescriptions Disp Refills    metFORMIN (GLUCOPHAGE-XR) 500 MG extended release tablet 60 tablet 3     Sig: Take 2 tablets by mouth daily (with breakfast)       Patient requesting a medication refill.   Last filled on:   [facility]  Next office visit: Visit date not found  Last regular office visit: 11/19/2021     Accidentally denied last request.

## 2022-04-12 DIAGNOSIS — E11.59 OBESITY, DIABETES, AND HYPERTENSION SYNDROME (HCC): ICD-10-CM

## 2022-04-12 DIAGNOSIS — E11.9 DIABETES MELLITUS WITH COINCIDENT HYPERTENSION (HCC): ICD-10-CM

## 2022-04-12 DIAGNOSIS — E66.9 OBESITY, DIABETES, AND HYPERTENSION SYNDROME (HCC): ICD-10-CM

## 2022-04-12 DIAGNOSIS — I10 DIABETES MELLITUS WITH COINCIDENT HYPERTENSION (HCC): ICD-10-CM

## 2022-04-12 DIAGNOSIS — E11.65 POORLY CONTROLLED TYPE 2 DIABETES MELLITUS (HCC): ICD-10-CM

## 2022-04-12 DIAGNOSIS — E11.69 OBESITY, DIABETES, AND HYPERTENSION SYNDROME (HCC): ICD-10-CM

## 2022-04-12 DIAGNOSIS — I15.2 OBESITY, DIABETES, AND HYPERTENSION SYNDROME (HCC): ICD-10-CM

## 2022-04-12 RX ORDER — METFORMIN HYDROCHLORIDE 500 MG/1
1000 TABLET, EXTENDED RELEASE ORAL
Qty: 60 TABLET | Refills: 3 | OUTPATIENT
Start: 2022-04-12

## 2022-04-12 NOTE — TELEPHONE ENCOUNTER
Requested Prescriptions     Pending Prescriptions Disp Refills    metFORMIN (GLUCOPHAGE-XR) 500 MG extended release tablet [Pharmacy Med Name: METFORMIN HCL  MG TABLET] 60 tablet 3     Sig: TAKE TWO TABLETS BY MOUTH DAILY WITH BREAKFAST   Patient requesting a medication refill.   Pharmacy: [facility]  Next office visit: 4/29/2022  Last regular office visit: 11/19/2021

## 2022-04-13 RX ORDER — METFORMIN HYDROCHLORIDE 500 MG/1
TABLET, EXTENDED RELEASE ORAL
Qty: 60 TABLET | Refills: 0 | Status: SHIPPED | OUTPATIENT
Start: 2022-04-13 | End: 2022-04-29 | Stop reason: SDUPTHER

## 2022-04-29 ENCOUNTER — OFFICE VISIT (OUTPATIENT)
Dept: INTERNAL MEDICINE CLINIC | Age: 52
End: 2022-04-29
Payer: COMMERCIAL

## 2022-04-29 VITALS
WEIGHT: 270 LBS | DIASTOLIC BLOOD PRESSURE: 84 MMHG | BODY MASS INDEX: 42.29 KG/M2 | HEART RATE: 104 BPM | OXYGEN SATURATION: 98 % | SYSTOLIC BLOOD PRESSURE: 138 MMHG

## 2022-04-29 DIAGNOSIS — E78.5 TYPE 2 DIABETES MELLITUS WITH HYPERLIPIDEMIA (HCC): ICD-10-CM

## 2022-04-29 DIAGNOSIS — I15.2 OBESITY, DIABETES, AND HYPERTENSION SYNDROME (HCC): ICD-10-CM

## 2022-04-29 DIAGNOSIS — E11.9 DIABETES MELLITUS WITH COINCIDENT HYPERTENSION (HCC): Primary | ICD-10-CM

## 2022-04-29 DIAGNOSIS — E11.69 OBESITY, DIABETES, AND HYPERTENSION SYNDROME (HCC): ICD-10-CM

## 2022-04-29 DIAGNOSIS — E66.9 OBESITY, DIABETES, AND HYPERTENSION SYNDROME (HCC): ICD-10-CM

## 2022-04-29 DIAGNOSIS — E11.69 TYPE 2 DIABETES MELLITUS WITH HYPERLIPIDEMIA (HCC): ICD-10-CM

## 2022-04-29 DIAGNOSIS — E11.59 OBESITY, DIABETES, AND HYPERTENSION SYNDROME (HCC): ICD-10-CM

## 2022-04-29 DIAGNOSIS — I10 DIABETES MELLITUS WITH COINCIDENT HYPERTENSION (HCC): Primary | ICD-10-CM

## 2022-04-29 LAB — HBA1C MFR BLD: 4.1 %

## 2022-04-29 PROCEDURE — 3044F HG A1C LEVEL LT 7.0%: CPT | Performed by: FAMILY MEDICINE

## 2022-04-29 PROCEDURE — 99214 OFFICE O/P EST MOD 30 MIN: CPT | Performed by: FAMILY MEDICINE

## 2022-04-29 PROCEDURE — 83036 HEMOGLOBIN GLYCOSYLATED A1C: CPT | Performed by: FAMILY MEDICINE

## 2022-04-29 RX ORDER — METFORMIN HYDROCHLORIDE 500 MG/1
TABLET, EXTENDED RELEASE ORAL
Qty: 90 TABLET | Refills: 1 | Status: SHIPPED | OUTPATIENT
Start: 2022-04-29 | End: 2022-05-25 | Stop reason: SDUPTHER

## 2022-04-29 RX ORDER — LISINOPRIL 20 MG/1
20 TABLET ORAL DAILY
Qty: 90 TABLET | Refills: 1 | Status: SHIPPED | OUTPATIENT
Start: 2022-04-29 | End: 2022-06-20

## 2022-04-29 RX ORDER — LISINOPRIL 20 MG/1
TABLET ORAL
COMMUNITY
Start: 2022-04-25 | End: 2022-04-29 | Stop reason: SDUPTHER

## 2022-04-29 RX ORDER — ATORVASTATIN CALCIUM 40 MG/1
40 TABLET, FILM COATED ORAL NIGHTLY
Qty: 90 TABLET | Refills: 3 | Status: SHIPPED | OUTPATIENT
Start: 2022-04-29 | End: 2022-07-28

## 2022-04-29 ASSESSMENT — ENCOUNTER SYMPTOMS
SHORTNESS OF BREATH: 0
COUGH: 0
NAUSEA: 0
VOMITING: 0
CONSTIPATION: 0
DIARRHEA: 0

## 2022-04-29 NOTE — PROGRESS NOTES
Aranza Gonzalez (:  1970) is a 46 y.o. male,Established patient, here for evaluation of the following chief complaint(s):  Follow-up    SUBJECTIVE:  22 - follow up DM. Will get POC A1c today to eval DM. A1c today is 4.1. Congratulated patient on excellent progress. Will discontinue insulin dosing, and allow patient only on metformin once daily with HYPERTENSION medications. Left hand is fully healed up. Back to work. No further issues with infections to the hand  Did not get his eye examination  Has not yet gotten into foot doctor. 22 - Has been back to work for 2 weeks, has done well. Does see Dr. Vibha Nice (ortho-hand) for his tenosynovitis. Saw ortho this am, doing well and is able to work without restrictions. BP is slightly elevated, will increase medicines to 20 mg daily lisinopril from 10 mg dosing daily before. Needs opthalmology and podiatry visits due to DM, he will schedule with eye doctor that he saw in the past.  Referred to podiatry. 10.22.21:  New patient  Poorly controlled diabetes, Last A1c was 11.4% on 21 when he was in the hospital. Had to have infection drained from left third finger. Still off of work, works as a day . States he never goes to the doctor. Had 'never been sick' but now was told he has diabetes. Did have his COVID vaccines, dates updated in computer. I have reviewed the chart notes available from myself and other providers (ER visit and labs from that ER and hospital visit). I have reviewed and addressed all active problems and created or updated the problems list in detail, as needed    I have extensively reviewed and reconciled the medication list, discontinued medications not taking or no longer appropriate, and updated the active meds list    OBJECTIVE:  Review of Systems   Constitutional: Negative for chills and fever. Respiratory: Negative for cough and shortness of breath. Cardiovascular: Negative for leg swelling. Gastrointestinal: Negative for constipation, diarrhea, nausea and vomiting. Endocrine: Negative for polyuria. Genitourinary: Negative for frequency. Skin: Negative for rash. Vitals:    04/29/22 1429   BP: 138/84   Pulse: 104   SpO2: 98%   Weight: 270 lb (122.5 kg)      Body mass index is 42.29 kg/m². Physical Exam  Constitutional:       Appearance: Normal appearance. Cardiovascular:      Rate and Rhythm: Normal rate and regular rhythm. Pulses: Normal pulses. Heart sounds: Normal heart sounds. Pulmonary:      Effort: Pulmonary effort is normal.      Breath sounds: Normal breath sounds. Musculoskeletal:      Right lower leg: No edema. Left lower leg: No edema. Neurological:      General: No focal deficit present. Mental Status: He is alert. Mental status is at baseline. Lab Results   Component Value Date    LABA1C 4.1 04/29/2022     Lab Results   Component Value Date    WBC 8.9 10/08/2021    HGB 14.6 10/08/2021    HCT 43.3 10/08/2021    MCV 90.3 10/08/2021     10/08/2021      Lab Results   Component Value Date     10/08/2021    K 4.7 10/08/2021     10/08/2021    CO2 22 10/08/2021    BUN 16 10/08/2021    CREATININE 0.8 10/08/2021    GLUCOSE 188 10/08/2021    CALCIUM 9.9 10/08/2021         Patient received counseling and, if relevant, printed instructions for all symptoms listed in CC and HPI, as well as for all diagnoses brought onto today's visit note below. Typical counseling includes, but is not limited to, non-pharmacologic measures to manage listed symptoms and conditions; appropriate use, risks and benefits for all prescribed medications; potential interactions between medications both prescribed and OTC; diet; exercise; healthy behaviors; and goalsetting to improve health. Patient or responsible party was involved in shared decision making and had opportunity to have all questions answered.   Except as noted below, all chronic problems have been reviewed and are stable to continue medications or other therapy as previously documented in the patient's chart, with changes per orders or documentation below:    1. Diabetes mellitus with coinc[facility]  -     POCT glycosylated hemoglobin (Hb A1C)  -     Amb External Referral To Podiatry  -     metFORMIN (GLUCOPHAGE-XR) 500 MG extended release tablet; Take one tablet by mouth daily, Disp-90 tablet, R-1Normal  -     lisinopril (PRINIVIL;ZESTRIL) 20 MG tablet; Take 1 tablet by mouth daily, Disp-90 tablet, R-1Normal  2. Obesity, diabetes, and hypertension syndrome (HCC)  -     POCT glycosylated hemoglobin (Hb A1C)  -     Amb External Referral To Podiatry  -     metFORMIN (GLUCOPHAGE-XR) 500 MG extended release tablet; Take one tablet by mouth daily, Disp-90 tablet, R-1Normal  -     lisinopril (PRINIVIL;ZESTRIL) 20 MG tablet; Take 1 tablet by mouth daily, Disp-90 tablet, R-1Normal  3. Type 2 diabetes mellitus with hyperlipidemia (HCC)  -     atorvastatin (LIPITOR) 40 MG tablet;  Take 1 tablet by mouth nightly, Disp-90 tablet, R-3Normal        Problem List        Unprioritized    Diabetes mellitus with coincident hypertension ([facility] - Primary    Relevant Medications    metFORMIN (GLUCOPHAGE-XR) 500 MG extended release tablet    lisinopril (PRINIVIL;ZESTRIL) 20 MG tablet    Other Relevant Orders    POCT glycosylated hemoglobin (Hb A1C) (Completed)    Amb External Referral To Podiatry    Obesity, diabetes, and hypertension syndrome (HCC)    Relevant Medications    metFORMIN (GLUCOPHAGE-XR) 500 MG extended release tablet    lisinopril (PRINIVIL;ZESTRIL) 20 MG tablet    Other Relevant Orders    POCT glycosylated hemoglobin (Hb A1C) (Completed)    Amb External Referral To Podiatry        Orders Placed This Encounter   Procedures    Amb External Referral To Podiatry     Referral Priority:   Routine     Referral Reason:   Specialty Services Required     Referred to Provider:   Ewelina Tony DPM Requested Specialty:   Podiatry     Number of Visits Requested:   1    POCT glycosylated hemoglobin (Hb A1C)       Return in about 6 months (around 10/29/2022). [facility] - Internal Medicine and Pediatrics  Dr. Yosi Nguyen D.O.  - Family Medicine and T      Usual doctor's hours are:     Monday 7:30 am to 6:00 pm           Tuesday  7:30 am to 5:00 pm                                               Wednesday 7:30 am to 5:00 pm                                               Thursday 7:30 am to 5:00 pm                                               Friday 7:30 am to 4:00 pm           Saturdays, Sundays, and after hours: E-Visits are available    We observe most federal holidays and Good Friday. We ask that you only contact the office one time per issue or question, and please allow one full business day for a call back. Calling us back multiple times keeps us from being able to complete the work efficiently for you and our other patients. For medication renewals, please call your pharmacist to contact us, and be sure to allow at least 3 business days for processing before you need to  your medication. If you are sick or need an appointment that hasn't been planned, same day appointments are available every day the office is open: Monday, Tuesday, Wednesday, Thursday, and Friday. Call during office hours to schedule, even if it may not be with your regular physician. You may also call the office after 8 am on office days if you need to be seen from an issue the night before. During hours when the office is not normally open, your call will go to the messaging service which cannot provide any service other than paging the doctor. No prescriptions or other nonurgent matters will be handled and no voicemail is available, so please call back during office hours for these matters.        Electronically signed by Yosi Nguyen DO on 4/29/2022 at 2:51 PM.

## 2022-05-25 DIAGNOSIS — E66.9 OBESITY, DIABETES, AND HYPERTENSION SYNDROME (HCC): ICD-10-CM

## 2022-05-25 DIAGNOSIS — E11.59 OBESITY, DIABETES, AND HYPERTENSION SYNDROME (HCC): ICD-10-CM

## 2022-05-25 DIAGNOSIS — I10 DIABETES MELLITUS WITH COINCIDENT HYPERTENSION (HCC): ICD-10-CM

## 2022-05-25 DIAGNOSIS — I15.2 OBESITY, DIABETES, AND HYPERTENSION SYNDROME (HCC): ICD-10-CM

## 2022-05-25 DIAGNOSIS — E11.9 DIABETES MELLITUS WITH COINCIDENT HYPERTENSION (HCC): ICD-10-CM

## 2022-05-25 DIAGNOSIS — E11.69 OBESITY, DIABETES, AND HYPERTENSION SYNDROME (HCC): ICD-10-CM

## 2022-05-25 NOTE — TELEPHONE ENCOUNTER
Requested Prescriptions     Pending Prescriptions Disp Refills    metFORMIN (GLUCOPHAGE-XR) 500 mg extended release tablet 90 tablet 1     Sig: Take one tablet by mouth daily       Patient requesting a medication refill.   Last filled on:   [facility]  Next office visit: 10/28/2022  Last regular office visit: 4/29/2022

## 2022-05-26 RX ORDER — METFORMIN HYDROCHLORIDE 500 MG/1
TABLET, EXTENDED RELEASE ORAL
Qty: 90 TABLET | Refills: 1 | Status: SHIPPED | OUTPATIENT
Start: 2022-05-26 | End: 2022-06-20

## 2022-06-20 ENCOUNTER — OFFICE VISIT (OUTPATIENT)
Dept: INTERNAL MEDICINE CLINIC | Age: 52
End: 2022-06-20
Payer: COMMERCIAL

## 2022-06-20 VITALS
BODY MASS INDEX: 41.59 KG/M2 | HEIGHT: 67 IN | DIASTOLIC BLOOD PRESSURE: 83 MMHG | HEART RATE: 114 BPM | OXYGEN SATURATION: 97 % | WEIGHT: 265 LBS | SYSTOLIC BLOOD PRESSURE: 137 MMHG

## 2022-06-20 DIAGNOSIS — E11.59 OBESITY, DIABETES, AND HYPERTENSION SYNDROME (HCC): ICD-10-CM

## 2022-06-20 DIAGNOSIS — E11.9 DIABETES MELLITUS WITH COINCIDENT HYPERTENSION (HCC): ICD-10-CM

## 2022-06-20 DIAGNOSIS — I10 DIABETES MELLITUS WITH COINCIDENT HYPERTENSION (HCC): ICD-10-CM

## 2022-06-20 DIAGNOSIS — E66.9 OBESITY, DIABETES, AND HYPERTENSION SYNDROME (HCC): ICD-10-CM

## 2022-06-20 DIAGNOSIS — E11.69 OBESITY, DIABETES, AND HYPERTENSION SYNDROME (HCC): ICD-10-CM

## 2022-06-20 DIAGNOSIS — I15.2 OBESITY, DIABETES, AND HYPERTENSION SYNDROME (HCC): ICD-10-CM

## 2022-06-20 PROBLEM — M25.642 FINGER STIFFNESS, LEFT: Status: RESOLVED | Noted: 2021-10-23 | Resolved: 2022-06-20

## 2022-06-20 PROCEDURE — 3044F HG A1C LEVEL LT 7.0%: CPT | Performed by: INTERNAL MEDICINE

## 2022-06-20 PROCEDURE — 99214 OFFICE O/P EST MOD 30 MIN: CPT | Performed by: INTERNAL MEDICINE

## 2022-06-20 RX ORDER — METFORMIN HYDROCHLORIDE 500 MG/1
2000 TABLET, EXTENDED RELEASE ORAL
Qty: 90 TABLET | Refills: 0 | Status: SHIPPED | OUTPATIENT
Start: 2022-06-20 | End: 2022-08-05

## 2022-06-20 RX ORDER — LISINOPRIL AND HYDROCHLOROTHIAZIDE 20; 12.5 MG/1; MG/1
2 TABLET ORAL DAILY
Qty: 180 TABLET | Refills: 0 | Status: SHIPPED | OUTPATIENT
Start: 2022-06-20

## 2022-06-20 ASSESSMENT — PATIENT HEALTH QUESTIONNAIRE - PHQ9
SUM OF ALL RESPONSES TO PHQ QUESTIONS 1-9: 0
SUM OF ALL RESPONSES TO PHQ9 QUESTIONS 1 & 2: 0
2. FEELING DOWN, DEPRESSED OR HOPELESS: 0
1. LITTLE INTEREST OR PLEASURE IN DOING THINGS: 0
SUM OF ALL RESPONSES TO PHQ QUESTIONS 1-9: 0

## 2022-06-20 NOTE — LETTER
[facility]  [facility] [place] [address]  Phone: [phone]  Fax: [phone]    Doris Nance MD        June 20, 2022     Patient: Barby Sequeira   YOB: 1970   Date of Visit: 6/20/2022       To Whom it May Concern:    Barby Sequeira was seen in my clinic on 6/20/2022. If you have any questions or concerns, please don't hesitate to call.     Sincerely,         Doris Nance MD

## 2022-06-20 NOTE — PROGRESS NOTES
Chief Complaint   Patient presents with    Diabetes     sugars running in the 200's       HPI:     Medications reviewed and reconciled with what patient reports to be taking. Bárbara Salazar is a 46year old male with a history of DM and HTN who presents with elevated blood sugar readings. Patient's A1c was 11.4% on 9/28/21 and he was started on metformin 1000 mg qd and insulin 4 times daily before meals and nightly. A1c was 4.1% on 4/29/22. Patient was advised to stop insulin and continue on metformin 500 mg qd. Patient reports that his glucose readings were in the low-mid 100s until a week ago, when they started running in the low 200s. While on insulin, he tried to keep carb intake to 60 g/meal. He reports that he tries to maintain a balanced diet containing protein and vegetables, but has been less strict with carb restriction. Denies any s/sx of infection and no other changes in how he is feeling. /83   Pulse (!) 114   Ht 5' 7\" (1.702 m)   Wt 265 lb (120.2 kg)   SpO2 97%   BMI 41.50 kg/m²     Physical Exam  Constitutional:       Appearance: Normal appearance. HENT:      Head: Normocephalic and atraumatic. Cardiovascular:      Rate and Rhythm: Normal rate and regular rhythm. Heart sounds: S1 normal and S2 normal.   Pulmonary:      Effort: Pulmonary effort is normal.      Breath sounds: Normal breath sounds. Abdominal:      General: Bowel sounds are normal.      Palpations: Abdomen is soft. There is no hepatomegaly or splenomegaly. Tenderness: There is no abdominal tenderness. Neurological:      Mental Status: He is alert. ASSESSMENT/PLAN: Pt received counseling and, if relevant, printed instructions for all symptoms listed in CC and HPI, as well as for all diagnoses listed below. 1. Diabetes mellitus with coincident hypertension (HCC)--pt now inadequately controlled.  Needs to get back on more regulated diet, and will increase metformin to 2000 mg daily; add [place] and [place] next if needed at time of followup. Replace lisinopril 20 mg with prinzide totalling 40 mg lisinopril and 25 mg hctz. - metFORMIN (GLUCOPHAGE-XR) 500 MG extended release tablet; Take 4 tablets by mouth daily (with breakfast) (may split dose if desired)  Dispense: 90 tablet; Refill: 0    2. Obesity, diabetes, and hypertension syndrome (HCC)  - metFORMIN (GLUCOPHAGE-XR) 500 MG extended release tablet; Take 4 tablets by mouth daily (with breakfast) (may split dose if desired)  Dispense: 90 tablet; Refill: 0. Problem List Items Addressed This Visit     Diabetes mellitus with coincident hypertension ([facility]     Pt reports sugars running higher in 200s for past week         Relevant Medications    metFORMIN (GLUCOPHAGE-XR) 500 MG extended release tablet    lisinopril-hydroCHLOROthiazide (PRINZIDE;ZESTORETIC) 20-12.5 MG per tablet    Obesity, diabetes, and hypertension syndrome (HCC)    Relevant Medications    metFORMIN (GLUCOPHAGE-XR) 500 MG extended release tablet            Return in about 1 month (around 7/20/2022) for f/u mult med extended.

## 2022-08-05 ENCOUNTER — OFFICE VISIT (OUTPATIENT)
Dept: INTERNAL MEDICINE CLINIC | Age: 52
End: 2022-08-05

## 2022-08-05 VITALS
OXYGEN SATURATION: 100 % | HEART RATE: 108 BPM | SYSTOLIC BLOOD PRESSURE: 145 MMHG | BODY MASS INDEX: 39.31 KG/M2 | DIASTOLIC BLOOD PRESSURE: 90 MMHG | WEIGHT: 251 LBS

## 2022-08-05 DIAGNOSIS — D50.0 IRON DEFICIENCY ANEMIA DUE TO CHRONIC BLOOD LOSS: ICD-10-CM

## 2022-08-05 DIAGNOSIS — E78.5 TYPE 2 DIABETES MELLITUS WITH HYPERLIPIDEMIA (HCC): ICD-10-CM

## 2022-08-05 DIAGNOSIS — E11.69 TYPE 2 DIABETES MELLITUS WITH HYPERLIPIDEMIA (HCC): ICD-10-CM

## 2022-08-05 DIAGNOSIS — I10 DIABETES MELLITUS WITH COINCIDENT HYPERTENSION (HCC): ICD-10-CM

## 2022-08-05 DIAGNOSIS — N17.9 ACUTE RENAL FAILURE, UNSPECIFIED ACUTE RENAL FAILURE TYPE (HCC): ICD-10-CM

## 2022-08-05 DIAGNOSIS — E11.9 DIABETES MELLITUS WITH COINCIDENT HYPERTENSION (HCC): ICD-10-CM

## 2022-08-05 DIAGNOSIS — N17.0 ACUTE TUBULAR NECROSIS (HCC): ICD-10-CM

## 2022-08-05 DIAGNOSIS — Z09 HOSPITAL DISCHARGE FOLLOW-UP: Primary | ICD-10-CM

## 2022-08-05 LAB
ALBUMIN SERPL-MCNC: 4.1 G/DL (ref 3.4–5)
ANION GAP SERPL CALCULATED.3IONS-SCNC: 15 MMOL/L (ref 3–16)
BASOPHILS ABSOLUTE: 0.1 K/UL (ref 0–0.2)
BASOPHILS RELATIVE PERCENT: 0.8 %
BUN BLDV-MCNC: 20 MG/DL (ref 7–20)
CALCIUM SERPL-MCNC: 9.2 MG/DL (ref 8.3–10.6)
CHLORIDE BLD-SCNC: 105 MMOL/L (ref 99–110)
CO2: 23 MMOL/L (ref 21–32)
CREAT SERPL-MCNC: 1 MG/DL (ref 0.9–1.3)
EOSINOPHILS ABSOLUTE: 0.2 K/UL (ref 0–0.6)
EOSINOPHILS RELATIVE PERCENT: 2.5 %
GFR AFRICAN AMERICAN: >60
GFR NON-AFRICAN AMERICAN: >60
GLUCOSE BLD-MCNC: 151 MG/DL (ref 70–99)
HCT VFR BLD CALC: 26.3 % (ref 40.5–52.5)
HEMOGLOBIN: 9.4 G/DL (ref 13.5–17.5)
LYMPHOCYTES ABSOLUTE: 1.9 K/UL (ref 1–5.1)
LYMPHOCYTES RELATIVE PERCENT: 24.2 %
MCH RBC QN AUTO: 31.5 PG (ref 26–34)
MCHC RBC AUTO-ENTMCNC: 35.9 G/DL (ref 31–36)
MCV RBC AUTO: 87.7 FL (ref 80–100)
MONOCYTES ABSOLUTE: 0.6 K/UL (ref 0–1.3)
MONOCYTES RELATIVE PERCENT: 8 %
NEUTROPHILS ABSOLUTE: 5.2 K/UL (ref 1.7–7.7)
NEUTROPHILS RELATIVE PERCENT: 64.5 %
PDW BLD-RTO: 13.2 % (ref 12.4–15.4)
PHOSPHORUS: 2 MG/DL (ref 2.5–4.9)
PLATELET # BLD: 284 K/UL (ref 135–450)
PMV BLD AUTO: 6.6 FL (ref 5–10.5)
POTASSIUM SERPL-SCNC: 4.5 MMOL/L (ref 3.5–5.1)
RBC # BLD: 3 M/UL (ref 4.2–5.9)
SODIUM BLD-SCNC: 143 MMOL/L (ref 136–145)
WBC # BLD: 8 K/UL (ref 4–11)

## 2022-08-05 PROCEDURE — 1111F DSCHRG MED/CURRENT MED MERGE: CPT | Performed by: INTERNAL MEDICINE

## 2022-08-05 PROCEDURE — 3052F HG A1C>EQUAL 8.0%<EQUAL 9.0%: CPT | Performed by: INTERNAL MEDICINE

## 2022-08-05 PROCEDURE — 99215 OFFICE O/P EST HI 40 MIN: CPT | Performed by: INTERNAL MEDICINE

## 2022-08-05 RX ORDER — ERGOCALCIFEROL (VITAMIN D2) 1250 MCG
50000 CAPSULE ORAL WEEKLY
COMMUNITY
Start: 2022-08-05 | End: 2022-08-12

## 2022-08-05 RX ORDER — TRAMADOL HYDROCHLORIDE 50 MG/1
TABLET ORAL
COMMUNITY
End: 2022-08-05

## 2022-08-05 RX ORDER — MECLIZINE HYDROCHLORIDE 25 MG/1
25 TABLET ORAL 2 TIMES DAILY PRN
COMMUNITY
Start: 2022-07-19 | End: 2022-08-05

## 2022-08-05 NOTE — PROGRESS NOTES
Chief Complaint   Patient presents with    Follow-up       HPI: Here for *** followup and management of multiple chronic conditions as per the active problems list on chart, which I reviewed and updated with the patient today. States doing well with no new concerns except if noted below. I have reviewed the chart notes available from myself and other providers. I have reviewed and addressed all active problems and created or updated the problems list in detail, as needed. No problem-specific Assessment & Plan notes found for this encounter.       I have extensively reviewed and reconciled the medication list, discontinued medications not taking or no longer appropriate, and updated the active meds list      Lab Results   Component Value Date    LABA1C 4.1 04/29/2022    LABA1C 11.4 09/28/2021       Lab Results   Component Value Date     10/08/2021     (L) 09/27/2021     05/29/2012    K 4.7 10/08/2021    K 3.9 09/27/2021    K 3.8 05/29/2012     10/08/2021    CL 98 (L) 09/27/2021     05/29/2012    CO2 22 10/08/2021    CO2 23 09/27/2021    CO2 30 05/29/2012    BUN 16 10/08/2021    BUN 16 09/27/2021    BUN 8 05/29/2012    CREATININE 0.8 (L) 10/08/2021    CREATININE 0.6 (L) 09/27/2021    CREATININE 0.8 (L) 05/29/2012    GLUCOSE 188 (H) 10/08/2021    GLUCOSE 281 (H) 09/27/2021    GLUCOSE 290 (H) 05/29/2012    CALCIUM 9.9 10/08/2021    CALCIUM 9.9 09/27/2021    CALCIUM 10.2 05/29/2012       Lab Results   Component Value Date    CHOL 136 10/08/2021    TRIG 58 10/08/2021    HDL 47 10/08/2021    LDLCALC 77 10/08/2021       Lab Results   Component Value Date    ALT 26 10/08/2021    ALT 13 09/27/2021    ALT 18 05/29/2012    AST 22 10/08/2021    AST 15 09/27/2021    AST 18 05/29/2012       No results found for: TSH, T4FREE, T3FREE    Lab Results   Component Value Date    WBC 8.9 10/08/2021    WBC 10.3 09/28/2021    WBC 8.2 09/27/2021    HGB 14.6 10/08/2021    HGB 13.1 (L) 09/28/2021    HGB 15.4 09/27/2021    HCT 43.3 10/08/2021    HCT 37.5 (L) 09/28/2021    HCT 47.7 09/27/2021    MCV 90.3 10/08/2021    MCV 91.2 09/28/2021    MCV 90.8 09/27/2021     10/08/2021     09/28/2021     09/27/2021       No results found for: INR     No results found for: PSA     No results found for: LABURIC          BP (!) 147/83   Pulse (!) 108   Wt 251 lb (113.9 kg)   SpO2 100%   BMI 39.31 kg/m²   Body mass index is 39.31 kg/m². Physical Exam    ASSESSMENT AND PLANS:      Except as noted below, all chronic problems have been reviewed and are stable to continue medications or other therapy as previously documented in the patient's chart, with changes per orders or documentation below:        Assessment and Plan: Patient received counseling and, if relevant,printed instructions for all symptoms listed in CC and HPI, as well as for all diagnoses brought onto today's visit note below. Typical counseling includes, but is not limited to, non pharmacologic measures to manage listed symptoms and conditions; appropriate use, risks and benefits for all prescribed medications; potential interactions between medications both prescribed and OTC; diet; exercise; healthy behaviors; and goalsetting to improve health. Pt.or responsible party was involved in shared decision making and had opportunity to have all questions answered. There are no diagnoses linked to this encounter. Problem List    None    No orders of the defined types were placed in this encounter. I have reconciled the medications in chart with what patient reports to be taking, andreviewed action/ sideeffects and how to take any new medications. Patient/caregiver understands purpose and side effects. A complete  list of medications was provided in their after-visit summary. No follow-ups on file.     Time basedbilling: I spent over *** minutes with this patient, and as is the nature of primary care and typical for my extended visits, over 50 percent of this visit was spent on counseling and coordination ofcare.

## 2022-08-05 NOTE — PROGRESS NOTES
Post-Discharge Transitional Care Management Services      Trisha Ball   YOB: 1970    Date of Visit:  8/5/2022  30 Day Post-Discharge Date: 7    Allergies   Allergen Reactions    Atorvastatin      nausea    Metformin And Related      Blood glucoses went up     Outpatient Medications Marked as Taking for the 8/5/22 encounter (Office Visit) with Pa Garcia MD   Medication Sig Dispense Refill    ergocalciferol (ERGOCALCIFEROL) 1.25 MG (60127 UT) capsule Take 50,000 Units by mouth once a week           Vitals:    08/05/22 1419 08/05/22 1513   BP: (!) 147/83 (!) 145/90   Pulse: (!) 108    SpO2: 100%    Weight: 251 lb (113.9 kg)      Body mass index is 39.31 kg/m². Wt Readings from Last 3 Encounters:   08/05/22 251 lb (113.9 kg)   06/20/22 265 lb (120.2 kg)   04/29/22 270 lb (122.5 kg)     BP Readings from Last 3 Encounters:   08/05/22 (!) 145/90   06/20/22 137/83   04/29/22 138/84        Patient was admitted to [facility] from 7/26 to 7/30/22 for dizziness attributed to dehydration with LD and ATN. Creatinine 13.36 on admission. After hydration, hemoglobin fell as low as 7.8 but this anemia was not addressed (baseline hgb normal). Pt denied reasons for dehydration and also denies any bleeding sites, melena and hematochezia. Pt has followup with nephrology Dr Brett Villeda. Inpatient course: Discharge summary reviewed- see chart. Current status: still feels dizzy, not much better than when he went to hospital    Review of Systems:  A comprehensive review of systems was negative except for what was noted in the HPI. Physical Exam:  General Appearance: alert and oriented to person, place and time, well developed and well- nourished, in no acute distress  Skin: warm and dry, no rash or erythema. Conjunctival pallid.   Head: normocephalic and atraumatic  Eyes: pupils equal, round, and reactive to light, extraocular eye movements intact  ENT: tympanic membrane, external ear and ear canal normal bilaterally, nose without deformity, nasal mucosa and turbinates normal without polyps  Neck: supple and non-tender without mass, no thyromegaly or thyroid nodules, no cervical lymphadenopathy  Pulmonary/Chest: clear to auscultation bilaterally- no wheezes, rales or rhonchi, normal air movement, no respiratory distress  Cardiovascular: tachycardic, regular rhythm, normal S1 and S2, no murmurs, rubs, clicks, or gallops, distal pulses intact, no carotid bruits  Abdomen: soft, non-tender, non-distended, normal bowel sounds, no masses or organomegaly  Extremities: no cyanosis, clubbing or edema  Musculoskeletal: normal range of motion, no joint swelling, deformity or tenderness  Neurologic: reflexes normal and symmetric, no cranial nerve deficit, gait, coordination and speech normal  Assessment/Plan:    Angela Acosta was seen today for follow-up. Diagnoses and all orders for this visit:    Hospital discharge follow-up    Acute renal failure, unspecified acute renal failure type [facility])  -     Renal Function Panel; Future  -     CBC with Auto Differential; Future    Acute tubular necrosis (HCC)    Iron deficiency anemia due to chronic blood loss--based on current exam appears to be main cause of dizziness. Needs GI eval. REchecking cbc today. Placed message to on call physician letting her know of situation as may receive critical lab message tonight. -     Nick Edwards MD, Gastroenterology (ERCP), [facility]    Diabetes mellitus with coincident hypertension ([facility]  -     Cancel: POCT glycosylated hemoglobin (Hb A1C)  -      DIABETES FOOT EXAM  -     Hemoglobin A1C; Future    Type 2 diabetes mellitus with hyperlipidemia (HCC)        Diagnostic test results reviewed:  all    Patient risk of morbidity and mortality: very high    I have spent over 45 minutes with this patient and/or guardian. This included time spent on reviewing records, counseling and care coordination.        Medical Decision Making: high complexity

## 2022-08-06 LAB
ESTIMATED AVERAGE GLUCOSE: 197.3 MG/DL
HBA1C MFR BLD: 8.5 %

## 2022-08-08 NOTE — RESULT ENCOUNTER NOTE
Labs were better than expected. Anemia improved but definitely needs to followup with GI (referred). Kidney function normalized but still low phosphorus to discuss with Dr Vipin Conway. Diabetes also improved.

## 2023-12-17 ENCOUNTER — HOSPITAL ENCOUNTER (INPATIENT)
Age: 53
LOS: 16 days | Discharge: NURSING, REHAB OR LONG-TERM CARE | DRG: 710 | End: 2024-01-02
Attending: EMERGENCY MEDICINE | Admitting: INTERNAL MEDICINE
Payer: MEDICAID

## 2023-12-17 ENCOUNTER — APPOINTMENT (OUTPATIENT)
Dept: GENERAL RADIOLOGY | Age: 53
DRG: 710 | End: 2023-12-17
Payer: MEDICAID

## 2023-12-17 ENCOUNTER — APPOINTMENT (OUTPATIENT)
Dept: CT IMAGING | Age: 53
DRG: 710 | End: 2023-12-17
Payer: MEDICAID

## 2023-12-17 DIAGNOSIS — R73.9 HYPERGLYCEMIA: ICD-10-CM

## 2023-12-17 DIAGNOSIS — D72.829 LEUKOCYTOSIS, UNSPECIFIED TYPE: ICD-10-CM

## 2023-12-17 DIAGNOSIS — Z71.89 GOALS OF CARE, COUNSELING/DISCUSSION: ICD-10-CM

## 2023-12-17 DIAGNOSIS — R79.82 ELEVATED C-REACTIVE PROTEIN (CRP): ICD-10-CM

## 2023-12-17 DIAGNOSIS — D75.839 THROMBOCYTOSIS: ICD-10-CM

## 2023-12-17 DIAGNOSIS — E87.1 HYPONATREMIA: ICD-10-CM

## 2023-12-17 DIAGNOSIS — L97.509 TYPE 2 DIABETES MELLITUS WITH FOOT ULCER, UNSPECIFIED WHETHER LONG TERM INSULIN USE (HCC): ICD-10-CM

## 2023-12-17 DIAGNOSIS — I96 GANGRENE OF RIGHT FOOT (HCC): ICD-10-CM

## 2023-12-17 DIAGNOSIS — I96 GANGRENE OF FOOT (HCC): ICD-10-CM

## 2023-12-17 DIAGNOSIS — R70.0 ELEVATED ERYTHROCYTE SEDIMENTATION RATE: ICD-10-CM

## 2023-12-17 DIAGNOSIS — E11.621 DIABETIC ULCER OF TOE OF RIGHT FOOT ASSOCIATED WITH TYPE 2 DIABETES MELLITUS, WITH NECROSIS OF MUSCLE (HCC): Primary | ICD-10-CM

## 2023-12-17 DIAGNOSIS — L97.513 DIABETIC ULCER OF TOE OF RIGHT FOOT ASSOCIATED WITH TYPE 2 DIABETES MELLITUS, WITH NECROSIS OF MUSCLE (HCC): Primary | ICD-10-CM

## 2023-12-17 DIAGNOSIS — E11.621 TYPE 2 DIABETES MELLITUS WITH FOOT ULCER, UNSPECIFIED WHETHER LONG TERM INSULIN USE (HCC): ICD-10-CM

## 2023-12-17 PROBLEM — E87.29 HIGH ANION GAP METABOLIC ACIDOSIS: Status: ACTIVE | Noted: 2023-12-17

## 2023-12-17 PROBLEM — I10 ESSENTIAL HYPERTENSION: Status: ACTIVE | Noted: 2023-12-17

## 2023-12-17 PROBLEM — R06.82 TACHYPNEA: Status: ACTIVE | Noted: 2023-12-17

## 2023-12-17 PROBLEM — E78.5 HYPERLIPIDEMIA: Status: ACTIVE | Noted: 2023-12-17

## 2023-12-17 PROBLEM — A41.9 SEPSIS (HCC): Status: ACTIVE | Noted: 2023-12-17

## 2023-12-17 PROBLEM — R00.0 TACHYCARDIA: Status: ACTIVE | Noted: 2023-12-17

## 2023-12-17 PROBLEM — D72.9 NEUTROPHILIC LEUKOCYTOSIS: Status: ACTIVE | Noted: 2023-12-17

## 2023-12-17 PROBLEM — E66.01 MORBID OBESITY DUE TO EXCESS CALORIES (HCC): Status: ACTIVE | Noted: 2023-12-17

## 2023-12-17 LAB
ALBUMIN SERPL-MCNC: 3 G/DL (ref 3.4–5)
ALBUMIN/GLOB SERPL: 0.6 {RATIO} (ref 1.1–2.2)
ALP SERPL-CCNC: 145 U/L (ref 40–129)
ALT SERPL-CCNC: 37 U/L (ref 10–40)
ANION GAP SERPL CALCULATED.3IONS-SCNC: 21 MMOL/L (ref 3–16)
AST SERPL-CCNC: 35 U/L (ref 15–37)
BASOPHILS # BLD: 0.1 K/UL (ref 0–0.2)
BASOPHILS NFR BLD: 0.4 %
BETA-HYDROXYBUTYRATE: 6.67 MMOL/L (ref 0–0.27)
BILIRUB SERPL-MCNC: 0.9 MG/DL (ref 0–1)
BUN SERPL-MCNC: 30 MG/DL (ref 7–20)
CALCIUM SERPL-MCNC: 9.7 MG/DL (ref 8.3–10.6)
CHLORIDE SERPL-SCNC: 85 MMOL/L (ref 99–110)
CO2 SERPL-SCNC: 18 MMOL/L (ref 21–32)
CREAT SERPL-MCNC: 1 MG/DL (ref 0.9–1.3)
CRP SERPL-MCNC: 601.8 MG/L (ref 0–5.1)
DEPRECATED RDW RBC AUTO: 13.8 % (ref 12.4–15.4)
EOSINOPHIL # BLD: 0 K/UL (ref 0–0.6)
EOSINOPHIL NFR BLD: 0 %
ERYTHROCYTE [SEDIMENTATION RATE] IN BLOOD BY WESTERGREN METHOD: 114 MM/HR (ref 0–20)
GFR SERPLBLD CREATININE-BSD FMLA CKD-EPI: >60 ML/MIN/{1.73_M2}
GLUCOSE BLD-MCNC: 325 MG/DL (ref 70–99)
GLUCOSE BLD-MCNC: 363 MG/DL (ref 70–99)
GLUCOSE BLD-MCNC: 376 MG/DL (ref 70–99)
GLUCOSE SERPL-MCNC: 384 MG/DL (ref 70–99)
HCT VFR BLD AUTO: 35.8 % (ref 40.5–52.5)
HGB BLD-MCNC: 12 G/DL (ref 13.5–17.5)
LACTATE BLDV-SCNC: 1.5 MMOL/L (ref 0.4–2)
LACTATE BLDV-SCNC: 1.8 MMOL/L (ref 0.4–2)
LYMPHOCYTES # BLD: 0.9 K/UL (ref 1–5.1)
LYMPHOCYTES NFR BLD: 2.9 %
MCH RBC QN AUTO: 30.3 PG (ref 26–34)
MCHC RBC AUTO-ENTMCNC: 33.5 G/DL (ref 31–36)
MCV RBC AUTO: 90.5 FL (ref 80–100)
MONOCYTES # BLD: 1.1 K/UL (ref 0–1.3)
MONOCYTES NFR BLD: 3.3 %
NEUTROPHILS # BLD: 29.9 K/UL (ref 1.7–7.7)
NEUTROPHILS NFR BLD: 93.4 %
PERFORMED ON: ABNORMAL
PLATELET # BLD AUTO: 663 K/UL (ref 135–450)
PMV BLD AUTO: 6.7 FL (ref 5–10.5)
POTASSIUM SERPL-SCNC: 4.7 MMOL/L (ref 3.5–5.1)
PROT SERPL-MCNC: 7.9 G/DL (ref 6.4–8.2)
RBC # BLD AUTO: 3.96 M/UL (ref 4.2–5.9)
SODIUM SERPL-SCNC: 124 MMOL/L (ref 136–145)
TROPONIN, HIGH SENSITIVITY: 15 NG/L (ref 0–22)
TROPONIN, HIGH SENSITIVITY: 18 NG/L (ref 0–22)
WBC # BLD AUTO: 32 K/UL (ref 4–11)

## 2023-12-17 PROCEDURE — 73630 X-RAY EXAM OF FOOT: CPT

## 2023-12-17 PROCEDURE — 2580000003 HC RX 258

## 2023-12-17 PROCEDURE — 87150 DNA/RNA AMPLIFIED PROBE: CPT

## 2023-12-17 PROCEDURE — 82010 KETONE BODYS QUAN: CPT

## 2023-12-17 PROCEDURE — 96365 THER/PROPH/DIAG IV INF INIT: CPT

## 2023-12-17 PROCEDURE — 87205 SMEAR GRAM STAIN: CPT

## 2023-12-17 PROCEDURE — 96374 THER/PROPH/DIAG INJ IV PUSH: CPT

## 2023-12-17 PROCEDURE — 99285 EMERGENCY DEPT VISIT HI MDM: CPT

## 2023-12-17 PROCEDURE — 87040 BLOOD CULTURE FOR BACTERIA: CPT

## 2023-12-17 PROCEDURE — 93005 ELECTROCARDIOGRAM TRACING: CPT

## 2023-12-17 PROCEDURE — 36415 COLL VENOUS BLD VENIPUNCTURE: CPT

## 2023-12-17 PROCEDURE — 83605 ASSAY OF LACTIC ACID: CPT

## 2023-12-17 PROCEDURE — 73701 CT LOWER EXTREMITY W/DYE: CPT

## 2023-12-17 PROCEDURE — 84484 ASSAY OF TROPONIN QUANT: CPT

## 2023-12-17 PROCEDURE — 2580000003 HC RX 258: Performed by: INTERNAL MEDICINE

## 2023-12-17 PROCEDURE — 6370000000 HC RX 637 (ALT 250 FOR IP): Performed by: INTERNAL MEDICINE

## 2023-12-17 PROCEDURE — 96368 THER/DIAG CONCURRENT INF: CPT

## 2023-12-17 PROCEDURE — 80053 COMPREHEN METABOLIC PANEL: CPT

## 2023-12-17 PROCEDURE — 87070 CULTURE OTHR SPECIMN AEROBIC: CPT

## 2023-12-17 PROCEDURE — 85025 COMPLETE CBC W/AUTO DIFF WBC: CPT

## 2023-12-17 PROCEDURE — 85652 RBC SED RATE AUTOMATED: CPT

## 2023-12-17 PROCEDURE — 87077 CULTURE AEROBIC IDENTIFY: CPT

## 2023-12-17 PROCEDURE — 86140 C-REACTIVE PROTEIN: CPT

## 2023-12-17 PROCEDURE — 6360000004 HC RX CONTRAST MEDICATION: Performed by: EMERGENCY MEDICINE

## 2023-12-17 PROCEDURE — 2100000000 HC CCU R&B

## 2023-12-17 PROCEDURE — 6360000002 HC RX W HCPCS

## 2023-12-17 RX ORDER — INSULIN LISPRO 100 [IU]/ML
0-8 INJECTION, SOLUTION INTRAVENOUS; SUBCUTANEOUS EVERY 4 HOURS
Status: DISCONTINUED | OUTPATIENT
Start: 2023-12-17 | End: 2023-12-18

## 2023-12-17 RX ORDER — MORPHINE SULFATE 4 MG/ML
4 INJECTION, SOLUTION INTRAMUSCULAR; INTRAVENOUS
Status: DISCONTINUED | OUTPATIENT
Start: 2023-12-17 | End: 2024-01-02 | Stop reason: HOSPADM

## 2023-12-17 RX ORDER — VANCOMYCIN 2 G/400ML
2000 INJECTION, SOLUTION INTRAVENOUS ONCE
Status: COMPLETED | OUTPATIENT
Start: 2023-12-17 | End: 2023-12-17

## 2023-12-17 RX ORDER — ACETAMINOPHEN 325 MG/1
650 TABLET ORAL EVERY 4 HOURS PRN
Status: DISCONTINUED | OUTPATIENT
Start: 2023-12-17 | End: 2024-01-02 | Stop reason: HOSPADM

## 2023-12-17 RX ORDER — ONDANSETRON 2 MG/ML
4 INJECTION INTRAMUSCULAR; INTRAVENOUS EVERY 4 HOURS PRN
Status: DISCONTINUED | OUTPATIENT
Start: 2023-12-17 | End: 2024-01-02 | Stop reason: HOSPADM

## 2023-12-17 RX ORDER — POLYETHYLENE GLYCOL 3350 17 G/17G
17 POWDER, FOR SOLUTION ORAL DAILY PRN
Status: DISCONTINUED | OUTPATIENT
Start: 2023-12-17 | End: 2024-01-02 | Stop reason: HOSPADM

## 2023-12-17 RX ORDER — MAGNESIUM SULFATE IN WATER 40 MG/ML
2000 INJECTION, SOLUTION INTRAVENOUS PRN
Status: DISCONTINUED | OUTPATIENT
Start: 2023-12-17 | End: 2024-01-02 | Stop reason: HOSPADM

## 2023-12-17 RX ORDER — SODIUM CHLORIDE 0.9 % (FLUSH) 0.9 %
10 SYRINGE (ML) INJECTION PRN
Status: DISCONTINUED | OUTPATIENT
Start: 2023-12-17 | End: 2024-01-02 | Stop reason: HOSPADM

## 2023-12-17 RX ORDER — INSULIN LISPRO 100 [IU]/ML
0-8 INJECTION, SOLUTION INTRAVENOUS; SUBCUTANEOUS
Status: DISCONTINUED | OUTPATIENT
Start: 2023-12-17 | End: 2023-12-17

## 2023-12-17 RX ORDER — ACETAMINOPHEN 650 MG/1
650 SUPPOSITORY RECTAL EVERY 4 HOURS PRN
Status: DISCONTINUED | OUTPATIENT
Start: 2023-12-17 | End: 2024-01-02 | Stop reason: HOSPADM

## 2023-12-17 RX ORDER — SODIUM CHLORIDE 9 MG/ML
INJECTION, SOLUTION INTRAVENOUS PRN
Status: DISCONTINUED | OUTPATIENT
Start: 2023-12-17 | End: 2024-01-02 | Stop reason: HOSPADM

## 2023-12-17 RX ORDER — OXYCODONE HYDROCHLORIDE 10 MG/1
10 TABLET ORAL EVERY 4 HOURS PRN
Status: DISCONTINUED | OUTPATIENT
Start: 2023-12-17 | End: 2024-01-02 | Stop reason: HOSPADM

## 2023-12-17 RX ORDER — 0.9 % SODIUM CHLORIDE 0.9 %
30 INTRAVENOUS SOLUTION INTRAVENOUS ONCE
Status: COMPLETED | OUTPATIENT
Start: 2023-12-17 | End: 2023-12-17

## 2023-12-17 RX ORDER — SODIUM CHLORIDE 0.9 % (FLUSH) 0.9 %
10 SYRINGE (ML) INJECTION EVERY 12 HOURS SCHEDULED
Status: DISCONTINUED | OUTPATIENT
Start: 2023-12-17 | End: 2024-01-02 | Stop reason: HOSPADM

## 2023-12-17 RX ORDER — POTASSIUM CHLORIDE 20 MEQ/1
40 TABLET, EXTENDED RELEASE ORAL PRN
Status: DISCONTINUED | OUTPATIENT
Start: 2023-12-17 | End: 2024-01-02 | Stop reason: HOSPADM

## 2023-12-17 RX ORDER — SODIUM CHLORIDE 9 MG/ML
INJECTION, SOLUTION INTRAVENOUS CONTINUOUS
Status: ACTIVE | OUTPATIENT
Start: 2023-12-17 | End: 2023-12-19

## 2023-12-17 RX ORDER — OXYCODONE HYDROCHLORIDE 5 MG/1
5 TABLET ORAL EVERY 4 HOURS PRN
Status: DISCONTINUED | OUTPATIENT
Start: 2023-12-17 | End: 2024-01-02 | Stop reason: HOSPADM

## 2023-12-17 RX ORDER — DEXTROSE MONOHYDRATE 100 MG/ML
INJECTION, SOLUTION INTRAVENOUS CONTINUOUS PRN
Status: DISCONTINUED | OUTPATIENT
Start: 2023-12-17 | End: 2024-01-02 | Stop reason: HOSPADM

## 2023-12-17 RX ORDER — VANCOMYCIN 1.75 G/350ML
1250 INJECTION, SOLUTION INTRAVENOUS EVERY 12 HOURS
Status: DISCONTINUED | OUTPATIENT
Start: 2023-12-18 | End: 2023-12-18

## 2023-12-17 RX ORDER — POTASSIUM CHLORIDE 7.45 MG/ML
10 INJECTION INTRAVENOUS PRN
Status: DISCONTINUED | OUTPATIENT
Start: 2023-12-17 | End: 2024-01-02 | Stop reason: HOSPADM

## 2023-12-17 RX ORDER — MORPHINE SULFATE 2 MG/ML
2 INJECTION, SOLUTION INTRAMUSCULAR; INTRAVENOUS
Status: DISCONTINUED | OUTPATIENT
Start: 2023-12-17 | End: 2024-01-02 | Stop reason: HOSPADM

## 2023-12-17 RX ORDER — INSULIN LISPRO 100 [IU]/ML
0-4 INJECTION, SOLUTION INTRAVENOUS; SUBCUTANEOUS NIGHTLY
Status: DISCONTINUED | OUTPATIENT
Start: 2023-12-17 | End: 2023-12-17

## 2023-12-17 RX ADMIN — VANCOMYCIN 2000 MG: 2 INJECTION, SOLUTION INTRAVENOUS at 15:49

## 2023-12-17 RX ADMIN — OXYCODONE HYDROCHLORIDE 10 MG: 10 TABLET ORAL at 23:16

## 2023-12-17 RX ADMIN — CEFEPIME 2000 MG: 2 INJECTION, POWDER, FOR SOLUTION INTRAVENOUS at 15:19

## 2023-12-17 RX ADMIN — SODIUM CHLORIDE, PRESERVATIVE FREE 10 ML: 5 INJECTION INTRAVENOUS at 21:34

## 2023-12-17 RX ADMIN — INSULIN LISPRO 8 UNITS: 100 INJECTION, SOLUTION INTRAVENOUS; SUBCUTANEOUS at 21:33

## 2023-12-17 RX ADMIN — SODIUM CHLORIDE 1914 ML: 9 INJECTION, SOLUTION INTRAVENOUS at 15:17

## 2023-12-17 RX ADMIN — IOPAMIDOL 75 ML: 755 INJECTION, SOLUTION INTRAVENOUS at 15:44

## 2023-12-17 RX ADMIN — INSULIN LISPRO 6 UNITS: 100 INJECTION, SOLUTION INTRAVENOUS; SUBCUTANEOUS at 23:16

## 2023-12-17 RX ADMIN — SODIUM CHLORIDE: 9 INJECTION, SOLUTION INTRAVENOUS at 18:54

## 2023-12-17 ASSESSMENT — PAIN SCALES - GENERAL
PAINLEVEL_OUTOF10: 3
PAINLEVEL_OUTOF10: 4
PAINLEVEL_OUTOF10: 7
PAINLEVEL_OUTOF10: 3

## 2023-12-17 ASSESSMENT — PAIN - FUNCTIONAL ASSESSMENT: PAIN_FUNCTIONAL_ASSESSMENT: PREVENTS OR INTERFERES SOME ACTIVE ACTIVITIES AND ADLS

## 2023-12-17 ASSESSMENT — PAIN DESCRIPTION - ORIENTATION
ORIENTATION: RIGHT
ORIENTATION: RIGHT

## 2023-12-17 ASSESSMENT — PAIN DESCRIPTION - DESCRIPTORS
DESCRIPTORS: ACHING
DESCRIPTORS: DISCOMFORT

## 2023-12-17 ASSESSMENT — PAIN DESCRIPTION - LOCATION
LOCATION: FOOT
LOCATION: FOOT

## 2023-12-17 NOTE — ED PROVIDER NOTES
[facility] EMERGENCY DEPARTMENT  EMERGENCY DEPARTMENT ENCOUNTER        Pt Name: Chucky Gregory  MRN: [id number]  Birthdate 1970  Date of evaluation: 12/17/2023  Provider: Monika Lauren PA-C  PCP: Vandana Flowers DO  Note Started: 2:32 PM EST 12/17/23       I have seen and evaluated this patient with my supervising physician Milvia Edward MD.      CHIEF COMPLAINT       Chief Complaint   Patient presents with    Foot Injury     Right foot infection. Hx diabetes, no access to insulin.       HISTORY OF PRESENT ILLNESS: 1 or more Elements     History From: Patient        Social Determinants Significantly Affecting Health : None    Chief Complaint: Foot ulcer    Chucky Gregory is a 53 y.o. male who presents to the ED via EMS with a complaint of a foot ulcer that started 5 days ago.  Patient lives in a camper by himself, he mentioned that he was taking insulin for his diabetes but then due to insurance he started taking metformin, currently patient does not take any medications, does not check sugars at home, he does not have  medical insurance, last time he took medication for his diabetes was in September.  Patient denies nausea, vomiting, fevers, shortness of breath, chest pain.  No urinary or bowel symptoms, denies having an ulcer in the past.    Nursing Notes were all reviewed and agreed with or any disagreements were addressed in the HPI.    REVIEW OF SYSTEMS :      Review of Systems    Positives and Pertinent negatives as per HPI.     SURGICAL HISTORY     Past Surgical History:   Procedure Laterality Date    FINGER SURGERY Left 09/27/2021    I&D done on left 3rd finger    HAND SURGERY Left 9/27/2021    LEFT HAND INCISION AND DRAINAGE MIDDLE FINGER performed by Rachel Lizarraga MD at [facility] OR    HERNIA REPAIR         CURRENTMEDICATIONS       Previous Medications    ATORVASTATIN (LIPITOR) 40 MG TABLET    Take 1 tablet by mouth nightly    BLOOD GLUCOSE MONITOR STRIPS    Test 3 times a

## 2023-12-17 NOTE — ED NOTES
Patient remains alert and oreinted. Pain score and patient condition reviewed. No acute distress noted. Report given to RN recieving patient to room 1308. All questions answered.        Doni Aguero RN  12/17/23 [id number]

## 2023-12-17 NOTE — ED NOTES
Blood culture #1 collected by Quincy SINGH RN. Blood Culture #2 collected by Leonardo COLE RN.     Tony Landaverde RN  12/17/23 [id number]

## 2023-12-17 NOTE — ED NOTES
Pt arrived to dept via ems.  Pt c/o   Right foot infection with pain. Hx diabetes, no access to insulin. Pt complains of lightheadedness at this time.  Pt awake, alert and oriented x 3.  Skin warm and dry/normal color for ethnicity.  Resp easy and unlabored.  Pt placed in gown and on cardiac monitor.  Call light in reach.       Doni Aguero RN  12/17/23 [id number]

## 2023-12-17 NOTE — ED PROVIDER NOTES
[facility] EMERGENCY DEPARTMENT  EMERGENCY DEPARTMENT ENCOUNTER      Pt Name: Chucky Gregory  MRN: [id number]  Birthdate 1970  Date of evaluation: 12/17/2023  Provider: Milvia Edward MD  PCP: Vandana Flowers DO  Note Started: 2:41 PM EST 12/17/23    ED Attending Attestation Note     CHIEF COMPLAINT       Chief Complaint   Patient presents with    Foot Injury     Right foot infection. Hx diabetes, no access to insulin.     EMERGENCY DEPARTMENT COURSE and DIFFERENTIAL DIAGNOSIS/MDM:      This patient was seen by the advance practice provider.  In addition to the SHAN I personally saw Chucky Gregory and performed a substantive portion of the visit including all aspects of the medical decision making.     Briefly, this is a 53 y.o. male who presents to the emergency department secondary to concern for foot ulcers that started 5 days ago.  He reports he lives alone in a trailer.  Has a history of diabetes he has not been able to access any insulin.  He reports that after running out of insulin he did take metformin but he ran out of that in September (he reports he does not have any insurance).  Has not previously had foot ulcers.  No fever, chills, nausea, vomiting chest pain, shortness of breath.  Denies any pain in his foot though does endorse decreased sensation at baseline.    On exam he has a very concerning foot. He reports he cannot really feel touch to the foot. Fortunately he does have palpable DP pulses so more likely the current etiology is related to infection/DM versus ischemic limb. Labs with multiple concerning findings consistent with acute infection, hyperglycemia. CT reveals concern for nec fasc and antibiotics started on EMRA guidelines. SHAN discussed results of CT and case with podiatry and in agreement with antibiotics, will plan to see tomorrow. Consulted hospitalist for admission. Discussed goals of care he is a full code.                 Critical Care:  I personally saw the

## 2023-12-18 PROBLEM — R78.81 BACTEREMIA DUE TO GROUP B STREPTOCOCCUS: Status: ACTIVE | Noted: 2023-12-18

## 2023-12-18 PROBLEM — E11.10 DIABETIC KETOACIDOSIS WITHOUT COMA ASSOCIATED WITH TYPE 2 DIABETES MELLITUS (HCC): Status: ACTIVE | Noted: 2023-12-18

## 2023-12-18 PROBLEM — B95.1 BACTEREMIA DUE TO GROUP B STREPTOCOCCUS: Status: ACTIVE | Noted: 2023-12-18

## 2023-12-18 PROBLEM — E11.52 DIABETIC WET GANGRENE OF THE FOOT (HCC): Status: ACTIVE | Noted: 2023-12-18

## 2023-12-18 PROBLEM — L97.513 DIABETIC ULCER OF TOE OF RIGHT FOOT ASSOCIATED WITH TYPE 2 DIABETES MELLITUS, WITH NECROSIS OF MUSCLE (HCC): Status: ACTIVE | Noted: 2023-12-18

## 2023-12-18 PROBLEM — E11.621 DIABETIC ULCER OF TOE OF RIGHT FOOT ASSOCIATED WITH TYPE 2 DIABETES MELLITUS, WITH NECROSIS OF MUSCLE (HCC): Status: ACTIVE | Noted: 2023-12-18

## 2023-12-18 LAB
ANION GAP SERPL CALCULATED.3IONS-SCNC: 13 MMOL/L (ref 3–16)
APTT BLD: 32.6 SEC (ref 22.7–35.9)
BASOPHILS # BLD: 0.1 K/UL (ref 0–0.2)
BASOPHILS NFR BLD: 0.5 %
BUN SERPL-MCNC: 23 MG/DL (ref 7–20)
CALCIUM SERPL-MCNC: 8.3 MG/DL (ref 8.3–10.6)
CHLORIDE SERPL-SCNC: 94 MMOL/L (ref 99–110)
CO2 SERPL-SCNC: 22 MMOL/L (ref 21–32)
CREAT SERPL-MCNC: 0.8 MG/DL (ref 0.9–1.3)
DEPRECATED RDW RBC AUTO: 13.8 % (ref 12.4–15.4)
EKG ATRIAL RATE: 116 BPM
EKG DIAGNOSIS: NORMAL
EKG P AXIS: 67 DEGREES
EKG P-R INTERVAL: 136 MS
EKG Q-T INTERVAL: 358 MS
EKG QRS DURATION: 136 MS
EKG QTC CALCULATION (BAZETT): 497 MS
EKG R AXIS: -69 DEGREES
EKG T AXIS: 44 DEGREES
EKG VENTRICULAR RATE: 116 BPM
EOSINOPHIL # BLD: 0 K/UL (ref 0–0.6)
EOSINOPHIL NFR BLD: 0.1 %
GFR SERPLBLD CREATININE-BSD FMLA CKD-EPI: >60 ML/MIN/{1.73_M2}
GLUCOSE BLD-MCNC: 202 MG/DL (ref 70–99)
GLUCOSE BLD-MCNC: 205 MG/DL (ref 70–99)
GLUCOSE BLD-MCNC: 228 MG/DL (ref 70–99)
GLUCOSE BLD-MCNC: 235 MG/DL (ref 70–99)
GLUCOSE BLD-MCNC: 265 MG/DL (ref 70–99)
GLUCOSE BLD-MCNC: 332 MG/DL (ref 70–99)
GLUCOSE SERPL-MCNC: 223 MG/DL (ref 70–99)
HCT VFR BLD AUTO: 29.6 % (ref 40.5–52.5)
HGB BLD-MCNC: 10.2 G/DL (ref 13.5–17.5)
INR PPP: 1.4 (ref 0.84–1.16)
LYMPHOCYTES # BLD: 1.3 K/UL (ref 1–5.1)
LYMPHOCYTES NFR BLD: 5 %
MAGNESIUM SERPL-MCNC: 1.9 MG/DL (ref 1.8–2.4)
MCH RBC QN AUTO: 30.8 PG (ref 26–34)
MCHC RBC AUTO-ENTMCNC: 34.6 G/DL (ref 31–36)
MCV RBC AUTO: 89.1 FL (ref 80–100)
MONOCYTES # BLD: 1.5 K/UL (ref 0–1.3)
MONOCYTES NFR BLD: 5.7 %
NEUTROPHILS # BLD: 23.3 K/UL (ref 1.7–7.7)
NEUTROPHILS NFR BLD: 88.7 %
PERFORMED ON: ABNORMAL
PHOSPHATE SERPL-MCNC: 3.3 MG/DL (ref 2.5–4.9)
PLATELET # BLD AUTO: 477 K/UL (ref 135–450)
PMV BLD AUTO: 6.7 FL (ref 5–10.5)
POTASSIUM SERPL-SCNC: 4.3 MMOL/L (ref 3.5–5.1)
PROTHROMBIN TIME: 17.2 SEC (ref 11.5–14.8)
RBC # BLD AUTO: 3.32 M/UL (ref 4.2–5.9)
REPORT: NORMAL
REPORT: NORMAL
SODIUM SERPL-SCNC: 129 MMOL/L (ref 136–145)
VANCOMYCIN SERPL-MCNC: 15.5 UG/ML
WBC # BLD AUTO: 26.3 K/UL (ref 4–11)

## 2023-12-18 PROCEDURE — 83735 ASSAY OF MAGNESIUM: CPT

## 2023-12-18 PROCEDURE — 6360000002 HC RX W HCPCS: Performed by: STUDENT IN AN ORGANIZED HEALTH CARE EDUCATION/TRAINING PROGRAM

## 2023-12-18 PROCEDURE — 85610 PROTHROMBIN TIME: CPT

## 2023-12-18 PROCEDURE — 6370000000 HC RX 637 (ALT 250 FOR IP): Performed by: INTERNAL MEDICINE

## 2023-12-18 PROCEDURE — 99291 CRITICAL CARE FIRST HOUR: CPT | Performed by: INTERNAL MEDICINE

## 2023-12-18 PROCEDURE — 80048 BASIC METABOLIC PNL TOTAL CA: CPT

## 2023-12-18 PROCEDURE — 36415 COLL VENOUS BLD VENIPUNCTURE: CPT

## 2023-12-18 PROCEDURE — 2580000003 HC RX 258: Performed by: INTERNAL MEDICINE

## 2023-12-18 PROCEDURE — 6370000000 HC RX 637 (ALT 250 FOR IP): Performed by: NURSE PRACTITIONER

## 2023-12-18 PROCEDURE — 80202 ASSAY OF VANCOMYCIN: CPT

## 2023-12-18 PROCEDURE — 99254 IP/OBS CNSLTJ NEW/EST MOD 60: CPT | Performed by: INTERNAL MEDICINE

## 2023-12-18 PROCEDURE — 85025 COMPLETE CBC W/AUTO DIFF WBC: CPT

## 2023-12-18 PROCEDURE — 84100 ASSAY OF PHOSPHORUS: CPT

## 2023-12-18 PROCEDURE — 2100000000 HC CCU R&B

## 2023-12-18 PROCEDURE — 6360000002 HC RX W HCPCS: Performed by: INTERNAL MEDICINE

## 2023-12-18 PROCEDURE — 99253 IP/OBS CNSLTJ NEW/EST LOW 45: CPT | Performed by: SURGERY

## 2023-12-18 PROCEDURE — 6360000002 HC RX W HCPCS: Performed by: NURSE PRACTITIONER

## 2023-12-18 PROCEDURE — 93010 ELECTROCARDIOGRAM REPORT: CPT | Performed by: INTERNAL MEDICINE

## 2023-12-18 PROCEDURE — 85730 THROMBOPLASTIN TIME PARTIAL: CPT

## 2023-12-18 PROCEDURE — 6370000000 HC RX 637 (ALT 250 FOR IP): Performed by: STUDENT IN AN ORGANIZED HEALTH CARE EDUCATION/TRAINING PROGRAM

## 2023-12-18 RX ORDER — INSULIN GLARGINE 100 [IU]/ML
10 INJECTION, SOLUTION SUBCUTANEOUS NIGHTLY
Status: DISCONTINUED | OUTPATIENT
Start: 2023-12-18 | End: 2023-12-20

## 2023-12-18 RX ORDER — METRONIDAZOLE 500 MG/100ML
500 INJECTION, SOLUTION INTRAVENOUS EVERY 8 HOURS
Status: COMPLETED | OUTPATIENT
Start: 2023-12-18 | End: 2023-12-25

## 2023-12-18 RX ORDER — INSULIN LISPRO 100 [IU]/ML
0-16 INJECTION, SOLUTION INTRAVENOUS; SUBCUTANEOUS EVERY 4 HOURS
Status: DISCONTINUED | OUTPATIENT
Start: 2023-12-18 | End: 2024-01-02 | Stop reason: HOSPADM

## 2023-12-18 RX ORDER — MAGNESIUM SULFATE IN WATER 40 MG/ML
2000 INJECTION, SOLUTION INTRAVENOUS ONCE
Status: COMPLETED | OUTPATIENT
Start: 2023-12-18 | End: 2023-12-18

## 2023-12-18 RX ADMIN — OXYCODONE HYDROCHLORIDE 5 MG: 5 TABLET ORAL at 21:03

## 2023-12-18 RX ADMIN — INSULIN LISPRO 4 UNITS: 100 INJECTION, SOLUTION INTRAVENOUS; SUBCUTANEOUS at 18:58

## 2023-12-18 RX ADMIN — VANCOMYCIN 1250 MG: 1.75 INJECTION, SOLUTION INTRAVENOUS at 03:09

## 2023-12-18 RX ADMIN — PIPERACILLIN AND TAZOBACTAM 4500 MG: 4; .5 INJECTION, POWDER, LYOPHILIZED, FOR SOLUTION INTRAVENOUS at 17:50

## 2023-12-18 RX ADMIN — METRONIDAZOLE 500 MG: 500 INJECTION, SOLUTION INTRAVENOUS at 15:28

## 2023-12-18 RX ADMIN — CEFEPIME 2000 MG: 2 INJECTION, POWDER, FOR SOLUTION INTRAVENOUS at 04:41

## 2023-12-18 RX ADMIN — INSULIN GLARGINE 10 UNITS: 100 INJECTION, SOLUTION SUBCUTANEOUS at 21:04

## 2023-12-18 RX ADMIN — SODIUM CHLORIDE, PRESERVATIVE FREE 10 ML: 5 INJECTION INTRAVENOUS at 08:29

## 2023-12-18 RX ADMIN — INSULIN LISPRO 2 UNITS: 100 INJECTION, SOLUTION INTRAVENOUS; SUBCUTANEOUS at 08:26

## 2023-12-18 RX ADMIN — MORPHINE SULFATE 4 MG: 4 INJECTION, SOLUTION INTRAMUSCULAR; INTRAVENOUS at 12:36

## 2023-12-18 RX ADMIN — INSULIN LISPRO 2 UNITS: 100 INJECTION, SOLUTION INTRAVENOUS; SUBCUTANEOUS at 03:14

## 2023-12-18 RX ADMIN — METRONIDAZOLE 500 MG: 500 INJECTION, SOLUTION INTRAVENOUS at 22:05

## 2023-12-18 RX ADMIN — SODIUM CHLORIDE: 9 INJECTION, SOLUTION INTRAVENOUS at 23:59

## 2023-12-18 RX ADMIN — INSULIN LISPRO 8 UNITS: 100 INJECTION, SOLUTION INTRAVENOUS; SUBCUTANEOUS at 11:13

## 2023-12-18 RX ADMIN — INSULIN LISPRO 12 UNITS: 100 INJECTION, SOLUTION INTRAVENOUS; SUBCUTANEOUS at 21:04

## 2023-12-18 RX ADMIN — SODIUM CHLORIDE: 9 INJECTION, SOLUTION INTRAVENOUS at 15:26

## 2023-12-18 RX ADMIN — SODIUM CHLORIDE, PRESERVATIVE FREE 10 ML: 5 INJECTION INTRAVENOUS at 21:04

## 2023-12-18 RX ADMIN — OXYCODONE HYDROCHLORIDE 5 MG: 5 TABLET ORAL at 10:25

## 2023-12-18 RX ADMIN — SODIUM CHLORIDE: 9 INJECTION, SOLUTION INTRAVENOUS at 08:29

## 2023-12-18 RX ADMIN — MAGNESIUM SULFATE HEPTAHYDRATE 2000 MG: 40 INJECTION, SOLUTION INTRAVENOUS at 11:04

## 2023-12-18 ASSESSMENT — PAIN DESCRIPTION - ORIENTATION
ORIENTATION: RIGHT

## 2023-12-18 ASSESSMENT — PAIN - FUNCTIONAL ASSESSMENT: PAIN_FUNCTIONAL_ASSESSMENT: PREVENTS OR INTERFERES SOME ACTIVE ACTIVITIES AND ADLS

## 2023-12-18 ASSESSMENT — PAIN DESCRIPTION - DESCRIPTORS
DESCRIPTORS: ACHING
DESCRIPTORS: ACHING;BURNING;CRAMPING
DESCRIPTORS: ACHING
DESCRIPTORS: ACHING;DISCOMFORT;CRAMPING

## 2023-12-18 ASSESSMENT — PAIN SCALES - GENERAL
PAINLEVEL_OUTOF10: 4
PAINLEVEL_OUTOF10: 5
PAINLEVEL_OUTOF10: 8
PAINLEVEL_OUTOF10: 0
PAINLEVEL_OUTOF10: 6

## 2023-12-18 ASSESSMENT — PAIN DESCRIPTION - LOCATION
LOCATION: LEG;FOOT
LOCATION: FOOT
LOCATION: LEG;FOOT
LOCATION: LEG

## 2023-12-18 NOTE — H&P
Hospital Medicine  History and Physical    PCP: Vandana Flowers DO  Patient Name: Chucky Gregory    Information for this report comes from multiple sources including the emergency room providers, the patient (when able to provide information), and from family/friends when at bedside     Date of Service: Pt seen/examined on 12/17/23 and admitted to Inpatient with expected LOS greater than two midnights due to medical therapy    CHIEF COMPLAINT:  Pt c/o right foot ulcer  HISTORY OF PRESENT ILLNESS: Pt is an 53 y.o. year-old male with a history that includes hypertension, hyperlipidemia and type II diabetes mellitus.  He presents to the emergency room for evaluation following a 5-day history of an ulceration on the right foot which has been progressively worsening.  He states that he has stopped taking his medications, including medications for diabetes.  He states that he was on metformin but stopped taking that in September of this year.  He states that he has been prescribed insulin in the past but does not take it because his insurance is not covering it.  In the emergency room he was found to have a right foot ulcer with associated sepsis.  Imaging revealed tissue emphysema and gas gangrene.  The emergency room provider discussed these findings with podiatry and they said that they plan to follow-up with him in the morning. Associated signs and symptoms do not include fever or chills, nausea, vomiting, abdominal pain, hemoptysis, hematochezia, diarrhea, constipation or urinary symptoms.  He is being admitted for further evaluation and treatment.       Past Medical History:        Diagnosis Date    Abscess of hand, left     Cellulitis of left hand 9/27/2021    Diabetes education, encounter for     DM (diabetes mellitus) (HCC) 09/27/2021    Infection due to Enterobacter cloacae     Suppurative tenosynovitis of flexor tendon of left hand        Past Surgical History:        Procedure Laterality Date    FINGER SURGERY

## 2023-12-18 NOTE — SUBJECTIVE & OBJECTIVE
Subjective:     ***    Objective:     Vitals:    12/17/23 1800 12/17/23 1815 12/17/23 1849 12/17/23 2012   BP:   (!) 148/76    Pulse: (!) 116 (!) 115 (!) 114 (!) 118   Resp: 16 20 17    Temp:   99.1 °F (37.3 °C)    TempSrc:   Oral    SpO2: 100% 97% 98%    Weight:       Height:            Intake andOutput:  Current Shift: No intake/output data recorded.  Last three shifts: 12/16 0701 - 12/17 1900  In: 2014 [I.V.:1914]  Out: -      Physical Exam      Medications:  Current Facility-Administered Medications   Medication Dose Route Frequency    glucose chewable tablet 16 g  4 tablet Oral PRN    dextrose bolus 10% 125 mL  125 mL IntraVENous PRN    Or    dextrose bolus 10% 250 mL  250 mL IntraVENous PRN    glucagon (rDNA) injection 1 mg  1 mg SubCUTAneous PRN    dextrose 10 % infusion   IntraVENous Continuous PRN    sodium chloride flush 0.9 % injection 10 mL  10 mL IntraVENous 2 times per day    sodium chloride flush 0.9 % injection 10 mL  10 mL IntraVENous PRN    0.9 % sodium chloride infusion   IntraVENous PRN    potassium chloride (KLOR-CON M) extended release tablet 40 mEq  40 mEq Oral PRN    Or    potassium bicarb-citric acid (EFFER-K) effervescent tablet 40 mEq  40 mEq Oral PRN    Or    potassium chloride 10 mEq/100 mL IVPB (Peripheral Line)  10 mEq IntraVENous PRN    magnesium sulfate 2000 mg in 50 mL IVPB premix  2,000 mg IntraVENous PRN    ondansetron (ZOFRAN) injection 4 mg  4 mg IntraVENous Q4H PRN    polyethylene glycol (GLYCOLAX) packet 17 g  17 g Oral Daily PRN    acetaminophen (TYLENOL) tablet 650 mg  650 mg Oral Q4H PRN    Or    acetaminophen (TYLENOL) suppository 650 mg  650 mg Rectal Q4H PRN    0.9 % sodium chloride infusion   IntraVENous Continuous    insulin lispro (HUMALOG) injection vial 0-8 Units  0-8 Units SubCUTAneous Q4H    [START ON 12/18/2023] ceFEPIme (MAXIPIME) 2,000 mg in sodium chloride 0.9 % 100 mL IVPB (mini-bag)  2,000 mg IntraVENous Q12H    [START ON 12/18/2023] vancomycin

## 2023-12-18 NOTE — CONSULTS
VASCULAR SURGERY CONSULTATION    Chucky Gregory is a 53 y.o. male admitted with foul smelling R foot gangrene of an unknown duration but reorted as 5 days by patient. No pain as pt has neuropathy. Hyperglycemia and leukocytosis noted on admission. No previous foot surgery, ulcerations or vascular issues/intervention. Asked by Freddy Contreras DPM to see pt for management and consideration for major amputation.    Past Medical History:   Diagnosis Date    Abscess of hand, left     Cellulitis of left hand 9/27/2021    Diabetes education, encounter for     DM (diabetes mellitus) (HCC) 09/27/2021    Infection due to Enterobacter cloacae     Suppurative tenosynovitis of flexor tendon of left hand      Past Surgical History:   Procedure Laterality Date    FINGER SURGERY Left 09/27/2021    I&D done on left 3rd finger    HAND SURGERY Left 9/27/2021    LEFT HAND INCISION AND DRAINAGE MIDDLE FINGER performed by Rachel Lizarraga MD at [facility] OR    HERNIA REPAIR       Family History   Problem Relation Age of Onset    No Known Problems Mother     No Known Problems Father      Social History     Socioeconomic History    Marital status: Single   Tobacco Use    Smoking status: Former     Current packs/day: 0.00     Types: Cigarettes     Quit date: 1/1/2020     Years since quitting: 3.9    Smokeless tobacco: Current     Types: Snuff   Vaping Use    Vaping Use: Never used   Substance and Sexual Activity    Alcohol use: Yes     Alcohol/week: 4.0 standard drinks of alcohol     Types: 4 Cans of beer per week    Drug use: Never     Current Facility-Administered Medications   Medication Dose Route Frequency Provider Last Rate Last Admin    insulin lispro (HUMALOG) injection vial 0-16 Units  0-16 Units SubCUTAneous Q4H Valorie Erazo, APRN - CNP   8 Units at 12/18/23 1113    insulin glargine (LANTUS) injection vial 10 Units  10 Units SubCUTAneous Nightly Swapnil Krueger MD        metronidazole (FLAGYL) 500 mg in 0.9% NaCl 100 mL IVPB premix  500 
Enterobacter cloacae     Suppurative tenosynovitis of flexor tendon of left hand        Past Surgical History:    Past Surgical History:   Procedure Laterality Date    FINGER SURGERY Left 09/27/2021    I&D done on left 3rd finger    HAND SURGERY Left 9/27/2021    LEFT HAND INCISION AND DRAINAGE MIDDLE FINGER performed by Rachel Lizarraga MD at [facility] OR    HERNIA REPAIR         Current Medications:    No outpatient medications have been marked as taking for the 12/17/23 encounter (Hospital Encounter).       Allergies:  Atorvastatin and Metformin and related    Immunizations :   Immunization History   Administered Date(s) Administered    COVID-19, PFIZER PURPLE top, DILUTE for use, (age 12 y+), 30mcg/0.3mL 05/13/2021, 06/03/2021    DTaP vaccine 09/27/2021    Influenza, FLUCELVAX, (age 6 mo+), MDCK, PF, 0.5mL 10/08/2021    Td vaccine (adult) 03/17/1997         Social History:    Social History     Tobacco Use    Smoking status: Former     Current packs/day: 0.00     Types: Cigarettes     Quit date: 1/1/2020     Years since quitting: 3.9    Smokeless tobacco: Current     Types: Snuff   Vaping Use    Vaping Use: Never used   Substance Use Topics    Alcohol use: Yes     Alcohol/week: 4.0 standard drinks of alcohol     Types: 4 Cans of beer per week    Drug use: Never     Social History     Tobacco Use   Smoking Status Former    Current packs/day: 0.00    Types: Cigarettes    Quit date: 1/1/2020    Years since quitting: 3.9   Smokeless Tobacco Current    Types: Snuff      Family History   Problem Relation Age of Onset    No Known Problems Mother     No Known Problems Father          REVIEW OF SYSTEMS:     Constitutional:   fevers ++ chills++ , night sweats  Eyes:  negative for blurred vision, eye discharge, visual disturbance   HEENT:  negative for hearing loss, ear drainage,nasal congestion  Respiratory:  negative for cough, shortness of breath or hemoptysis   Cardiovascular:  negative for chest pain, palpitations, 
injection 10 mL, 10 mL, IntraVENous, 2 times per day  sodium chloride flush 0.9 % injection 10 mL, 10 mL, IntraVENous, PRN  0.9 % sodium chloride infusion, , IntraVENous, PRN  potassium chloride (KLOR-CON M) extended release tablet 40 mEq, 40 mEq, Oral, PRN **OR** potassium bicarb-citric acid (EFFER-K) effervescent tablet 40 mEq, 40 mEq, Oral, PRN **OR** potassium chloride 10 mEq/100 mL IVPB (Peripheral Line), 10 mEq, IntraVENous, PRN  magnesium sulfate 2000 mg in 50 mL IVPB premix, 2,000 mg, IntraVENous, PRN  ondansetron (ZOFRAN) injection 4 mg, 4 mg, IntraVENous, Q4H PRN  polyethylene glycol (GLYCOLAX) packet 17 g, 17 g, Oral, Daily PRN  acetaminophen (TYLENOL) tablet 650 mg, 650 mg, Oral, Q4H PRN **OR** acetaminophen (TYLENOL) suppository 650 mg, 650 mg, Rectal, Q4H PRN  0.9 % sodium chloride infusion, , IntraVENous, Continuous  oxyCODONE (ROXICODONE) immediate release tablet 5 mg, 5 mg, Oral, Q4H PRN **OR** oxyCODONE HCl (OXY-IR) immediate release tablet 10 mg, 10 mg, Oral, Q4H PRN  morphine (PF) injection 2 mg, 2 mg, IntraVENous, Q2H PRN **OR** morphine (PF) injection 4 mg, 4 mg, IntraVENous, Q2H PRN  Allergies:   Atorvastatin and Metformin and related  Social History:    TOBACCO:  Former smoker.  Type of tobacco used:  Cigarettes.  Quantity per day:  1 pack(s).  Duration of tobacco use:  20+ years.  Approximate Quit Date:  3 years ago.  ETOH:  Current alcohol usage:  Type of Drink(s):  Liquor/Mixed drink.  Frequency of use:  Daily.  Duration of alcohol use:  20+ years.  Approximate date of last drink:  4 drinks / week this past week.  Patient currently lives in a [facility]  Family History:       Problem Relation Age of Onset    No Known Problems Mother     No Known Problems Father      REVIEW OF SYSTEMS:    CONSTITUTIONAL:  negative  INTEGUMENT/BREAST:  positive for skin lesion(s), dryness, skin color change, changes in lesion, changes in hair, and changes in nails  ENDOCRINE:  positive for diabetic 
input(s): \"APTT\" in the last 72 hours.  UA:No results for input(s): \"NITRITE\", \"COLORU\", \"PHUR\", \"LABCAST\", \"WBCUA\", \"RBCUA\", \"MUCUS\", \"TRICHOMONAS\", \"YEAST\", \"BACTERIA\", \"CLARITYU\", \"SPECGRAV\", \"LEUKOCYTESUR\", \"UROBILINOGEN\", \"BILIRUBINUR\", \"BLOODU\", \"GLUCOSEU\", \"AMORPHOUS\" in the last 72 hours.    Invalid input(s): \"KETONESU\"  No results for input(s): \"PHART\", \"JEC3QJP\", \"PO2ART\" in the last 72 hours.         Radiology Review:  Pertinent images / reports were reviewed as a part of this visit.      Access  CVC   Arterial          PICC             CVC                  Shine         Assessment/Plan:     Right foot gas gangrene  -Surgery consult    Sepsis due to gangrene  -Lactate normal  -Blood cultures x 2    Group B strep bacteremia  -Cefepime/vancomycin    Morbid obesity BMI greater than 35    No current critical care or pulmonary needs.  Will sign off at this time.     This note was transcribed using Dragon Dictation software. Please disregard any translational errors.    Thank you for the consult    Uvaldo Farris [facility], [facility] Care [facility]

## 2023-12-19 PROBLEM — I96 GANGRENE OF FOOT (HCC): Status: ACTIVE | Noted: 2023-12-19

## 2023-12-19 LAB
ANION GAP SERPL CALCULATED.3IONS-SCNC: 9 MMOL/L (ref 3–16)
BACTERIA SPEC AEROBE CULT: ABNORMAL
BACTERIA SPEC AEROBE CULT: ABNORMAL
BASOPHILS # BLD: 0.1 K/UL (ref 0–0.2)
BASOPHILS NFR BLD: 0.3 %
BUN SERPL-MCNC: 20 MG/DL (ref 7–20)
CALCIUM SERPL-MCNC: 8.1 MG/DL (ref 8.3–10.6)
CHLORIDE SERPL-SCNC: 98 MMOL/L (ref 99–110)
CO2 SERPL-SCNC: 23 MMOL/L (ref 21–32)
CREAT SERPL-MCNC: 0.8 MG/DL (ref 0.9–1.3)
CRP SERPL-MCNC: 394 MG/L (ref 0–5.1)
DEPRECATED RDW RBC AUTO: 13.9 % (ref 12.4–15.4)
EOSINOPHIL # BLD: 0 K/UL (ref 0–0.6)
EOSINOPHIL NFR BLD: 0.2 %
EST. AVERAGE GLUCOSE BLD GHB EST-MCNC: 266.1 MG/DL
GFR SERPLBLD CREATININE-BSD FMLA CKD-EPI: >60 ML/MIN/{1.73_M2}
GLUCOSE BLD-MCNC: 175 MG/DL (ref 70–99)
GLUCOSE BLD-MCNC: 185 MG/DL (ref 70–99)
GLUCOSE BLD-MCNC: 239 MG/DL (ref 70–99)
GLUCOSE BLD-MCNC: 248 MG/DL (ref 70–99)
GLUCOSE BLD-MCNC: 254 MG/DL (ref 70–99)
GLUCOSE BLD-MCNC: 271 MG/DL (ref 70–99)
GLUCOSE SERPL-MCNC: 199 MG/DL (ref 70–99)
GRAM STN SPEC: ABNORMAL
HBA1C MFR BLD: 10.9 %
HCT VFR BLD AUTO: 29.4 % (ref 40.5–52.5)
HGB BLD-MCNC: 9.9 G/DL (ref 13.5–17.5)
LYMPHOCYTES # BLD: 1.3 K/UL (ref 1–5.1)
LYMPHOCYTES NFR BLD: 5.9 %
MCH RBC QN AUTO: 30.3 PG (ref 26–34)
MCHC RBC AUTO-ENTMCNC: 33.8 G/DL (ref 31–36)
MCV RBC AUTO: 89.5 FL (ref 80–100)
MONOCYTES # BLD: 1.2 K/UL (ref 0–1.3)
MONOCYTES NFR BLD: 5.4 %
NEUTROPHILS # BLD: 19.9 K/UL (ref 1.7–7.7)
NEUTROPHILS NFR BLD: 88.2 %
ORGANISM: ABNORMAL
PERFORMED ON: ABNORMAL
PLATELET # BLD AUTO: 389 K/UL (ref 135–450)
PMV BLD AUTO: 6.6 FL (ref 5–10.5)
POTASSIUM SERPL-SCNC: 4.2 MMOL/L (ref 3.5–5.1)
PROCALCITONIN SERPL IA-MCNC: 0.59 NG/ML (ref 0–0.15)
RBC # BLD AUTO: 3.29 M/UL (ref 4.2–5.9)
REASON FOR REJECTION: NORMAL
REJECTED TEST: NORMAL
SODIUM SERPL-SCNC: 130 MMOL/L (ref 136–145)
WBC # BLD AUTO: 22.5 K/UL (ref 4–11)

## 2023-12-19 PROCEDURE — 94760 N-INVAS EAR/PLS OXIMETRY 1: CPT

## 2023-12-19 PROCEDURE — 2100000000 HC CCU R&B

## 2023-12-19 PROCEDURE — 83036 HEMOGLOBIN GLYCOSYLATED A1C: CPT

## 2023-12-19 PROCEDURE — 88305 TISSUE EXAM BY PATHOLOGIST: CPT

## 2023-12-19 PROCEDURE — 7100000001 HC PACU RECOVERY - ADDTL 15 MIN

## 2023-12-19 PROCEDURE — 86140 C-REACTIVE PROTEIN: CPT

## 2023-12-19 PROCEDURE — 87102 FUNGUS ISOLATION CULTURE: CPT

## 2023-12-19 PROCEDURE — 6360000004 HC RX CONTRAST MEDICATION

## 2023-12-19 PROCEDURE — 84145 PROCALCITONIN (PCT): CPT

## 2023-12-19 PROCEDURE — 87070 CULTURE OTHR SPECIMN AEROBIC: CPT

## 2023-12-19 PROCEDURE — 87205 SMEAR GRAM STAIN: CPT

## 2023-12-19 PROCEDURE — 36415 COLL VENOUS BLD VENIPUNCTURE: CPT

## 2023-12-19 PROCEDURE — 87040 BLOOD CULTURE FOR BACTERIA: CPT

## 2023-12-19 PROCEDURE — 87075 CULTR BACTERIA EXCEPT BLOOD: CPT

## 2023-12-19 PROCEDURE — 87077 CULTURE AEROBIC IDENTIFY: CPT

## 2023-12-19 PROCEDURE — 2580000003 HC RX 258: Performed by: SURGERY

## 2023-12-19 PROCEDURE — 2580000003 HC RX 258: Performed by: INTERNAL MEDICINE

## 2023-12-19 PROCEDURE — 3600000004 HC SURGERY LEVEL 4 BASE: Performed by: SURGERY

## 2023-12-19 PROCEDURE — 6360000002 HC RX W HCPCS: Performed by: SURGERY

## 2023-12-19 PROCEDURE — A4217 STERILE WATER/SALINE, 500 ML: HCPCS | Performed by: SURGERY

## 2023-12-19 PROCEDURE — 87076 CULTURE ANAEROBE IDENT EACH: CPT

## 2023-12-19 PROCEDURE — 0Y6H0Z3 DETACHMENT AT RIGHT LOWER LEG, LOW, OPEN APPROACH: ICD-10-PCS | Performed by: SURGERY

## 2023-12-19 PROCEDURE — 2709999900 HC NON-CHARGEABLE SUPPLY: Performed by: SURGERY

## 2023-12-19 PROCEDURE — 27882 AMPUTATION OF LOWER LEG: CPT | Performed by: SURGERY

## 2023-12-19 PROCEDURE — 80048 BASIC METABOLIC PNL TOTAL CA: CPT

## 2023-12-19 PROCEDURE — 3600000014 HC SURGERY LEVEL 4 ADDTL 15MIN: Performed by: SURGERY

## 2023-12-19 PROCEDURE — 3700000000 HC ANESTHESIA ATTENDED CARE: Performed by: SURGERY

## 2023-12-19 PROCEDURE — 6370000000 HC RX 637 (ALT 250 FOR IP): Performed by: NURSE PRACTITIONER

## 2023-12-19 PROCEDURE — 7100000000 HC PACU RECOVERY - FIRST 15 MIN

## 2023-12-19 PROCEDURE — 6360000002 HC RX W HCPCS: Performed by: INTERNAL MEDICINE

## 2023-12-19 PROCEDURE — 99291 CRITICAL CARE FIRST HOUR: CPT | Performed by: INTERNAL MEDICINE

## 2023-12-19 PROCEDURE — 85025 COMPLETE CBC W/AUTO DIFF WBC: CPT

## 2023-12-19 PROCEDURE — 6370000000 HC RX 637 (ALT 250 FOR IP): Performed by: STUDENT IN AN ORGANIZED HEALTH CARE EDUCATION/TRAINING PROGRAM

## 2023-12-19 PROCEDURE — 6370000000 HC RX 637 (ALT 250 FOR IP): Performed by: INTERNAL MEDICINE

## 2023-12-19 PROCEDURE — 3700000001 HC ADD 15 MINUTES (ANESTHESIA): Performed by: SURGERY

## 2023-12-19 PROCEDURE — 6360000002 HC RX W HCPCS: Performed by: STUDENT IN AN ORGANIZED HEALTH CARE EDUCATION/TRAINING PROGRAM

## 2023-12-19 PROCEDURE — C8929 TTE W OR WO FOL WCON,DOPPLER: HCPCS

## 2023-12-19 RX ORDER — SODIUM CHLORIDE 0.9 % (FLUSH) 0.9 %
5-40 SYRINGE (ML) INJECTION EVERY 12 HOURS SCHEDULED
Status: DISCONTINUED | OUTPATIENT
Start: 2023-12-19 | End: 2023-12-19

## 2023-12-19 RX ORDER — ONDANSETRON 2 MG/ML
4 INJECTION INTRAMUSCULAR; INTRAVENOUS
Status: DISCONTINUED | OUTPATIENT
Start: 2023-12-19 | End: 2023-12-19

## 2023-12-19 RX ORDER — IPRATROPIUM BROMIDE AND ALBUTEROL SULFATE 2.5; .5 MG/3ML; MG/3ML
1 SOLUTION RESPIRATORY (INHALATION)
Status: DISCONTINUED | OUTPATIENT
Start: 2023-12-19 | End: 2023-12-19

## 2023-12-19 RX ORDER — MORPHINE SULFATE 2 MG/ML
1 INJECTION, SOLUTION INTRAMUSCULAR; INTRAVENOUS EVERY 5 MIN PRN
Status: DISCONTINUED | OUTPATIENT
Start: 2023-12-19 | End: 2023-12-19

## 2023-12-19 RX ORDER — MORPHINE SULFATE 2 MG/ML
2 INJECTION, SOLUTION INTRAMUSCULAR; INTRAVENOUS EVERY 5 MIN PRN
Status: DISCONTINUED | OUTPATIENT
Start: 2023-12-19 | End: 2023-12-19

## 2023-12-19 RX ORDER — SODIUM CHLORIDE 0.9 % (FLUSH) 0.9 %
5-40 SYRINGE (ML) INJECTION PRN
Status: DISCONTINUED | OUTPATIENT
Start: 2023-12-19 | End: 2023-12-19

## 2023-12-19 RX ORDER — SODIUM CHLORIDE 9 MG/ML
INJECTION, SOLUTION INTRAVENOUS PRN
Status: DISCONTINUED | OUTPATIENT
Start: 2023-12-19 | End: 2023-12-19

## 2023-12-19 RX ORDER — PROCHLORPERAZINE EDISYLATE 5 MG/ML
5 INJECTION INTRAMUSCULAR; INTRAVENOUS
Status: DISCONTINUED | OUTPATIENT
Start: 2023-12-19 | End: 2023-12-19

## 2023-12-19 RX ADMIN — SODIUM CHLORIDE, PRESERVATIVE FREE 10 ML: 5 INJECTION INTRAVENOUS at 09:00

## 2023-12-19 RX ADMIN — INSULIN GLARGINE 10 UNITS: 100 INJECTION, SOLUTION SUBCUTANEOUS at 20:14

## 2023-12-19 RX ADMIN — SODIUM CHLORIDE, PRESERVATIVE FREE 10 ML: 5 INJECTION INTRAVENOUS at 20:15

## 2023-12-19 RX ADMIN — METRONIDAZOLE 500 MG: 500 INJECTION, SOLUTION INTRAVENOUS at 15:37

## 2023-12-19 RX ADMIN — METRONIDAZOLE 500 MG: 500 INJECTION, SOLUTION INTRAVENOUS at 22:14

## 2023-12-19 RX ADMIN — INSULIN LISPRO 8 UNITS: 100 INJECTION, SOLUTION INTRAVENOUS; SUBCUTANEOUS at 20:14

## 2023-12-19 RX ADMIN — PIPERACILLIN AND TAZOBACTAM 4500 MG: 4; .5 INJECTION, POWDER, LYOPHILIZED, FOR SOLUTION INTRAVENOUS at 00:02

## 2023-12-19 RX ADMIN — METRONIDAZOLE 500 MG: 500 INJECTION, SOLUTION INTRAVENOUS at 05:36

## 2023-12-19 RX ADMIN — MORPHINE SULFATE 4 MG: 4 INJECTION, SOLUTION INTRAMUSCULAR; INTRAVENOUS at 15:38

## 2023-12-19 RX ADMIN — INSULIN LISPRO 8 UNITS: 100 INJECTION, SOLUTION INTRAVENOUS; SUBCUTANEOUS at 07:41

## 2023-12-19 RX ADMIN — SODIUM CHLORIDE: 9 INJECTION, SOLUTION INTRAVENOUS at 10:06

## 2023-12-19 RX ADMIN — PERFLUTREN 1.5 ML: 6.52 INJECTION, SUSPENSION INTRAVENOUS at 15:51

## 2023-12-19 RX ADMIN — OXYCODONE HYDROCHLORIDE 5 MG: 5 TABLET ORAL at 04:12

## 2023-12-19 RX ADMIN — MORPHINE SULFATE 2 MG: 2 INJECTION, SOLUTION INTRAMUSCULAR; INTRAVENOUS at 12:10

## 2023-12-19 RX ADMIN — INSULIN LISPRO 4 UNITS: 100 INJECTION, SOLUTION INTRAVENOUS; SUBCUTANEOUS at 16:40

## 2023-12-19 RX ADMIN — PIPERACILLIN AND TAZOBACTAM 4500 MG: 4; .5 INJECTION, POWDER, LYOPHILIZED, FOR SOLUTION INTRAVENOUS at 16:43

## 2023-12-19 RX ADMIN — INSULIN LISPRO 4 UNITS: 100 INJECTION, SOLUTION INTRAVENOUS; SUBCUTANEOUS at 00:08

## 2023-12-19 RX ADMIN — OXYCODONE HYDROCHLORIDE 5 MG: 5 TABLET ORAL at 20:18

## 2023-12-19 ASSESSMENT — PAIN - FUNCTIONAL ASSESSMENT
PAIN_FUNCTIONAL_ASSESSMENT: PREVENTS OR INTERFERES SOME ACTIVE ACTIVITIES AND ADLS
PAIN_FUNCTIONAL_ASSESSMENT: PREVENTS OR INTERFERES SOME ACTIVE ACTIVITIES AND ADLS

## 2023-12-19 ASSESSMENT — PAIN DESCRIPTION - ORIENTATION
ORIENTATION: RIGHT

## 2023-12-19 ASSESSMENT — PAIN DESCRIPTION - LOCATION
LOCATION: LEG
LOCATION: FOOT
LOCATION: LEG
LOCATION: LEG

## 2023-12-19 ASSESSMENT — PAIN SCALES - GENERAL
PAINLEVEL_OUTOF10: 9
PAINLEVEL_OUTOF10: 0
PAINLEVEL_OUTOF10: 5
PAINLEVEL_OUTOF10: 0
PAINLEVEL_OUTOF10: 6
PAINLEVEL_OUTOF10: 7
PAINLEVEL_OUTOF10: 0

## 2023-12-19 ASSESSMENT — PAIN DESCRIPTION - DESCRIPTORS
DESCRIPTORS: ACHING;CRAMPING;BURNING
DESCRIPTORS: ACHING
DESCRIPTORS: ACHING

## 2023-12-19 ASSESSMENT — PAIN DESCRIPTION - ONSET: ONSET: ON-GOING

## 2023-12-19 ASSESSMENT — PAIN DESCRIPTION - FREQUENCY: FREQUENCY: CONTINUOUS

## 2023-12-19 ASSESSMENT — PAIN DESCRIPTION - PAIN TYPE: TYPE: SURGICAL PAIN

## 2023-12-19 NOTE — BRIEF OP NOTE
Brief Postoperative Note      Patient: Chucky Gregory  YOB: 1970  MRN: [id number]    Date of Procedure: 12/19/2023    Pre-Op Diagnosis Codes:     * Gangrene of foot (HCC) [I96]    Post-Op Diagnosis: Same       Procedure(s):  RIGHT LEG BELOW KNEE GUILLOTINE AMPUTATION    Surgeon(s):  Uday Terrell MD    Assistant:  Surgical Assistant: Nicholas Flores    Anesthesia: General    Estimated Blood Loss (mL): less than 50     Complications: None    Specimens:   ID Type Source Tests Collected by Time Destination   1 : right leg abscess Tissue Tissue CULTURE, FUNGUS, CULTURE, TISSUE, CULTURE WITH SMEAR, ACID FAST BACILLIUS Uday Terrell MD 12/19/2023 1109    A : right foot Specimen Foot SURGICAL PATHOLOGY Uday Terrell MD 12/19/2023 1107        Implants:  * No implants in log *      Drains:   External Urinary Catheter (Active)   Site Assessment Intact;Pink 12/19/23 0800   Placement Replaced 12/19/23 0000   Securement Method Other (Comment) 12/18/23 1000   Perineal Care Yes 12/18/23 2000   Suction N/A 12/19/23 0000   Urine Color Yellow 12/19/23 0000   Urine Appearance Clear 12/18/23 0400   Output (mL) 125 mL 12/19/23 0800       Findings: pus into lateral distal calf soft tissue - no deep compartment extension, All muscle healthy      Electronically signed by Uday Terrell MD on 12/19/2023 at 11:55 AM

## 2023-12-20 LAB
ANION GAP SERPL CALCULATED.3IONS-SCNC: 6 MMOL/L (ref 3–16)
BACTERIA BLD CULT ORG #2: ABNORMAL
BACTERIA BLD CULT ORG #2: ABNORMAL
BACTERIA BLD CULT: ABNORMAL
BACTERIA BLD CULT: ABNORMAL
BASOPHILS # BLD: 0.1 K/UL (ref 0–0.2)
BASOPHILS NFR BLD: 0.4 %
BUN SERPL-MCNC: 16 MG/DL (ref 7–20)
CALCIUM SERPL-MCNC: 7.8 MG/DL (ref 8.3–10.6)
CHLORIDE SERPL-SCNC: 100 MMOL/L (ref 99–110)
CO2 SERPL-SCNC: 23 MMOL/L (ref 21–32)
CREAT SERPL-MCNC: 0.8 MG/DL (ref 0.9–1.3)
DEPRECATED RDW RBC AUTO: 13.9 % (ref 12.4–15.4)
EOSINOPHIL # BLD: 0.1 K/UL (ref 0–0.6)
EOSINOPHIL NFR BLD: 0.4 %
GFR SERPLBLD CREATININE-BSD FMLA CKD-EPI: >60 ML/MIN/{1.73_M2}
GLUCOSE BLD-MCNC: 140 MG/DL (ref 70–99)
GLUCOSE BLD-MCNC: 170 MG/DL (ref 70–99)
GLUCOSE BLD-MCNC: 184 MG/DL (ref 70–99)
GLUCOSE BLD-MCNC: 222 MG/DL (ref 70–99)
GLUCOSE BLD-MCNC: 223 MG/DL (ref 70–99)
GLUCOSE BLD-MCNC: 230 MG/DL (ref 70–99)
GLUCOSE BLD-MCNC: 231 MG/DL (ref 70–99)
GLUCOSE SERPL-MCNC: 216 MG/DL (ref 70–99)
HCT VFR BLD AUTO: 27.1 % (ref 40.5–52.5)
HGB BLD-MCNC: 9.1 G/DL (ref 13.5–17.5)
LYMPHOCYTES # BLD: 1.4 K/UL (ref 1–5.1)
LYMPHOCYTES NFR BLD: 8 %
MCH RBC QN AUTO: 30.3 PG (ref 26–34)
MCHC RBC AUTO-ENTMCNC: 33.7 G/DL (ref 31–36)
MCV RBC AUTO: 90 FL (ref 80–100)
MONOCYTES # BLD: 0.9 K/UL (ref 0–1.3)
MONOCYTES NFR BLD: 5.1 %
NEUTROPHILS # BLD: 15.2 K/UL (ref 1.7–7.7)
NEUTROPHILS NFR BLD: 86.1 %
ORGANISM: ABNORMAL
PERFORMED ON: ABNORMAL
PLATELET # BLD AUTO: 302 K/UL (ref 135–450)
PMV BLD AUTO: 6.8 FL (ref 5–10.5)
POTASSIUM SERPL-SCNC: 4 MMOL/L (ref 3.5–5.1)
RBC # BLD AUTO: 3.01 M/UL (ref 4.2–5.9)
SODIUM SERPL-SCNC: 129 MMOL/L (ref 136–145)
WBC # BLD AUTO: 17.6 K/UL (ref 4–11)

## 2023-12-20 PROCEDURE — 6360000002 HC RX W HCPCS: Performed by: STUDENT IN AN ORGANIZED HEALTH CARE EDUCATION/TRAINING PROGRAM

## 2023-12-20 PROCEDURE — 6370000000 HC RX 637 (ALT 250 FOR IP): Performed by: INTERNAL MEDICINE

## 2023-12-20 PROCEDURE — 2060000000 HC ICU INTERMEDIATE R&B

## 2023-12-20 PROCEDURE — 99024 POSTOP FOLLOW-UP VISIT: CPT | Performed by: SURGERY

## 2023-12-20 PROCEDURE — 6370000000 HC RX 637 (ALT 250 FOR IP): Performed by: NURSE PRACTITIONER

## 2023-12-20 PROCEDURE — 6360000002 HC RX W HCPCS: Performed by: INTERNAL MEDICINE

## 2023-12-20 PROCEDURE — 6370000000 HC RX 637 (ALT 250 FOR IP): Performed by: HOSPITALIST

## 2023-12-20 PROCEDURE — 80048 BASIC METABOLIC PNL TOTAL CA: CPT

## 2023-12-20 PROCEDURE — 97530 THERAPEUTIC ACTIVITIES: CPT

## 2023-12-20 PROCEDURE — 2580000003 HC RX 258: Performed by: INTERNAL MEDICINE

## 2023-12-20 PROCEDURE — 2580000003 HC RX 258: Performed by: SURGERY

## 2023-12-20 PROCEDURE — 97166 OT EVAL MOD COMPLEX 45 MIN: CPT

## 2023-12-20 PROCEDURE — 36415 COLL VENOUS BLD VENIPUNCTURE: CPT

## 2023-12-20 PROCEDURE — 85025 COMPLETE CBC W/AUTO DIFF WBC: CPT

## 2023-12-20 PROCEDURE — 99233 SBSQ HOSP IP/OBS HIGH 50: CPT | Performed by: INTERNAL MEDICINE

## 2023-12-20 PROCEDURE — 97162 PT EVAL MOD COMPLEX 30 MIN: CPT

## 2023-12-20 RX ORDER — INSULIN LISPRO 100 [IU]/ML
3 INJECTION, SOLUTION INTRAVENOUS; SUBCUTANEOUS
Status: DISCONTINUED | OUTPATIENT
Start: 2023-12-20 | End: 2024-01-02 | Stop reason: HOSPADM

## 2023-12-20 RX ORDER — INSULIN GLARGINE 100 [IU]/ML
15 INJECTION, SOLUTION SUBCUTANEOUS NIGHTLY
Status: DISCONTINUED | OUTPATIENT
Start: 2023-12-20 | End: 2023-12-21

## 2023-12-20 RX ORDER — SODIUM CHLORIDE 9 MG/ML
INJECTION, SOLUTION INTRAVENOUS CONTINUOUS
Status: DISCONTINUED | OUTPATIENT
Start: 2023-12-20 | End: 2023-12-20

## 2023-12-20 RX ADMIN — SODIUM CHLORIDE: 9 INJECTION, SOLUTION INTRAVENOUS at 06:39

## 2023-12-20 RX ADMIN — AMPICILLIN SODIUM AND SULBACTAM SODIUM 3000 MG: 2; 1 INJECTION, POWDER, FOR SOLUTION INTRAMUSCULAR; INTRAVENOUS at 16:30

## 2023-12-20 RX ADMIN — INSULIN LISPRO 3 UNITS: 100 INJECTION, SOLUTION INTRAVENOUS; SUBCUTANEOUS at 11:44

## 2023-12-20 RX ADMIN — OXYCODONE HYDROCHLORIDE 10 MG: 10 TABLET ORAL at 15:31

## 2023-12-20 RX ADMIN — AMPICILLIN SODIUM AND SULBACTAM SODIUM 3000 MG: 2; 1 INJECTION, POWDER, FOR SOLUTION INTRAMUSCULAR; INTRAVENOUS at 23:10

## 2023-12-20 RX ADMIN — SODIUM CHLORIDE: 9 INJECTION, SOLUTION INTRAVENOUS at 23:01

## 2023-12-20 RX ADMIN — PIPERACILLIN AND TAZOBACTAM 4500 MG: 4; .5 INJECTION, POWDER, LYOPHILIZED, FOR SOLUTION INTRAVENOUS at 08:14

## 2023-12-20 RX ADMIN — SODIUM CHLORIDE, PRESERVATIVE FREE 10 ML: 5 INJECTION INTRAVENOUS at 08:15

## 2023-12-20 RX ADMIN — OXYCODONE HYDROCHLORIDE 5 MG: 5 TABLET ORAL at 22:56

## 2023-12-20 RX ADMIN — INSULIN LISPRO 4 UNITS: 100 INJECTION, SOLUTION INTRAVENOUS; SUBCUTANEOUS at 16:27

## 2023-12-20 RX ADMIN — MORPHINE SULFATE 4 MG: 4 INJECTION, SOLUTION INTRAMUSCULAR; INTRAVENOUS at 06:10

## 2023-12-20 RX ADMIN — INSULIN LISPRO 4 UNITS: 100 INJECTION, SOLUTION INTRAVENOUS; SUBCUTANEOUS at 11:43

## 2023-12-20 RX ADMIN — INSULIN GLARGINE 15 UNITS: 100 INJECTION, SOLUTION SUBCUTANEOUS at 22:54

## 2023-12-20 RX ADMIN — METRONIDAZOLE 500 MG: 500 INJECTION, SOLUTION INTRAVENOUS at 23:03

## 2023-12-20 RX ADMIN — PIPERACILLIN AND TAZOBACTAM 4500 MG: 4; .5 INJECTION, POWDER, LYOPHILIZED, FOR SOLUTION INTRAVENOUS at 00:45

## 2023-12-20 RX ADMIN — INSULIN LISPRO 4 UNITS: 100 INJECTION, SOLUTION INTRAVENOUS; SUBCUTANEOUS at 04:14

## 2023-12-20 RX ADMIN — SODIUM CHLORIDE, PRESERVATIVE FREE 10 ML: 5 INJECTION INTRAVENOUS at 22:57

## 2023-12-20 RX ADMIN — OXYCODONE HYDROCHLORIDE 5 MG: 5 TABLET ORAL at 04:14

## 2023-12-20 RX ADMIN — METRONIDAZOLE 500 MG: 500 INJECTION, SOLUTION INTRAVENOUS at 14:21

## 2023-12-20 RX ADMIN — INSULIN LISPRO 3 UNITS: 100 INJECTION, SOLUTION INTRAVENOUS; SUBCUTANEOUS at 16:27

## 2023-12-20 RX ADMIN — INSULIN LISPRO 4 UNITS: 100 INJECTION, SOLUTION INTRAVENOUS; SUBCUTANEOUS at 00:51

## 2023-12-20 RX ADMIN — METRONIDAZOLE 500 MG: 500 INJECTION, SOLUTION INTRAVENOUS at 06:15

## 2023-12-20 ASSESSMENT — PAIN DESCRIPTION - LOCATION
LOCATION: LEG
LOCATION: FOOT;LEG
LOCATION: LEG

## 2023-12-20 ASSESSMENT — PAIN DESCRIPTION - DESCRIPTORS
DESCRIPTORS: THROBBING
DESCRIPTORS: ACHING;DISCOMFORT
DESCRIPTORS: ACHING
DESCRIPTORS: ACHING

## 2023-12-20 ASSESSMENT — PAIN DESCRIPTION - ORIENTATION
ORIENTATION: RIGHT

## 2023-12-20 ASSESSMENT — PAIN DESCRIPTION - ONSET
ONSET: ON-GOING

## 2023-12-20 ASSESSMENT — PAIN SCALES - GENERAL
PAINLEVEL_OUTOF10: 0
PAINLEVEL_OUTOF10: 2
PAINLEVEL_OUTOF10: 0
PAINLEVEL_OUTOF10: 1
PAINLEVEL_OUTOF10: 4
PAINLEVEL_OUTOF10: 0
PAINLEVEL_OUTOF10: 6
PAINLEVEL_OUTOF10: 10
PAINLEVEL_OUTOF10: 0
PAINLEVEL_OUTOF10: 8
PAINLEVEL_OUTOF10: 6

## 2023-12-20 ASSESSMENT — PAIN DESCRIPTION - FREQUENCY
FREQUENCY: CONTINUOUS

## 2023-12-20 ASSESSMENT — PAIN DESCRIPTION - PAIN TYPE
TYPE: SURGICAL PAIN

## 2023-12-20 ASSESSMENT — PAIN - FUNCTIONAL ASSESSMENT
PAIN_FUNCTIONAL_ASSESSMENT: PREVENTS OR INTERFERES SOME ACTIVE ACTIVITIES AND ADLS

## 2023-12-20 NOTE — OP NOTE
[facility]           [address] [place] [address]                                OPERATIVE REPORT    PATIENT NAME: MARIBELL PHILLIPS                        :        1970  MED REC NO:   [id number]                          ROOM:  ACCOUNT NO:   [id number]                           ADMIT DATE: 2023  PROVIDER:     Uday Terrell MD    DATE OF PROCEDURE:  2023    PREOPERATIVE DIAGNOSIS:  Diabetic wet gangrene, right foot  (nonsalvageable).    POSTOPERATIVE DIAGNOSIS:  Diabetic wet gangrene, right foot  (nonsalvageable).    OPERATION PERFORMED:  Right above ankle guillotine amputation with soft  tissue debridement.    SURGEON:  Udya Terrell MD    ANESTHESIA:  General endotracheal.    ESTIMATED BLOOD LOSS:  100 mL    HISTORY:  The patient is a 53-year-old diabetic who is well known not to  take good care of himself.  He presented to the hospital with right foot  pain and was noted to have wet gangrene of his foot involving nearly his  entire forefoot and ball of the foot.  Podiatry evaluated and felt there  was no role for salvage.  There was noted to be some gas on plain films  extending to the ankle level.  He was clinically stable and was on  antibiotics and it was recommended he be taken semi-urgently for a right  above ankle guillotine amputation to control the infection and prepare  for future conversion to a below-knee amputation.  The patient agreed  understanding risks, benefits, and other options and the fact that there  might be enough deep infection that would preclude eventual below-knee  amputation and require an above-knee amputation.    OPERATIVE PROCEDURE:  The patient brought to the operating placed and  placed on the operating table in supine position.  After adequate  induction of anesthesia, his right foot and calf were prepped and draped  in a sterile fashion.  A tourniquet was applied to the thigh above the  area of

## 2023-12-21 LAB
ANION GAP SERPL CALCULATED.3IONS-SCNC: 5 MMOL/L (ref 3–16)
BASOPHILS # BLD: 0.1 K/UL (ref 0–0.2)
BASOPHILS NFR BLD: 0.5 %
BUN SERPL-MCNC: 9 MG/DL (ref 7–20)
CALCIUM SERPL-MCNC: 8 MG/DL (ref 8.3–10.6)
CHLORIDE SERPL-SCNC: 102 MMOL/L (ref 99–110)
CO2 SERPL-SCNC: 26 MMOL/L (ref 21–32)
CREAT SERPL-MCNC: 0.7 MG/DL (ref 0.9–1.3)
DEPRECATED RDW RBC AUTO: 13.9 % (ref 12.4–15.4)
EOSINOPHIL # BLD: 0.1 K/UL (ref 0–0.6)
EOSINOPHIL NFR BLD: 1 %
GFR SERPLBLD CREATININE-BSD FMLA CKD-EPI: >60 ML/MIN/{1.73_M2}
GLUCOSE BLD-MCNC: 169 MG/DL (ref 70–99)
GLUCOSE BLD-MCNC: 172 MG/DL (ref 70–99)
GLUCOSE BLD-MCNC: 193 MG/DL (ref 70–99)
GLUCOSE BLD-MCNC: 226 MG/DL (ref 70–99)
GLUCOSE BLD-MCNC: 251 MG/DL (ref 70–99)
GLUCOSE SERPL-MCNC: 165 MG/DL (ref 70–99)
HCT VFR BLD AUTO: 27.2 % (ref 40.5–52.5)
HGB BLD-MCNC: 9.1 G/DL (ref 13.5–17.5)
LYMPHOCYTES # BLD: 1.7 K/UL (ref 1–5.1)
LYMPHOCYTES NFR BLD: 11.2 %
MCH RBC QN AUTO: 30.1 PG (ref 26–34)
MCHC RBC AUTO-ENTMCNC: 33.5 G/DL (ref 31–36)
MCV RBC AUTO: 89.9 FL (ref 80–100)
MONOCYTES # BLD: 0.8 K/UL (ref 0–1.3)
MONOCYTES NFR BLD: 5.4 %
NEUTROPHILS # BLD: 12.4 K/UL (ref 1.7–7.7)
NEUTROPHILS NFR BLD: 81.9 %
PERFORMED ON: ABNORMAL
PLATELET # BLD AUTO: 356 K/UL (ref 135–450)
PMV BLD AUTO: 6.4 FL (ref 5–10.5)
POTASSIUM SERPL-SCNC: 4.1 MMOL/L (ref 3.5–5.1)
RBC # BLD AUTO: 3.03 M/UL (ref 4.2–5.9)
SODIUM SERPL-SCNC: 133 MMOL/L (ref 136–145)
WBC # BLD AUTO: 15.1 K/UL (ref 4–11)

## 2023-12-21 PROCEDURE — 6360000002 HC RX W HCPCS: Performed by: STUDENT IN AN ORGANIZED HEALTH CARE EDUCATION/TRAINING PROGRAM

## 2023-12-21 PROCEDURE — 6370000000 HC RX 637 (ALT 250 FOR IP): Performed by: HOSPITALIST

## 2023-12-21 PROCEDURE — 97110 THERAPEUTIC EXERCISES: CPT

## 2023-12-21 PROCEDURE — 94760 N-INVAS EAR/PLS OXIMETRY 1: CPT

## 2023-12-21 PROCEDURE — 6360000002 HC RX W HCPCS: Performed by: INTERNAL MEDICINE

## 2023-12-21 PROCEDURE — 2580000003 HC RX 258: Performed by: INTERNAL MEDICINE

## 2023-12-21 PROCEDURE — 99233 SBSQ HOSP IP/OBS HIGH 50: CPT | Performed by: INTERNAL MEDICINE

## 2023-12-21 PROCEDURE — 80048 BASIC METABOLIC PNL TOTAL CA: CPT

## 2023-12-21 PROCEDURE — 6370000000 HC RX 637 (ALT 250 FOR IP): Performed by: NURSE PRACTITIONER

## 2023-12-21 PROCEDURE — 2060000000 HC ICU INTERMEDIATE R&B

## 2023-12-21 PROCEDURE — 99024 POSTOP FOLLOW-UP VISIT: CPT | Performed by: SURGERY

## 2023-12-21 PROCEDURE — 85025 COMPLETE CBC W/AUTO DIFF WBC: CPT

## 2023-12-21 PROCEDURE — 97530 THERAPEUTIC ACTIVITIES: CPT

## 2023-12-21 RX ORDER — INSULIN GLARGINE 100 [IU]/ML
20 INJECTION, SOLUTION SUBCUTANEOUS NIGHTLY
Status: DISCONTINUED | OUTPATIENT
Start: 2023-12-21 | End: 2024-01-02 | Stop reason: HOSPADM

## 2023-12-21 RX ADMIN — METRONIDAZOLE 500 MG: 500 INJECTION, SOLUTION INTRAVENOUS at 13:34

## 2023-12-21 RX ADMIN — INSULIN LISPRO 3 UNITS: 100 INJECTION, SOLUTION INTRAVENOUS; SUBCUTANEOUS at 18:39

## 2023-12-21 RX ADMIN — INSULIN LISPRO 8 UNITS: 100 INJECTION, SOLUTION INTRAVENOUS; SUBCUTANEOUS at 18:39

## 2023-12-21 RX ADMIN — INSULIN GLARGINE 20 UNITS: 100 INJECTION, SOLUTION SUBCUTANEOUS at 20:46

## 2023-12-21 RX ADMIN — AMPICILLIN SODIUM AND SULBACTAM SODIUM 3000 MG: 2; 1 INJECTION, POWDER, FOR SOLUTION INTRAMUSCULAR; INTRAVENOUS at 23:30

## 2023-12-21 RX ADMIN — MORPHINE SULFATE 4 MG: 4 INJECTION, SOLUTION INTRAMUSCULAR; INTRAVENOUS at 06:19

## 2023-12-21 RX ADMIN — AMPICILLIN SODIUM AND SULBACTAM SODIUM 3000 MG: 2; 1 INJECTION, POWDER, FOR SOLUTION INTRAMUSCULAR; INTRAVENOUS at 05:28

## 2023-12-21 RX ADMIN — AMPICILLIN SODIUM AND SULBACTAM SODIUM 3000 MG: 2; 1 INJECTION, POWDER, FOR SOLUTION INTRAMUSCULAR; INTRAVENOUS at 17:09

## 2023-12-21 RX ADMIN — INSULIN LISPRO 3 UNITS: 100 INJECTION, SOLUTION INTRAVENOUS; SUBCUTANEOUS at 12:41

## 2023-12-21 RX ADMIN — METRONIDAZOLE 500 MG: 500 INJECTION, SOLUTION INTRAVENOUS at 22:18

## 2023-12-21 RX ADMIN — INSULIN LISPRO 4 UNITS: 100 INJECTION, SOLUTION INTRAVENOUS; SUBCUTANEOUS at 20:47

## 2023-12-21 RX ADMIN — METRONIDAZOLE 500 MG: 500 INJECTION, SOLUTION INTRAVENOUS at 05:27

## 2023-12-21 RX ADMIN — INSULIN LISPRO 3 UNITS: 100 INJECTION, SOLUTION INTRAVENOUS; SUBCUTANEOUS at 08:39

## 2023-12-21 RX ADMIN — AMPICILLIN SODIUM AND SULBACTAM SODIUM 3000 MG: 2; 1 INJECTION, POWDER, FOR SOLUTION INTRAMUSCULAR; INTRAVENOUS at 11:09

## 2023-12-21 RX ADMIN — SODIUM CHLORIDE, PRESERVATIVE FREE 10 ML: 5 INJECTION INTRAVENOUS at 20:48

## 2023-12-21 ASSESSMENT — PAIN DESCRIPTION - ONSET: ONSET: ON-GOING

## 2023-12-21 ASSESSMENT — PAIN SCALES - GENERAL
PAINLEVEL_OUTOF10: 7
PAINLEVEL_OUTOF10: 4

## 2023-12-21 ASSESSMENT — PAIN DESCRIPTION - PAIN TYPE: TYPE: SURGICAL PAIN

## 2023-12-21 ASSESSMENT — PAIN DESCRIPTION - ORIENTATION: ORIENTATION: RIGHT

## 2023-12-21 ASSESSMENT — PAIN - FUNCTIONAL ASSESSMENT: PAIN_FUNCTIONAL_ASSESSMENT: PREVENTS OR INTERFERES WITH MANY ACTIVE NOT PASSIVE ACTIVITIES

## 2023-12-21 ASSESSMENT — PAIN DESCRIPTION - DESCRIPTORS: DESCRIPTORS: ACHING;THROBBING

## 2023-12-21 ASSESSMENT — PAIN DESCRIPTION - LOCATION: LOCATION: LEG;FOOT

## 2023-12-21 ASSESSMENT — PAIN DESCRIPTION - FREQUENCY: FREQUENCY: CONTINUOUS

## 2023-12-22 LAB
ANION GAP SERPL CALCULATED.3IONS-SCNC: 6 MMOL/L (ref 3–16)
BASOPHILS # BLD: 0.1 K/UL (ref 0–0.2)
BASOPHILS NFR BLD: 0.5 %
BUN SERPL-MCNC: 8 MG/DL (ref 7–20)
CALCIUM SERPL-MCNC: 8.4 MG/DL (ref 8.3–10.6)
CHLORIDE SERPL-SCNC: 102 MMOL/L (ref 99–110)
CO2 SERPL-SCNC: 29 MMOL/L (ref 21–32)
CREAT SERPL-MCNC: 0.7 MG/DL (ref 0.9–1.3)
DEPRECATED RDW RBC AUTO: 14 % (ref 12.4–15.4)
EOSINOPHIL # BLD: 0.1 K/UL (ref 0–0.6)
EOSINOPHIL NFR BLD: 0.9 %
GFR SERPLBLD CREATININE-BSD FMLA CKD-EPI: >60 ML/MIN/{1.73_M2}
GLUCOSE BLD-MCNC: 137 MG/DL (ref 70–99)
GLUCOSE BLD-MCNC: 147 MG/DL (ref 70–99)
GLUCOSE BLD-MCNC: 149 MG/DL (ref 70–99)
GLUCOSE BLD-MCNC: 158 MG/DL (ref 70–99)
GLUCOSE BLD-MCNC: 162 MG/DL (ref 70–99)
GLUCOSE BLD-MCNC: 310 MG/DL (ref 70–99)
GLUCOSE BLD-MCNC: 349 MG/DL (ref 70–99)
GLUCOSE SERPL-MCNC: 149 MG/DL (ref 70–99)
HCT VFR BLD AUTO: 28.4 % (ref 40.5–52.5)
HGB BLD-MCNC: 9.7 G/DL (ref 13.5–17.5)
LYMPHOCYTES # BLD: 1.6 K/UL (ref 1–5.1)
LYMPHOCYTES NFR BLD: 12.6 %
MCH RBC QN AUTO: 30.6 PG (ref 26–34)
MCHC RBC AUTO-ENTMCNC: 34.3 G/DL (ref 31–36)
MCV RBC AUTO: 89 FL (ref 80–100)
MONOCYTES # BLD: 0.9 K/UL (ref 0–1.3)
MONOCYTES NFR BLD: 7.4 %
NEUTROPHILS # BLD: 9.9 K/UL (ref 1.7–7.7)
NEUTROPHILS NFR BLD: 78.6 %
PERFORMED ON: ABNORMAL
PLATELET # BLD AUTO: 384 K/UL (ref 135–450)
PMV BLD AUTO: 6.6 FL (ref 5–10.5)
POTASSIUM SERPL-SCNC: 4 MMOL/L (ref 3.5–5.1)
RBC # BLD AUTO: 3.19 M/UL (ref 4.2–5.9)
SODIUM SERPL-SCNC: 137 MMOL/L (ref 136–145)
WBC # BLD AUTO: 12.6 K/UL (ref 4–11)

## 2023-12-22 PROCEDURE — 80048 BASIC METABOLIC PNL TOTAL CA: CPT

## 2023-12-22 PROCEDURE — 2580000003 HC RX 258: Performed by: INTERNAL MEDICINE

## 2023-12-22 PROCEDURE — 6370000000 HC RX 637 (ALT 250 FOR IP): Performed by: NURSE PRACTITIONER

## 2023-12-22 PROCEDURE — 0Y6H0Z1 DETACHMENT AT RIGHT LOWER LEG, HIGH, OPEN APPROACH: ICD-10-PCS | Performed by: SURGERY

## 2023-12-22 PROCEDURE — 6370000000 HC RX 637 (ALT 250 FOR IP): Performed by: HOSPITALIST

## 2023-12-22 PROCEDURE — 7100000000 HC PACU RECOVERY - FIRST 15 MIN: Performed by: SURGERY

## 2023-12-22 PROCEDURE — 6360000002 HC RX W HCPCS: Performed by: STUDENT IN AN ORGANIZED HEALTH CARE EDUCATION/TRAINING PROGRAM

## 2023-12-22 PROCEDURE — 3600000014 HC SURGERY LEVEL 4 ADDTL 15MIN: Performed by: SURGERY

## 2023-12-22 PROCEDURE — 3600000004 HC SURGERY LEVEL 4 BASE: Performed by: SURGERY

## 2023-12-22 PROCEDURE — 6360000002 HC RX W HCPCS: Performed by: INTERNAL MEDICINE

## 2023-12-22 PROCEDURE — 6360000002 HC RX W HCPCS: Performed by: SURGERY

## 2023-12-22 PROCEDURE — 7100000001 HC PACU RECOVERY - ADDTL 15 MIN: Performed by: SURGERY

## 2023-12-22 PROCEDURE — 2060000000 HC ICU INTERMEDIATE R&B

## 2023-12-22 PROCEDURE — 2580000003 HC RX 258: Performed by: SURGERY

## 2023-12-22 PROCEDURE — 3700000001 HC ADD 15 MINUTES (ANESTHESIA): Performed by: SURGERY

## 2023-12-22 PROCEDURE — 85025 COMPLETE CBC W/AUTO DIFF WBC: CPT

## 2023-12-22 PROCEDURE — 3700000000 HC ANESTHESIA ATTENDED CARE: Performed by: SURGERY

## 2023-12-22 PROCEDURE — 36415 COLL VENOUS BLD VENIPUNCTURE: CPT

## 2023-12-22 PROCEDURE — 6360000002 HC RX W HCPCS: Performed by: ANESTHESIOLOGY

## 2023-12-22 PROCEDURE — A4217 STERILE WATER/SALINE, 500 ML: HCPCS | Performed by: SURGERY

## 2023-12-22 PROCEDURE — 88307 TISSUE EXAM BY PATHOLOGIST: CPT

## 2023-12-22 PROCEDURE — 2709999900 HC NON-CHARGEABLE SUPPLY: Performed by: SURGERY

## 2023-12-22 RX ORDER — FENTANYL CITRATE 50 UG/ML
50 INJECTION, SOLUTION INTRAMUSCULAR; INTRAVENOUS EVERY 5 MIN PRN
Status: DISCONTINUED | OUTPATIENT
Start: 2023-12-22 | End: 2023-12-22 | Stop reason: CLARIF

## 2023-12-22 RX ORDER — SODIUM CHLORIDE 0.9 % (FLUSH) 0.9 %
5-40 SYRINGE (ML) INJECTION PRN
Status: DISCONTINUED | OUTPATIENT
Start: 2023-12-22 | End: 2023-12-22 | Stop reason: HOSPADM

## 2023-12-22 RX ORDER — FENTANYL CITRATE 0.05 MG/ML
25 INJECTION, SOLUTION INTRAMUSCULAR; INTRAVENOUS EVERY 5 MIN PRN
Status: DISCONTINUED | OUTPATIENT
Start: 2023-12-22 | End: 2023-12-22 | Stop reason: HOSPADM

## 2023-12-22 RX ORDER — FENTANYL CITRATE 0.05 MG/ML
50 INJECTION, SOLUTION INTRAMUSCULAR; INTRAVENOUS EVERY 5 MIN PRN
Status: DISCONTINUED | OUTPATIENT
Start: 2023-12-22 | End: 2023-12-22 | Stop reason: HOSPADM

## 2023-12-22 RX ORDER — MEPERIDINE HYDROCHLORIDE 25 MG/ML
12.5 INJECTION INTRAMUSCULAR; INTRAVENOUS; SUBCUTANEOUS EVERY 5 MIN PRN
Status: DISCONTINUED | OUTPATIENT
Start: 2023-12-22 | End: 2023-12-22 | Stop reason: HOSPADM

## 2023-12-22 RX ORDER — ONDANSETRON 2 MG/ML
4 INJECTION INTRAMUSCULAR; INTRAVENOUS
Status: DISCONTINUED | OUTPATIENT
Start: 2023-12-22 | End: 2023-12-22 | Stop reason: HOSPADM

## 2023-12-22 RX ORDER — SODIUM CHLORIDE 9 MG/ML
INJECTION, SOLUTION INTRAVENOUS PRN
Status: DISCONTINUED | OUTPATIENT
Start: 2023-12-22 | End: 2023-12-22 | Stop reason: HOSPADM

## 2023-12-22 RX ORDER — SODIUM CHLORIDE 0.9 % (FLUSH) 0.9 %
5-40 SYRINGE (ML) INJECTION EVERY 12 HOURS SCHEDULED
Status: DISCONTINUED | OUTPATIENT
Start: 2023-12-22 | End: 2023-12-22 | Stop reason: HOSPADM

## 2023-12-22 RX ORDER — KETOROLAC TROMETHAMINE 30 MG/ML
15 INJECTION, SOLUTION INTRAMUSCULAR; INTRAVENOUS EVERY 6 HOURS
Status: COMPLETED | OUTPATIENT
Start: 2023-12-22 | End: 2023-12-27

## 2023-12-22 RX ADMIN — KETOROLAC TROMETHAMINE 15 MG: 30 INJECTION, SOLUTION INTRAMUSCULAR; INTRAVENOUS at 15:35

## 2023-12-22 RX ADMIN — METRONIDAZOLE 500 MG: 500 INJECTION, SOLUTION INTRAVENOUS at 06:35

## 2023-12-22 RX ADMIN — INSULIN LISPRO 3 UNITS: 100 INJECTION, SOLUTION INTRAVENOUS; SUBCUTANEOUS at 18:44

## 2023-12-22 RX ADMIN — METRONIDAZOLE 500 MG: 500 INJECTION, SOLUTION INTRAVENOUS at 21:30

## 2023-12-22 RX ADMIN — KETOROLAC TROMETHAMINE 15 MG: 30 INJECTION, SOLUTION INTRAMUSCULAR; INTRAVENOUS at 20:03

## 2023-12-22 RX ADMIN — INSULIN LISPRO 3 UNITS: 100 INJECTION, SOLUTION INTRAVENOUS; SUBCUTANEOUS at 15:38

## 2023-12-22 RX ADMIN — FENTANYL CITRATE 50 MCG: 0.05 INJECTION, SOLUTION INTRAMUSCULAR; INTRAVENOUS at 12:53

## 2023-12-22 RX ADMIN — AMPICILLIN SODIUM AND SULBACTAM SODIUM 3000 MG: 2; 1 INJECTION, POWDER, FOR SOLUTION INTRAMUSCULAR; INTRAVENOUS at 10:57

## 2023-12-22 RX ADMIN — INSULIN LISPRO 12 UNITS: 100 INJECTION, SOLUTION INTRAVENOUS; SUBCUTANEOUS at 21:26

## 2023-12-22 RX ADMIN — METRONIDAZOLE 500 MG: 500 INJECTION, SOLUTION INTRAVENOUS at 15:35

## 2023-12-22 RX ADMIN — INSULIN LISPRO 12 UNITS: 100 INJECTION, SOLUTION INTRAVENOUS; SUBCUTANEOUS at 18:44

## 2023-12-22 RX ADMIN — SODIUM CHLORIDE, PRESERVATIVE FREE 10 ML: 5 INJECTION INTRAVENOUS at 09:02

## 2023-12-22 RX ADMIN — INSULIN GLARGINE 20 UNITS: 100 INJECTION, SOLUTION SUBCUTANEOUS at 21:26

## 2023-12-22 RX ADMIN — SODIUM CHLORIDE: 9 INJECTION, SOLUTION INTRAVENOUS at 15:35

## 2023-12-22 RX ADMIN — AMPICILLIN SODIUM AND SULBACTAM SODIUM 3000 MG: 2; 1 INJECTION, POWDER, FOR SOLUTION INTRAMUSCULAR; INTRAVENOUS at 05:39

## 2023-12-22 RX ADMIN — FENTANYL CITRATE 50 MCG: 0.05 INJECTION, SOLUTION INTRAMUSCULAR; INTRAVENOUS at 13:14

## 2023-12-22 RX ADMIN — AMPICILLIN SODIUM AND SULBACTAM SODIUM 3000 MG: 2; 1 INJECTION, POWDER, FOR SOLUTION INTRAMUSCULAR; INTRAVENOUS at 18:47

## 2023-12-22 RX ADMIN — SODIUM CHLORIDE, PRESERVATIVE FREE 10 ML: 5 INJECTION INTRAVENOUS at 20:04

## 2023-12-22 RX ADMIN — AMPICILLIN SODIUM AND SULBACTAM SODIUM 3000 MG: 2; 1 INJECTION, POWDER, FOR SOLUTION INTRAMUSCULAR; INTRAVENOUS at 22:35

## 2023-12-22 ASSESSMENT — PAIN DESCRIPTION - ORIENTATION
ORIENTATION: RIGHT

## 2023-12-22 ASSESSMENT — PAIN DESCRIPTION - FREQUENCY
FREQUENCY: CONTINUOUS
FREQUENCY: CONTINUOUS

## 2023-12-22 ASSESSMENT — PAIN DESCRIPTION - DESCRIPTORS
DESCRIPTORS: THROBBING;PRESSURE
DESCRIPTORS: ACHING;SORE
DESCRIPTORS: THROBBING;PRESSURE
DESCRIPTORS: THROBBING

## 2023-12-22 ASSESSMENT — PAIN - FUNCTIONAL ASSESSMENT
PAIN_FUNCTIONAL_ASSESSMENT: ACTIVITIES ARE NOT PREVENTED
PAIN_FUNCTIONAL_ASSESSMENT: 0-10
PAIN_FUNCTIONAL_ASSESSMENT: PREVENTS OR INTERFERES SOME ACTIVE ACTIVITIES AND ADLS

## 2023-12-22 ASSESSMENT — PAIN DESCRIPTION - LOCATION
LOCATION: LEG

## 2023-12-22 ASSESSMENT — PAIN SCALES - GENERAL
PAINLEVEL_OUTOF10: 6
PAINLEVEL_OUTOF10: 3
PAINLEVEL_OUTOF10: 4
PAINLEVEL_OUTOF10: 6
PAINLEVEL_OUTOF10: 9
PAINLEVEL_OUTOF10: 7

## 2023-12-22 ASSESSMENT — PAIN DESCRIPTION - PAIN TYPE
TYPE: SURGICAL PAIN
TYPE: SURGICAL PAIN

## 2023-12-22 ASSESSMENT — PAIN DESCRIPTION - ONSET
ONSET: ON-GOING
ONSET: ON-GOING

## 2023-12-22 NOTE — BRIEF OP NOTE
Brief Postoperative Note      Patient: Chucky Gregory  YOB: 1970  MRN: [id number]    Date of Procedure: 12/22/2023    Pre-Op Diagnosis Codes:     * Gangrene of right foot (HCC) [I96], s/p open R guillotine amputation    Post-Op Diagnosis: Same       Procedure(s):  RIGHT BELOW KNEE AMPUTATION    Surgeon(s):  Uday Terrell MD    Assistant:  Surgical Assistant: Kal Akins; Silvia Dumont    Anesthesia: General    Estimated Blood Loss (mL): less than 50     Complications: None    Specimens:   ID Type Source Tests Collected by Time Destination   A : A. RIGHT AMPUTATED LOWER LEG Specimen Leg SURGICAL PATHOLOGY Uday Terrell MD 12/22/2023 1136        Implants:  * No implants in log *      Drains:   Closed/Suction Drain Proximal;Right;Inferior Leg Bulb (Active)       External Urinary Catheter (Active)   Site Assessment Clean,dry & intact 12/22/23 0928   Placement Replaced 12/22/23 0928   Securement Method Other (Comment) 12/22/23 0928   Catheter Care Catheter/Wick replaced 12/22/23 0928   Perineal Care Yes 12/22/23 0928   Suction Other 12/22/23 0928   Urine Color Ankita 12/22/23 0205   Urine Appearance Clear 12/22/23 0205   Output (mL) 1000 mL 12/22/23 0205       Findings: as above      Electronically signed by Uday Terrell MD on 12/22/2023 at 12:21 PM

## 2023-12-23 LAB
ANION GAP SERPL CALCULATED.3IONS-SCNC: 4 MMOL/L (ref 3–16)
BACTERIA BLD CULT ORG #2: NORMAL
BACTERIA BLD CULT: NORMAL
BASOPHILS # BLD: 0.1 K/UL (ref 0–0.2)
BASOPHILS NFR BLD: 0.6 %
BUN SERPL-MCNC: 14 MG/DL (ref 7–20)
CALCIUM SERPL-MCNC: 7.8 MG/DL (ref 8.3–10.6)
CHLORIDE SERPL-SCNC: 100 MMOL/L (ref 99–110)
CO2 SERPL-SCNC: 29 MMOL/L (ref 21–32)
CREAT SERPL-MCNC: 0.7 MG/DL (ref 0.9–1.3)
DEPRECATED RDW RBC AUTO: 13.6 % (ref 12.4–15.4)
EOSINOPHIL # BLD: 0.1 K/UL (ref 0–0.6)
EOSINOPHIL NFR BLD: 0.7 %
GFR SERPLBLD CREATININE-BSD FMLA CKD-EPI: >60 ML/MIN/{1.73_M2}
GLUCOSE BLD-MCNC: 156 MG/DL (ref 70–99)
GLUCOSE BLD-MCNC: 161 MG/DL (ref 70–99)
GLUCOSE BLD-MCNC: 171 MG/DL (ref 70–99)
GLUCOSE BLD-MCNC: 194 MG/DL (ref 70–99)
GLUCOSE BLD-MCNC: 204 MG/DL (ref 70–99)
GLUCOSE BLD-MCNC: 252 MG/DL (ref 70–99)
GLUCOSE SERPL-MCNC: 156 MG/DL (ref 70–99)
HCT VFR BLD AUTO: 28.4 % (ref 40.5–52.5)
HGB BLD-MCNC: 9.7 G/DL (ref 13.5–17.5)
LYMPHOCYTES # BLD: 2.1 K/UL (ref 1–5.1)
LYMPHOCYTES NFR BLD: 14.8 %
MCH RBC QN AUTO: 30.4 PG (ref 26–34)
MCHC RBC AUTO-ENTMCNC: 34.3 G/DL (ref 31–36)
MCV RBC AUTO: 88.5 FL (ref 80–100)
MONOCYTES # BLD: 1.1 K/UL (ref 0–1.3)
MONOCYTES NFR BLD: 7.4 %
NEUTROPHILS # BLD: 10.9 K/UL (ref 1.7–7.7)
NEUTROPHILS NFR BLD: 76.5 %
PERFORMED ON: ABNORMAL
PLATELET # BLD AUTO: 372 K/UL (ref 135–450)
PMV BLD AUTO: 6.4 FL (ref 5–10.5)
POTASSIUM SERPL-SCNC: 3.9 MMOL/L (ref 3.5–5.1)
RBC # BLD AUTO: 3.21 M/UL (ref 4.2–5.9)
SODIUM SERPL-SCNC: 133 MMOL/L (ref 136–145)
WBC # BLD AUTO: 14.3 K/UL (ref 4–11)

## 2023-12-23 PROCEDURE — 6360000002 HC RX W HCPCS: Performed by: STUDENT IN AN ORGANIZED HEALTH CARE EDUCATION/TRAINING PROGRAM

## 2023-12-23 PROCEDURE — 6370000000 HC RX 637 (ALT 250 FOR IP): Performed by: HOSPITALIST

## 2023-12-23 PROCEDURE — 6370000000 HC RX 637 (ALT 250 FOR IP): Performed by: INTERNAL MEDICINE

## 2023-12-23 PROCEDURE — 2580000003 HC RX 258: Performed by: INTERNAL MEDICINE

## 2023-12-23 PROCEDURE — 85025 COMPLETE CBC W/AUTO DIFF WBC: CPT

## 2023-12-23 PROCEDURE — 6370000000 HC RX 637 (ALT 250 FOR IP): Performed by: NURSE PRACTITIONER

## 2023-12-23 PROCEDURE — 99024 POSTOP FOLLOW-UP VISIT: CPT | Performed by: SURGERY

## 2023-12-23 PROCEDURE — 94760 N-INVAS EAR/PLS OXIMETRY 1: CPT

## 2023-12-23 PROCEDURE — 97530 THERAPEUTIC ACTIVITIES: CPT

## 2023-12-23 PROCEDURE — 2060000000 HC ICU INTERMEDIATE R&B

## 2023-12-23 PROCEDURE — 6360000002 HC RX W HCPCS: Performed by: SURGERY

## 2023-12-23 PROCEDURE — 6360000002 HC RX W HCPCS: Performed by: INTERNAL MEDICINE

## 2023-12-23 PROCEDURE — 80048 BASIC METABOLIC PNL TOTAL CA: CPT

## 2023-12-23 PROCEDURE — 36415 COLL VENOUS BLD VENIPUNCTURE: CPT

## 2023-12-23 RX ORDER — LOPERAMIDE HYDROCHLORIDE 2 MG/1
2 CAPSULE ORAL 4 TIMES DAILY PRN
Status: DISCONTINUED | OUTPATIENT
Start: 2023-12-23 | End: 2024-01-02 | Stop reason: HOSPADM

## 2023-12-23 RX ADMIN — METRONIDAZOLE 500 MG: 500 INJECTION, SOLUTION INTRAVENOUS at 21:01

## 2023-12-23 RX ADMIN — METRONIDAZOLE 500 MG: 500 INJECTION, SOLUTION INTRAVENOUS at 13:45

## 2023-12-23 RX ADMIN — AMPICILLIN SODIUM AND SULBACTAM SODIUM 3000 MG: 2; 1 INJECTION, POWDER, FOR SOLUTION INTRAMUSCULAR; INTRAVENOUS at 18:31

## 2023-12-23 RX ADMIN — SODIUM CHLORIDE: 9 INJECTION, SOLUTION INTRAVENOUS at 13:44

## 2023-12-23 RX ADMIN — KETOROLAC TROMETHAMINE 15 MG: 30 INJECTION, SOLUTION INTRAMUSCULAR; INTRAVENOUS at 00:54

## 2023-12-23 RX ADMIN — INSULIN LISPRO 4 UNITS: 100 INJECTION, SOLUTION INTRAVENOUS; SUBCUTANEOUS at 13:36

## 2023-12-23 RX ADMIN — KETOROLAC TROMETHAMINE 15 MG: 30 INJECTION, SOLUTION INTRAMUSCULAR; INTRAVENOUS at 13:36

## 2023-12-23 RX ADMIN — SODIUM CHLORIDE: 9 INJECTION, SOLUTION INTRAVENOUS at 18:31

## 2023-12-23 RX ADMIN — AMPICILLIN SODIUM AND SULBACTAM SODIUM 3000 MG: 2; 1 INJECTION, POWDER, FOR SOLUTION INTRAMUSCULAR; INTRAVENOUS at 23:20

## 2023-12-23 RX ADMIN — METRONIDAZOLE 500 MG: 500 INJECTION, SOLUTION INTRAVENOUS at 05:51

## 2023-12-23 RX ADMIN — SODIUM CHLORIDE: 9 INJECTION, SOLUTION INTRAVENOUS at 11:03

## 2023-12-23 RX ADMIN — INSULIN LISPRO 3 UNITS: 100 INJECTION, SOLUTION INTRAVENOUS; SUBCUTANEOUS at 18:27

## 2023-12-23 RX ADMIN — KETOROLAC TROMETHAMINE 15 MG: 30 INJECTION, SOLUTION INTRAMUSCULAR; INTRAVENOUS at 18:27

## 2023-12-23 RX ADMIN — INSULIN GLARGINE 20 UNITS: 100 INJECTION, SOLUTION SUBCUTANEOUS at 20:57

## 2023-12-23 RX ADMIN — KETOROLAC TROMETHAMINE 15 MG: 30 INJECTION, SOLUTION INTRAMUSCULAR; INTRAVENOUS at 06:35

## 2023-12-23 RX ADMIN — OXYCODONE HYDROCHLORIDE 5 MG: 5 TABLET ORAL at 11:17

## 2023-12-23 RX ADMIN — LOPERAMIDE HYDROCHLORIDE 2 MG: 2 CAPSULE ORAL at 21:02

## 2023-12-23 RX ADMIN — AMPICILLIN SODIUM AND SULBACTAM SODIUM 3000 MG: 2; 1 INJECTION, POWDER, FOR SOLUTION INTRAMUSCULAR; INTRAVENOUS at 11:04

## 2023-12-23 RX ADMIN — INSULIN LISPRO 8 UNITS: 100 INJECTION, SOLUTION INTRAVENOUS; SUBCUTANEOUS at 00:52

## 2023-12-23 RX ADMIN — INSULIN LISPRO 3 UNITS: 100 INJECTION, SOLUTION INTRAVENOUS; SUBCUTANEOUS at 13:36

## 2023-12-23 RX ADMIN — SODIUM CHLORIDE, PRESERVATIVE FREE 10 ML: 5 INJECTION INTRAVENOUS at 10:55

## 2023-12-23 RX ADMIN — AMPICILLIN SODIUM AND SULBACTAM SODIUM 3000 MG: 2; 1 INJECTION, POWDER, FOR SOLUTION INTRAMUSCULAR; INTRAVENOUS at 04:45

## 2023-12-23 RX ADMIN — INSULIN LISPRO 3 UNITS: 100 INJECTION, SOLUTION INTRAVENOUS; SUBCUTANEOUS at 10:55

## 2023-12-23 ASSESSMENT — PAIN DESCRIPTION - DESCRIPTORS
DESCRIPTORS: ACHING;SORE
DESCRIPTORS: SORE;ACHING

## 2023-12-23 ASSESSMENT — PAIN SCALES - GENERAL
PAINLEVEL_OUTOF10: 5
PAINLEVEL_OUTOF10: 0
PAINLEVEL_OUTOF10: 7
PAINLEVEL_OUTOF10: 3
PAINLEVEL_OUTOF10: 7

## 2023-12-23 ASSESSMENT — PAIN DESCRIPTION - LOCATION
LOCATION: LEG

## 2023-12-23 ASSESSMENT — PAIN - FUNCTIONAL ASSESSMENT
PAIN_FUNCTIONAL_ASSESSMENT: PREVENTS OR INTERFERES SOME ACTIVE ACTIVITIES AND ADLS
PAIN_FUNCTIONAL_ASSESSMENT: ACTIVITIES ARE NOT PREVENTED
PAIN_FUNCTIONAL_ASSESSMENT: ACTIVITIES ARE NOT PREVENTED

## 2023-12-23 ASSESSMENT — PAIN DESCRIPTION - ORIENTATION
ORIENTATION: RIGHT

## 2023-12-23 NOTE — OP NOTE
[facility]           [address] [place] [address]                                OPERATIVE REPORT    PATIENT NAME: MARIBELL PHILLIPS                        :        1970  MED REC NO:   [id number]                          ROOM:  ACCOUNT NO:   [id number]                           ADMIT DATE: 2023  PROVIDER:     Uday Terrell MD    DATE OF PROCEDURE:  2023    PREOPERATIVE DIAGNOSIS:  Open right above-ankle guillotine amputation.    POSTOPERATIVE DIAGNOSIS:  Open right above-ankle guillotine amputation.    PROCEDURE:  Revision of amputation to right below-knee amputation.    SURGEON:  Uday Terrell MD    ANESTHESIA:  General endotracheal.    ESTIMATED BLOOD LOSS:  50 mL.    HISTORY:  The patient is a 53-year-old diabetic, who presented earlier  this week with sepsis from a severe diabetic foot infection with an  unsalvageable foot.  He underwent an open guillotine amputation just  above his ankle with drainage of significant ascending infection.  He  defervesced and his wound rapidly improved and it was recommended he  undergo revision to a formal below-knee amputation.  He agreed  understanding risks, benefits, and other options.    TECHNIQUE:  The patient was brought to the operating room, placed on the  operating table in supine position.  After adequate induction of  anesthesia, the right leg and wound were prepped with Betadine soap and  paint and then draped.  Tourniquet was applied above the sterile field  and was inflated to 325 mmHg.  The amputation level was marked on the  skin approximately a hand's breadth below the tibial plateau with a long  posterior flap.  Incision was made along the course of this marking and  carried through subcutaneous tissue and fascia circumferentially.   Muscles of the anterior compartment were divided sharply down to the  anterior tibial vascular bundle, which was mobilized and divided

## 2023-12-24 LAB
ANION GAP SERPL CALCULATED.3IONS-SCNC: 7 MMOL/L (ref 3–16)
BACTERIA SPEC AEROBE CULT: ABNORMAL
BACTERIA SPEC ANAEROBE CULT: ABNORMAL
BASOPHILS # BLD: 0.1 K/UL (ref 0–0.2)
BASOPHILS NFR BLD: 0.5 %
BUN SERPL-MCNC: 14 MG/DL (ref 7–20)
CALCIUM SERPL-MCNC: 8 MG/DL (ref 8.3–10.6)
CHLORIDE SERPL-SCNC: 103 MMOL/L (ref 99–110)
CO2 SERPL-SCNC: 28 MMOL/L (ref 21–32)
CREAT SERPL-MCNC: 0.8 MG/DL (ref 0.9–1.3)
DEPRECATED RDW RBC AUTO: 13.8 % (ref 12.4–15.4)
EOSINOPHIL # BLD: 0.2 K/UL (ref 0–0.6)
EOSINOPHIL NFR BLD: 1.6 %
GFR SERPLBLD CREATININE-BSD FMLA CKD-EPI: >60 ML/MIN/{1.73_M2}
GLUCOSE BLD-MCNC: 102 MG/DL (ref 70–99)
GLUCOSE BLD-MCNC: 146 MG/DL (ref 70–99)
GLUCOSE BLD-MCNC: 152 MG/DL (ref 70–99)
GLUCOSE BLD-MCNC: 182 MG/DL (ref 70–99)
GLUCOSE BLD-MCNC: 191 MG/DL (ref 70–99)
GLUCOSE BLD-MCNC: 218 MG/DL (ref 70–99)
GLUCOSE SERPL-MCNC: 143 MG/DL (ref 70–99)
GRAM STN SPEC: ABNORMAL
HCT VFR BLD AUTO: 28.4 % (ref 40.5–52.5)
HGB BLD-MCNC: 9.9 G/DL (ref 13.5–17.5)
LYMPHOCYTES # BLD: 2.4 K/UL (ref 1–5.1)
LYMPHOCYTES NFR BLD: 22 %
MCH RBC QN AUTO: 31.2 PG (ref 26–34)
MCHC RBC AUTO-ENTMCNC: 34.9 G/DL (ref 31–36)
MCV RBC AUTO: 89.4 FL (ref 80–100)
MONOCYTES # BLD: 0.8 K/UL (ref 0–1.3)
MONOCYTES NFR BLD: 7 %
NEUTROPHILS # BLD: 7.5 K/UL (ref 1.7–7.7)
NEUTROPHILS NFR BLD: 68.9 %
ORGANISM: ABNORMAL
ORGANISM: ABNORMAL
PERFORMED ON: ABNORMAL
PLATELET # BLD AUTO: 389 K/UL (ref 135–450)
PMV BLD AUTO: 6.3 FL (ref 5–10.5)
POTASSIUM SERPL-SCNC: 4.2 MMOL/L (ref 3.5–5.1)
RBC # BLD AUTO: 3.17 M/UL (ref 4.2–5.9)
SODIUM SERPL-SCNC: 138 MMOL/L (ref 136–145)
WBC # BLD AUTO: 10.9 K/UL (ref 4–11)

## 2023-12-24 PROCEDURE — 6360000002 HC RX W HCPCS: Performed by: STUDENT IN AN ORGANIZED HEALTH CARE EDUCATION/TRAINING PROGRAM

## 2023-12-24 PROCEDURE — 99024 POSTOP FOLLOW-UP VISIT: CPT | Performed by: SURGERY

## 2023-12-24 PROCEDURE — 6360000002 HC RX W HCPCS: Performed by: INTERNAL MEDICINE

## 2023-12-24 PROCEDURE — 6360000002 HC RX W HCPCS: Performed by: SURGERY

## 2023-12-24 PROCEDURE — 6370000000 HC RX 637 (ALT 250 FOR IP): Performed by: HOSPITALIST

## 2023-12-24 PROCEDURE — 2580000003 HC RX 258: Performed by: INTERNAL MEDICINE

## 2023-12-24 PROCEDURE — 36415 COLL VENOUS BLD VENIPUNCTURE: CPT

## 2023-12-24 PROCEDURE — 6370000000 HC RX 637 (ALT 250 FOR IP): Performed by: NURSE PRACTITIONER

## 2023-12-24 PROCEDURE — 94760 N-INVAS EAR/PLS OXIMETRY 1: CPT

## 2023-12-24 PROCEDURE — 6370000000 HC RX 637 (ALT 250 FOR IP): Performed by: INTERNAL MEDICINE

## 2023-12-24 PROCEDURE — 85025 COMPLETE CBC W/AUTO DIFF WBC: CPT

## 2023-12-24 PROCEDURE — 80048 BASIC METABOLIC PNL TOTAL CA: CPT

## 2023-12-24 PROCEDURE — 2060000000 HC ICU INTERMEDIATE R&B

## 2023-12-24 RX ADMIN — KETOROLAC TROMETHAMINE 15 MG: 30 INJECTION, SOLUTION INTRAMUSCULAR; INTRAVENOUS at 13:08

## 2023-12-24 RX ADMIN — KETOROLAC TROMETHAMINE 15 MG: 30 INJECTION, SOLUTION INTRAMUSCULAR; INTRAVENOUS at 19:01

## 2023-12-24 RX ADMIN — MORPHINE SULFATE 4 MG: 4 INJECTION, SOLUTION INTRAMUSCULAR; INTRAVENOUS at 08:16

## 2023-12-24 RX ADMIN — METRONIDAZOLE 500 MG: 500 INJECTION, SOLUTION INTRAVENOUS at 06:12

## 2023-12-24 RX ADMIN — MORPHINE SULFATE 4 MG: 4 INJECTION, SOLUTION INTRAMUSCULAR; INTRAVENOUS at 05:33

## 2023-12-24 RX ADMIN — METRONIDAZOLE 500 MG: 500 INJECTION, SOLUTION INTRAVENOUS at 15:07

## 2023-12-24 RX ADMIN — SODIUM CHLORIDE: 9 INJECTION, SOLUTION INTRAVENOUS at 05:37

## 2023-12-24 RX ADMIN — SODIUM CHLORIDE: 9 INJECTION, SOLUTION INTRAVENOUS at 23:59

## 2023-12-24 RX ADMIN — AMPICILLIN SODIUM AND SULBACTAM SODIUM 3000 MG: 2; 1 INJECTION, POWDER, FOR SOLUTION INTRAMUSCULAR; INTRAVENOUS at 11:54

## 2023-12-24 RX ADMIN — INSULIN LISPRO 4 UNITS: 100 INJECTION, SOLUTION INTRAVENOUS; SUBCUTANEOUS at 06:14

## 2023-12-24 RX ADMIN — KETOROLAC TROMETHAMINE 15 MG: 30 INJECTION, SOLUTION INTRAMUSCULAR; INTRAVENOUS at 01:35

## 2023-12-24 RX ADMIN — OXYCODONE HYDROCHLORIDE 5 MG: 5 TABLET ORAL at 21:15

## 2023-12-24 RX ADMIN — AMPICILLIN SODIUM AND SULBACTAM SODIUM 3000 MG: 2; 1 INJECTION, POWDER, FOR SOLUTION INTRAMUSCULAR; INTRAVENOUS at 05:38

## 2023-12-24 RX ADMIN — AMPICILLIN SODIUM AND SULBACTAM SODIUM 3000 MG: 2; 1 INJECTION, POWDER, FOR SOLUTION INTRAMUSCULAR; INTRAVENOUS at 19:05

## 2023-12-24 RX ADMIN — KETOROLAC TROMETHAMINE 15 MG: 30 INJECTION, SOLUTION INTRAMUSCULAR; INTRAVENOUS at 07:07

## 2023-12-24 RX ADMIN — INSULIN GLARGINE 20 UNITS: 100 INJECTION, SOLUTION SUBCUTANEOUS at 20:58

## 2023-12-24 RX ADMIN — SODIUM CHLORIDE, PRESERVATIVE FREE 10 ML: 5 INJECTION INTRAVENOUS at 21:01

## 2023-12-24 RX ADMIN — SODIUM CHLORIDE, PRESERVATIVE FREE 10 ML: 5 INJECTION INTRAVENOUS at 08:17

## 2023-12-24 ASSESSMENT — PAIN SCALES - GENERAL
PAINLEVEL_OUTOF10: 8
PAINLEVEL_OUTOF10: 0
PAINLEVEL_OUTOF10: 4
PAINLEVEL_OUTOF10: 8
PAINLEVEL_OUTOF10: 3

## 2023-12-24 ASSESSMENT — PAIN DESCRIPTION - DESCRIPTORS
DESCRIPTORS: THROBBING
DESCRIPTORS: DISCOMFORT
DESCRIPTORS: DISCOMFORT
DESCRIPTORS: THROBBING
DESCRIPTORS: SHARP;DISCOMFORT

## 2023-12-24 ASSESSMENT — PAIN DESCRIPTION - ORIENTATION
ORIENTATION: RIGHT

## 2023-12-24 ASSESSMENT — PAIN DESCRIPTION - LOCATION
LOCATION: LEG
LOCATION: KNEE;LEG

## 2023-12-24 ASSESSMENT — PAIN - FUNCTIONAL ASSESSMENT: PAIN_FUNCTIONAL_ASSESSMENT: ACTIVITIES ARE NOT PREVENTED

## 2023-12-25 LAB
ANION GAP SERPL CALCULATED.3IONS-SCNC: 4 MMOL/L (ref 3–16)
BUN SERPL-MCNC: 13 MG/DL (ref 7–20)
CALCIUM SERPL-MCNC: 8.3 MG/DL (ref 8.3–10.6)
CHLORIDE SERPL-SCNC: 105 MMOL/L (ref 99–110)
CO2 SERPL-SCNC: 29 MMOL/L (ref 21–32)
CREAT SERPL-MCNC: 0.7 MG/DL (ref 0.9–1.3)
DEPRECATED RDW RBC AUTO: 14 % (ref 12.4–15.4)
GFR SERPLBLD CREATININE-BSD FMLA CKD-EPI: >60 ML/MIN/{1.73_M2}
GLUCOSE BLD-MCNC: 131 MG/DL (ref 70–99)
GLUCOSE BLD-MCNC: 138 MG/DL (ref 70–99)
GLUCOSE BLD-MCNC: 150 MG/DL (ref 70–99)
GLUCOSE BLD-MCNC: 168 MG/DL (ref 70–99)
GLUCOSE BLD-MCNC: 197 MG/DL (ref 70–99)
GLUCOSE BLD-MCNC: 208 MG/DL (ref 70–99)
GLUCOSE BLD-MCNC: 217 MG/DL (ref 70–99)
GLUCOSE SERPL-MCNC: 139 MG/DL (ref 70–99)
HCT VFR BLD AUTO: 27.7 % (ref 40.5–52.5)
HGB BLD-MCNC: 9.3 G/DL (ref 13.5–17.5)
MCH RBC QN AUTO: 30 PG (ref 26–34)
MCHC RBC AUTO-ENTMCNC: 33.6 G/DL (ref 31–36)
MCV RBC AUTO: 89.3 FL (ref 80–100)
PERFORMED ON: ABNORMAL
PLATELET # BLD AUTO: 404 K/UL (ref 135–450)
PMV BLD AUTO: 6.3 FL (ref 5–10.5)
POTASSIUM SERPL-SCNC: 4.1 MMOL/L (ref 3.5–5.1)
RBC # BLD AUTO: 3.1 M/UL (ref 4.2–5.9)
SODIUM SERPL-SCNC: 138 MMOL/L (ref 136–145)
WBC # BLD AUTO: 10.7 K/UL (ref 4–11)

## 2023-12-25 PROCEDURE — 6360000002 HC RX W HCPCS: Performed by: INTERNAL MEDICINE

## 2023-12-25 PROCEDURE — 2580000003 HC RX 258: Performed by: INTERNAL MEDICINE

## 2023-12-25 PROCEDURE — 80048 BASIC METABOLIC PNL TOTAL CA: CPT

## 2023-12-25 PROCEDURE — 36415 COLL VENOUS BLD VENIPUNCTURE: CPT

## 2023-12-25 PROCEDURE — 6360000002 HC RX W HCPCS: Performed by: STUDENT IN AN ORGANIZED HEALTH CARE EDUCATION/TRAINING PROGRAM

## 2023-12-25 PROCEDURE — 2060000000 HC ICU INTERMEDIATE R&B

## 2023-12-25 PROCEDURE — 6370000000 HC RX 637 (ALT 250 FOR IP): Performed by: INTERNAL MEDICINE

## 2023-12-25 PROCEDURE — 6370000000 HC RX 637 (ALT 250 FOR IP): Performed by: HOSPITALIST

## 2023-12-25 PROCEDURE — 6360000002 HC RX W HCPCS: Performed by: SURGERY

## 2023-12-25 PROCEDURE — 6370000000 HC RX 637 (ALT 250 FOR IP): Performed by: NURSE PRACTITIONER

## 2023-12-25 PROCEDURE — 85027 COMPLETE CBC AUTOMATED: CPT

## 2023-12-25 PROCEDURE — 94760 N-INVAS EAR/PLS OXIMETRY 1: CPT

## 2023-12-25 RX ADMIN — INSULIN LISPRO 4 UNITS: 100 INJECTION, SOLUTION INTRAVENOUS; SUBCUTANEOUS at 14:39

## 2023-12-25 RX ADMIN — METRONIDAZOLE 500 MG: 500 INJECTION, SOLUTION INTRAVENOUS at 06:18

## 2023-12-25 RX ADMIN — KETOROLAC TROMETHAMINE 15 MG: 30 INJECTION, SOLUTION INTRAMUSCULAR; INTRAVENOUS at 14:39

## 2023-12-25 RX ADMIN — OXYCODONE HYDROCHLORIDE 10 MG: 10 TABLET ORAL at 16:28

## 2023-12-25 RX ADMIN — KETOROLAC TROMETHAMINE 15 MG: 30 INJECTION, SOLUTION INTRAMUSCULAR; INTRAVENOUS at 09:43

## 2023-12-25 RX ADMIN — SODIUM CHLORIDE: 9 INJECTION, SOLUTION INTRAVENOUS at 05:35

## 2023-12-25 RX ADMIN — AMPICILLIN SODIUM AND SULBACTAM SODIUM 3000 MG: 2; 1 INJECTION, POWDER, FOR SOLUTION INTRAMUSCULAR; INTRAVENOUS at 05:35

## 2023-12-25 RX ADMIN — SODIUM CHLORIDE, PRESERVATIVE FREE 10 ML: 5 INJECTION INTRAVENOUS at 21:52

## 2023-12-25 RX ADMIN — KETOROLAC TROMETHAMINE 15 MG: 30 INJECTION, SOLUTION INTRAMUSCULAR; INTRAVENOUS at 03:20

## 2023-12-25 RX ADMIN — AMPICILLIN SODIUM AND SULBACTAM SODIUM 3000 MG: 2; 1 INJECTION, POWDER, FOR SOLUTION INTRAMUSCULAR; INTRAVENOUS at 11:55

## 2023-12-25 RX ADMIN — AMPICILLIN SODIUM AND SULBACTAM SODIUM 3000 MG: 2; 1 INJECTION, POWDER, FOR SOLUTION INTRAMUSCULAR; INTRAVENOUS at 16:28

## 2023-12-25 RX ADMIN — METRONIDAZOLE 500 MG: 500 INJECTION, SOLUTION INTRAVENOUS at 00:00

## 2023-12-25 RX ADMIN — AMPICILLIN SODIUM AND SULBACTAM SODIUM 3000 MG: 2; 1 INJECTION, POWDER, FOR SOLUTION INTRAMUSCULAR; INTRAVENOUS at 00:47

## 2023-12-25 RX ADMIN — INSULIN GLARGINE 20 UNITS: 100 INJECTION, SOLUTION SUBCUTANEOUS at 21:55

## 2023-12-25 RX ADMIN — KETOROLAC TROMETHAMINE 15 MG: 30 INJECTION, SOLUTION INTRAMUSCULAR; INTRAVENOUS at 19:01

## 2023-12-25 RX ADMIN — AMPICILLIN SODIUM AND SULBACTAM SODIUM 3000 MG: 2; 1 INJECTION, POWDER, FOR SOLUTION INTRAMUSCULAR; INTRAVENOUS at 22:58

## 2023-12-25 ASSESSMENT — PAIN DESCRIPTION - ORIENTATION: ORIENTATION: RIGHT

## 2023-12-25 ASSESSMENT — PAIN DESCRIPTION - LOCATION: LOCATION: LEG

## 2023-12-25 ASSESSMENT — PAIN SCALES - GENERAL: PAINLEVEL_OUTOF10: 7

## 2023-12-25 ASSESSMENT — PAIN DESCRIPTION - DESCRIPTORS: DESCRIPTORS: ACHING

## 2023-12-26 LAB
ANION GAP SERPL CALCULATED.3IONS-SCNC: 6 MMOL/L (ref 3–16)
BUN SERPL-MCNC: 14 MG/DL (ref 7–20)
CALCIUM SERPL-MCNC: 8.3 MG/DL (ref 8.3–10.6)
CHLORIDE SERPL-SCNC: 104 MMOL/L (ref 99–110)
CO2 SERPL-SCNC: 27 MMOL/L (ref 21–32)
CREAT SERPL-MCNC: 0.8 MG/DL (ref 0.9–1.3)
DEPRECATED RDW RBC AUTO: 14.2 % (ref 12.4–15.4)
GFR SERPLBLD CREATININE-BSD FMLA CKD-EPI: >60 ML/MIN/{1.73_M2}
GLUCOSE BLD-MCNC: 118 MG/DL (ref 70–99)
GLUCOSE BLD-MCNC: 133 MG/DL (ref 70–99)
GLUCOSE BLD-MCNC: 135 MG/DL (ref 70–99)
GLUCOSE BLD-MCNC: 169 MG/DL (ref 70–99)
GLUCOSE BLD-MCNC: 185 MG/DL (ref 70–99)
GLUCOSE BLD-MCNC: 232 MG/DL (ref 70–99)
GLUCOSE SERPL-MCNC: 136 MG/DL (ref 70–99)
HCT VFR BLD AUTO: 27.9 % (ref 40.5–52.5)
HGB BLD-MCNC: 9.2 G/DL (ref 13.5–17.5)
MCH RBC QN AUTO: 29.7 PG (ref 26–34)
MCHC RBC AUTO-ENTMCNC: 33.1 G/DL (ref 31–36)
MCV RBC AUTO: 89.5 FL (ref 80–100)
PERFORMED ON: ABNORMAL
PLATELET # BLD AUTO: 407 K/UL (ref 135–450)
PMV BLD AUTO: 6.2 FL (ref 5–10.5)
POTASSIUM SERPL-SCNC: 4.1 MMOL/L (ref 3.5–5.1)
RBC # BLD AUTO: 3.12 M/UL (ref 4.2–5.9)
SODIUM SERPL-SCNC: 137 MMOL/L (ref 136–145)
WBC # BLD AUTO: 12.4 K/UL (ref 4–11)

## 2023-12-26 PROCEDURE — 6360000002 HC RX W HCPCS: Performed by: INTERNAL MEDICINE

## 2023-12-26 PROCEDURE — 6370000000 HC RX 637 (ALT 250 FOR IP): Performed by: INTERNAL MEDICINE

## 2023-12-26 PROCEDURE — 99233 SBSQ HOSP IP/OBS HIGH 50: CPT | Performed by: INTERNAL MEDICINE

## 2023-12-26 PROCEDURE — 6370000000 HC RX 637 (ALT 250 FOR IP): Performed by: NURSE PRACTITIONER

## 2023-12-26 PROCEDURE — 85027 COMPLETE CBC AUTOMATED: CPT

## 2023-12-26 PROCEDURE — 2060000000 HC ICU INTERMEDIATE R&B

## 2023-12-26 PROCEDURE — 2580000003 HC RX 258: Performed by: INTERNAL MEDICINE

## 2023-12-26 PROCEDURE — 97535 SELF CARE MNGMENT TRAINING: CPT

## 2023-12-26 PROCEDURE — 6370000000 HC RX 637 (ALT 250 FOR IP): Performed by: HOSPITALIST

## 2023-12-26 PROCEDURE — 80048 BASIC METABOLIC PNL TOTAL CA: CPT

## 2023-12-26 PROCEDURE — 97530 THERAPEUTIC ACTIVITIES: CPT

## 2023-12-26 PROCEDURE — 6360000002 HC RX W HCPCS: Performed by: SURGERY

## 2023-12-26 PROCEDURE — 99024 POSTOP FOLLOW-UP VISIT: CPT | Performed by: SURGERY

## 2023-12-26 PROCEDURE — 97110 THERAPEUTIC EXERCISES: CPT

## 2023-12-26 PROCEDURE — 36415 COLL VENOUS BLD VENIPUNCTURE: CPT

## 2023-12-26 RX ADMIN — KETOROLAC TROMETHAMINE 15 MG: 30 INJECTION, SOLUTION INTRAMUSCULAR; INTRAVENOUS at 02:36

## 2023-12-26 RX ADMIN — AMPICILLIN SODIUM AND SULBACTAM SODIUM 3000 MG: 2; 1 INJECTION, POWDER, FOR SOLUTION INTRAMUSCULAR; INTRAVENOUS at 11:36

## 2023-12-26 RX ADMIN — AMPICILLIN SODIUM AND SULBACTAM SODIUM 3000 MG: 2; 1 INJECTION, POWDER, FOR SOLUTION INTRAMUSCULAR; INTRAVENOUS at 22:21

## 2023-12-26 RX ADMIN — AMPICILLIN SODIUM AND SULBACTAM SODIUM 3000 MG: 2; 1 INJECTION, POWDER, FOR SOLUTION INTRAMUSCULAR; INTRAVENOUS at 17:15

## 2023-12-26 RX ADMIN — INSULIN LISPRO 4 UNITS: 100 INJECTION, SOLUTION INTRAVENOUS; SUBCUTANEOUS at 18:01

## 2023-12-26 RX ADMIN — AMPICILLIN SODIUM AND SULBACTAM SODIUM 3000 MG: 2; 1 INJECTION, POWDER, FOR SOLUTION INTRAMUSCULAR; INTRAVENOUS at 05:24

## 2023-12-26 RX ADMIN — OXYCODONE HYDROCHLORIDE 10 MG: 10 TABLET ORAL at 22:25

## 2023-12-26 RX ADMIN — LOPERAMIDE HYDROCHLORIDE 2 MG: 2 CAPSULE ORAL at 17:14

## 2023-12-26 RX ADMIN — KETOROLAC TROMETHAMINE 15 MG: 30 INJECTION, SOLUTION INTRAMUSCULAR; INTRAVENOUS at 18:01

## 2023-12-26 RX ADMIN — INSULIN GLARGINE 20 UNITS: 100 INJECTION, SOLUTION SUBCUTANEOUS at 22:20

## 2023-12-26 RX ADMIN — KETOROLAC TROMETHAMINE 15 MG: 30 INJECTION, SOLUTION INTRAMUSCULAR; INTRAVENOUS at 15:50

## 2023-12-26 RX ADMIN — LOPERAMIDE HYDROCHLORIDE 2 MG: 2 CAPSULE ORAL at 22:19

## 2023-12-26 RX ADMIN — MORPHINE SULFATE 4 MG: 4 INJECTION, SOLUTION INTRAMUSCULAR; INTRAVENOUS at 12:35

## 2023-12-26 RX ADMIN — SODIUM CHLORIDE, PRESERVATIVE FREE 10 ML: 5 INJECTION INTRAVENOUS at 22:20

## 2023-12-26 RX ADMIN — KETOROLAC TROMETHAMINE 15 MG: 30 INJECTION, SOLUTION INTRAMUSCULAR; INTRAVENOUS at 06:39

## 2023-12-26 ASSESSMENT — PAIN DESCRIPTION - ORIENTATION
ORIENTATION: RIGHT
ORIENTATION: RIGHT

## 2023-12-26 ASSESSMENT — PAIN DESCRIPTION - LOCATION
LOCATION: LEG
LOCATION: LEG

## 2023-12-26 ASSESSMENT — PAIN SCALES - GENERAL
PAINLEVEL_OUTOF10: 5
PAINLEVEL_OUTOF10: 7

## 2023-12-26 ASSESSMENT — PAIN DESCRIPTION - DESCRIPTORS
DESCRIPTORS: THROBBING
DESCRIPTORS: THROBBING

## 2023-12-27 LAB
ANION GAP SERPL CALCULATED.3IONS-SCNC: 7 MMOL/L (ref 3–16)
BUN SERPL-MCNC: 18 MG/DL (ref 7–20)
CALCIUM SERPL-MCNC: 8.6 MG/DL (ref 8.3–10.6)
CHLORIDE SERPL-SCNC: 103 MMOL/L (ref 99–110)
CO2 SERPL-SCNC: 28 MMOL/L (ref 21–32)
CREAT SERPL-MCNC: 0.7 MG/DL (ref 0.9–1.3)
DEPRECATED RDW RBC AUTO: 14 % (ref 12.4–15.4)
GFR SERPLBLD CREATININE-BSD FMLA CKD-EPI: >60 ML/MIN/{1.73_M2}
GLUCOSE BLD-MCNC: 102 MG/DL (ref 70–99)
GLUCOSE BLD-MCNC: 111 MG/DL (ref 70–99)
GLUCOSE BLD-MCNC: 144 MG/DL (ref 70–99)
GLUCOSE BLD-MCNC: 154 MG/DL (ref 70–99)
GLUCOSE BLD-MCNC: 162 MG/DL (ref 70–99)
GLUCOSE BLD-MCNC: 94 MG/DL (ref 70–99)
GLUCOSE SERPL-MCNC: 93 MG/DL (ref 70–99)
HCT VFR BLD AUTO: 27.7 % (ref 40.5–52.5)
HGB BLD-MCNC: 9.5 G/DL (ref 13.5–17.5)
MCH RBC QN AUTO: 30.5 PG (ref 26–34)
MCHC RBC AUTO-ENTMCNC: 34.3 G/DL (ref 31–36)
MCV RBC AUTO: 88.9 FL (ref 80–100)
PERFORMED ON: ABNORMAL
PERFORMED ON: NORMAL
PLATELET # BLD AUTO: 409 K/UL (ref 135–450)
PMV BLD AUTO: 6.1 FL (ref 5–10.5)
POTASSIUM SERPL-SCNC: 4.3 MMOL/L (ref 3.5–5.1)
RBC # BLD AUTO: 3.11 M/UL (ref 4.2–5.9)
SODIUM SERPL-SCNC: 138 MMOL/L (ref 136–145)
WBC # BLD AUTO: 11.8 K/UL (ref 4–11)

## 2023-12-27 PROCEDURE — 36415 COLL VENOUS BLD VENIPUNCTURE: CPT

## 2023-12-27 PROCEDURE — 6370000000 HC RX 637 (ALT 250 FOR IP): Performed by: HOSPITALIST

## 2023-12-27 PROCEDURE — 02HV33Z INSERTION OF INFUSION DEVICE INTO SUPERIOR VENA CAVA, PERCUTANEOUS APPROACH: ICD-10-PCS | Performed by: SURGERY

## 2023-12-27 PROCEDURE — 99232 SBSQ HOSP IP/OBS MODERATE 35: CPT | Performed by: INTERNAL MEDICINE

## 2023-12-27 PROCEDURE — 97530 THERAPEUTIC ACTIVITIES: CPT

## 2023-12-27 PROCEDURE — 85027 COMPLETE CBC AUTOMATED: CPT

## 2023-12-27 PROCEDURE — 6370000000 HC RX 637 (ALT 250 FOR IP): Performed by: INTERNAL MEDICINE

## 2023-12-27 PROCEDURE — 2580000003 HC RX 258: Performed by: INTERNAL MEDICINE

## 2023-12-27 PROCEDURE — 36569 INSJ PICC 5 YR+ W/O IMAGING: CPT

## 2023-12-27 PROCEDURE — 97110 THERAPEUTIC EXERCISES: CPT

## 2023-12-27 PROCEDURE — 6360000002 HC RX W HCPCS: Performed by: INTERNAL MEDICINE

## 2023-12-27 PROCEDURE — C1751 CATH, INF, PER/CENT/MIDLINE: HCPCS

## 2023-12-27 PROCEDURE — 80048 BASIC METABOLIC PNL TOTAL CA: CPT

## 2023-12-27 PROCEDURE — 99024 POSTOP FOLLOW-UP VISIT: CPT | Performed by: SURGERY

## 2023-12-27 PROCEDURE — 6360000002 HC RX W HCPCS: Performed by: SURGERY

## 2023-12-27 PROCEDURE — 2060000000 HC ICU INTERMEDIATE R&B

## 2023-12-27 RX ORDER — SODIUM CHLORIDE 0.9 % (FLUSH) 0.9 %
5-40 SYRINGE (ML) INJECTION PRN
Status: DISCONTINUED | OUTPATIENT
Start: 2023-12-27 | End: 2024-01-02 | Stop reason: HOSPADM

## 2023-12-27 RX ORDER — LIDOCAINE HYDROCHLORIDE 10 MG/ML
5 INJECTION, SOLUTION EPIDURAL; INFILTRATION; INTRACAUDAL; PERINEURAL ONCE
Status: DISCONTINUED | OUTPATIENT
Start: 2023-12-27 | End: 2024-01-02 | Stop reason: HOSPADM

## 2023-12-27 RX ORDER — SODIUM CHLORIDE 9 MG/ML
25 INJECTION, SOLUTION INTRAVENOUS PRN
Status: DISCONTINUED | OUTPATIENT
Start: 2023-12-27 | End: 2024-01-02 | Stop reason: HOSPADM

## 2023-12-27 RX ORDER — SODIUM CHLORIDE 0.9 % (FLUSH) 0.9 %
5-40 SYRINGE (ML) INJECTION EVERY 12 HOURS SCHEDULED
Status: DISCONTINUED | OUTPATIENT
Start: 2023-12-27 | End: 2024-01-02 | Stop reason: HOSPADM

## 2023-12-27 RX ADMIN — OXYCODONE HYDROCHLORIDE 5 MG: 5 TABLET ORAL at 15:24

## 2023-12-27 RX ADMIN — Medication 10 ML: at 09:42

## 2023-12-27 RX ADMIN — KETOROLAC TROMETHAMINE 15 MG: 30 INJECTION, SOLUTION INTRAMUSCULAR; INTRAVENOUS at 00:05

## 2023-12-27 RX ADMIN — OXYCODONE HYDROCHLORIDE 5 MG: 5 TABLET ORAL at 21:47

## 2023-12-27 RX ADMIN — AMPICILLIN SODIUM AND SULBACTAM SODIUM 3000 MG: 2; 1 INJECTION, POWDER, FOR SOLUTION INTRAMUSCULAR; INTRAVENOUS at 04:34

## 2023-12-27 RX ADMIN — SODIUM CHLORIDE, PRESERVATIVE FREE 10 ML: 5 INJECTION INTRAVENOUS at 21:48

## 2023-12-27 RX ADMIN — INSULIN GLARGINE 20 UNITS: 100 INJECTION, SOLUTION SUBCUTANEOUS at 21:47

## 2023-12-27 RX ADMIN — AMPICILLIN SODIUM AND SULBACTAM SODIUM 3000 MG: 2; 1 INJECTION, POWDER, FOR SOLUTION INTRAMUSCULAR; INTRAVENOUS at 23:52

## 2023-12-27 RX ADMIN — AMPICILLIN SODIUM AND SULBACTAM SODIUM 3000 MG: 2; 1 INJECTION, POWDER, FOR SOLUTION INTRAMUSCULAR; INTRAVENOUS at 18:03

## 2023-12-27 RX ADMIN — AMPICILLIN SODIUM AND SULBACTAM SODIUM 3000 MG: 2; 1 INJECTION, POWDER, FOR SOLUTION INTRAMUSCULAR; INTRAVENOUS at 11:27

## 2023-12-27 RX ADMIN — KETOROLAC TROMETHAMINE 15 MG: 30 INJECTION, SOLUTION INTRAMUSCULAR; INTRAVENOUS at 06:55

## 2023-12-27 RX ADMIN — SODIUM CHLORIDE, PRESERVATIVE FREE 10 ML: 5 INJECTION INTRAVENOUS at 09:42

## 2023-12-27 ASSESSMENT — PAIN - FUNCTIONAL ASSESSMENT
PAIN_FUNCTIONAL_ASSESSMENT: ACTIVITIES ARE NOT PREVENTED
PAIN_FUNCTIONAL_ASSESSMENT: PREVENTS OR INTERFERES SOME ACTIVE ACTIVITIES AND ADLS

## 2023-12-27 ASSESSMENT — PAIN DESCRIPTION - DESCRIPTORS
DESCRIPTORS: THROBBING
DESCRIPTORS: ACHING;DISCOMFORT;BURNING
DESCRIPTORS: THROBBING
DESCRIPTORS: THROBBING

## 2023-12-27 ASSESSMENT — PAIN DESCRIPTION - PAIN TYPE: TYPE: SURGICAL PAIN

## 2023-12-27 ASSESSMENT — PAIN DESCRIPTION - ORIENTATION
ORIENTATION: RIGHT
ORIENTATION: RIGHT;DISTAL
ORIENTATION: RIGHT
ORIENTATION: RIGHT

## 2023-12-27 ASSESSMENT — PAIN DESCRIPTION - ONSET: ONSET: GRADUAL

## 2023-12-27 ASSESSMENT — PAIN SCALES - GENERAL
PAINLEVEL_OUTOF10: 0
PAINLEVEL_OUTOF10: 4
PAINLEVEL_OUTOF10: 0
PAINLEVEL_OUTOF10: 0
PAINLEVEL_OUTOF10: 4
PAINLEVEL_OUTOF10: 0
PAINLEVEL_OUTOF10: 4
PAINLEVEL_OUTOF10: 0
PAINLEVEL_OUTOF10: 4

## 2023-12-27 ASSESSMENT — PAIN DESCRIPTION - LOCATION
LOCATION: LEG

## 2023-12-27 ASSESSMENT — PAIN SCALES - WONG BAKER: WONGBAKER_NUMERICALRESPONSE: 0

## 2023-12-27 ASSESSMENT — PAIN DESCRIPTION - FREQUENCY: FREQUENCY: INTERMITTENT

## 2023-12-28 LAB
GLUCOSE BLD-MCNC: 131 MG/DL (ref 70–99)
GLUCOSE BLD-MCNC: 154 MG/DL (ref 70–99)
GLUCOSE BLD-MCNC: 157 MG/DL (ref 70–99)
GLUCOSE BLD-MCNC: 158 MG/DL (ref 70–99)
GLUCOSE BLD-MCNC: 164 MG/DL (ref 70–99)
GLUCOSE BLD-MCNC: 217 MG/DL (ref 70–99)
PERFORMED ON: ABNORMAL

## 2023-12-28 PROCEDURE — 97110 THERAPEUTIC EXERCISES: CPT

## 2023-12-28 PROCEDURE — 6370000000 HC RX 637 (ALT 250 FOR IP): Performed by: INTERNAL MEDICINE

## 2023-12-28 PROCEDURE — 6360000002 HC RX W HCPCS: Performed by: INTERNAL MEDICINE

## 2023-12-28 PROCEDURE — 97535 SELF CARE MNGMENT TRAINING: CPT

## 2023-12-28 PROCEDURE — 2580000003 HC RX 258: Performed by: INTERNAL MEDICINE

## 2023-12-28 PROCEDURE — 6370000000 HC RX 637 (ALT 250 FOR IP): Performed by: HOSPITALIST

## 2023-12-28 PROCEDURE — 2060000000 HC ICU INTERMEDIATE R&B

## 2023-12-28 PROCEDURE — 97530 THERAPEUTIC ACTIVITIES: CPT

## 2023-12-28 PROCEDURE — 99232 SBSQ HOSP IP/OBS MODERATE 35: CPT | Performed by: INTERNAL MEDICINE

## 2023-12-28 PROCEDURE — 6370000000 HC RX 637 (ALT 250 FOR IP): Performed by: NURSE PRACTITIONER

## 2023-12-28 RX ORDER — METRONIDAZOLE 500 MG/1
500 TABLET ORAL EVERY 8 HOURS SCHEDULED
Status: DISCONTINUED | OUTPATIENT
Start: 2023-12-28 | End: 2024-01-02 | Stop reason: HOSPADM

## 2023-12-28 RX ADMIN — CEFTRIAXONE SODIUM 2000 MG: 2 INJECTION, POWDER, FOR SOLUTION INTRAMUSCULAR; INTRAVENOUS at 09:30

## 2023-12-28 RX ADMIN — INSULIN GLARGINE 20 UNITS: 100 INJECTION, SOLUTION SUBCUTANEOUS at 20:55

## 2023-12-28 RX ADMIN — Medication 10 ML: at 09:30

## 2023-12-28 RX ADMIN — SODIUM CHLORIDE, PRESERVATIVE FREE 10 ML: 5 INJECTION INTRAVENOUS at 09:29

## 2023-12-28 RX ADMIN — SODIUM CHLORIDE, PRESERVATIVE FREE 10 ML: 5 INJECTION INTRAVENOUS at 20:56

## 2023-12-28 RX ADMIN — METRONIDAZOLE 500 MG: 500 TABLET ORAL at 22:29

## 2023-12-28 RX ADMIN — MORPHINE SULFATE 2 MG: 2 INJECTION, SOLUTION INTRAMUSCULAR; INTRAVENOUS at 15:04

## 2023-12-28 RX ADMIN — METRONIDAZOLE 500 MG: 500 TABLET ORAL at 15:04

## 2023-12-28 RX ADMIN — AMPICILLIN SODIUM AND SULBACTAM SODIUM 3000 MG: 2; 1 INJECTION, POWDER, FOR SOLUTION INTRAMUSCULAR; INTRAVENOUS at 05:29

## 2023-12-28 RX ADMIN — INSULIN LISPRO 4 UNITS: 100 INJECTION, SOLUTION INTRAVENOUS; SUBCUTANEOUS at 18:20

## 2023-12-28 ASSESSMENT — PAIN SCALES - GENERAL
PAINLEVEL_OUTOF10: 0
PAINLEVEL_OUTOF10: 5
PAINLEVEL_OUTOF10: 0
PAINLEVEL_OUTOF10: 0

## 2023-12-28 ASSESSMENT — PAIN DESCRIPTION - DESCRIPTORS: DESCRIPTORS: ACHING;DISCOMFORT

## 2023-12-28 ASSESSMENT — PAIN DESCRIPTION - LOCATION: LOCATION: LEG

## 2023-12-28 ASSESSMENT — PAIN DESCRIPTION - ORIENTATION: ORIENTATION: RIGHT

## 2023-12-28 ASSESSMENT — PAIN - FUNCTIONAL ASSESSMENT: PAIN_FUNCTIONAL_ASSESSMENT: PREVENTS OR INTERFERES SOME ACTIVE ACTIVITIES AND ADLS

## 2023-12-28 NOTE — DISCHARGE INSTR - COC
Continuity of Care Form    Patient Name: Chucky Gregory   :  1970  MRN:  [id number]    Admit date:  2023  Discharge date:  2024    Code Status Order: Full Code   Advance Directives:     Admitting Physician:  Emmanuel Mead MD  PCP: Vandana Flowers DO    Discharging Nurse: Conner  Discharging Hospital Unit/Room#: U8X-6837/5264-01  Discharging Unit Phone Number: [phone]    Emergency Contact:   Extended Emergency Contact Information  Primary Emergency Contact: Michael Gregory  Home Phone: [phone]  Relation: Aunt/Uncle  Secondary Emergency Contact: Paula Gregory  Mobile Phone: [phone]  Relation: Aunt/Uncle  Preferred language: English   needed? No    Past Surgical History:  Past Surgical History:   Procedure Laterality Date    FINGER SURGERY Left 2021    I&D done on left 3rd finger    HAND SURGERY Left 2021    LEFT HAND INCISION AND DRAINAGE MIDDLE FINGER performed by Rachel Lizarraga MD at [facility] OR    HERNIA REPAIR      LEG AMPUTATION BELOW KNEE Right 2023    RIGHT LEG BELOW KNEE GUILLOTINE AMPUTATION performed by Uday Terrell MD at [facility] OR    LEG AMPUTATION BELOW KNEE Right 2023    RIGHT BELOW KNEE AMPUTATION performed by Uday Terrell MD at [facility] OR       Immunization History:   Immunization History   Administered Date(s) Administered    COVID-19, PFIZER PURPLE top, DILUTE for use, (age 12 y+), 30mcg/0.3mL 2021, 2021    DTaP vaccine 2021    Influenza, FLUCELVAX, (age 6 mo+), MDCK, PF, 0.5mL 10/08/2021    Td vaccine (adult) 1997       Active Problems:  Patient Active Problem List   Diagnosis Code    Elevated C-reactive protein (CRP) R79.82    Class 1 obesity due to excess calories with body mass index (BMI) of 34.0 to 34.9 in adult E66.09, Z68.34    Weight loss counseling, encounter for Z71.3    DMII (diabetes mellitus, type 2) (HCC) E11.9    Obesity, diabetes, and hypertension syndrome (HCC) E11.69, E66.9, E11.59, I15.2

## 2023-12-29 LAB
ANION GAP SERPL CALCULATED.3IONS-SCNC: 5 MMOL/L (ref 3–16)
BUN SERPL-MCNC: 21 MG/DL (ref 7–20)
CALCIUM SERPL-MCNC: 8.9 MG/DL (ref 8.3–10.6)
CHLORIDE SERPL-SCNC: 104 MMOL/L (ref 99–110)
CO2 SERPL-SCNC: 30 MMOL/L (ref 21–32)
CREAT SERPL-MCNC: 0.7 MG/DL (ref 0.9–1.3)
DEPRECATED RDW RBC AUTO: 14.7 % (ref 12.4–15.4)
GFR SERPLBLD CREATININE-BSD FMLA CKD-EPI: >60 ML/MIN/{1.73_M2}
GLUCOSE BLD-MCNC: 125 MG/DL (ref 70–99)
GLUCOSE BLD-MCNC: 144 MG/DL (ref 70–99)
GLUCOSE BLD-MCNC: 146 MG/DL (ref 70–99)
GLUCOSE BLD-MCNC: 156 MG/DL (ref 70–99)
GLUCOSE BLD-MCNC: 157 MG/DL (ref 70–99)
GLUCOSE BLD-MCNC: 203 MG/DL (ref 70–99)
GLUCOSE SERPL-MCNC: 154 MG/DL (ref 70–99)
HCT VFR BLD AUTO: 29 % (ref 40.5–52.5)
HGB BLD-MCNC: 10 G/DL (ref 13.5–17.5)
MCH RBC QN AUTO: 30.7 PG (ref 26–34)
MCHC RBC AUTO-ENTMCNC: 34.5 G/DL (ref 31–36)
MCV RBC AUTO: 89.1 FL (ref 80–100)
PERFORMED ON: ABNORMAL
PLATELET # BLD AUTO: 405 K/UL (ref 135–450)
PMV BLD AUTO: 6.1 FL (ref 5–10.5)
POTASSIUM SERPL-SCNC: 4.4 MMOL/L (ref 3.5–5.1)
RBC # BLD AUTO: 3.25 M/UL (ref 4.2–5.9)
SODIUM SERPL-SCNC: 139 MMOL/L (ref 136–145)
WBC # BLD AUTO: 12.3 K/UL (ref 4–11)

## 2023-12-29 PROCEDURE — 80048 BASIC METABOLIC PNL TOTAL CA: CPT

## 2023-12-29 PROCEDURE — 2580000003 HC RX 258: Performed by: INTERNAL MEDICINE

## 2023-12-29 PROCEDURE — 99024 POSTOP FOLLOW-UP VISIT: CPT | Performed by: SURGERY

## 2023-12-29 PROCEDURE — 2060000000 HC ICU INTERMEDIATE R&B

## 2023-12-29 PROCEDURE — 6360000002 HC RX W HCPCS: Performed by: INTERNAL MEDICINE

## 2023-12-29 PROCEDURE — 85027 COMPLETE CBC AUTOMATED: CPT

## 2023-12-29 PROCEDURE — 6370000000 HC RX 637 (ALT 250 FOR IP): Performed by: NURSE PRACTITIONER

## 2023-12-29 PROCEDURE — 6370000000 HC RX 637 (ALT 250 FOR IP): Performed by: INTERNAL MEDICINE

## 2023-12-29 PROCEDURE — 99232 SBSQ HOSP IP/OBS MODERATE 35: CPT | Performed by: INTERNAL MEDICINE

## 2023-12-29 PROCEDURE — 6370000000 HC RX 637 (ALT 250 FOR IP): Performed by: HOSPITALIST

## 2023-12-29 RX ADMIN — METRONIDAZOLE 500 MG: 500 TABLET ORAL at 21:35

## 2023-12-29 RX ADMIN — Medication 10 ML: at 08:40

## 2023-12-29 RX ADMIN — Medication 10 ML: at 21:35

## 2023-12-29 RX ADMIN — INSULIN LISPRO 4 UNITS: 100 INJECTION, SOLUTION INTRAVENOUS; SUBCUTANEOUS at 17:20

## 2023-12-29 RX ADMIN — SODIUM CHLORIDE, PRESERVATIVE FREE 10 ML: 5 INJECTION INTRAVENOUS at 21:35

## 2023-12-29 RX ADMIN — CEFTRIAXONE SODIUM 2000 MG: 2 INJECTION, POWDER, FOR SOLUTION INTRAMUSCULAR; INTRAVENOUS at 08:39

## 2023-12-29 RX ADMIN — MORPHINE SULFATE 2 MG: 2 INJECTION, SOLUTION INTRAMUSCULAR; INTRAVENOUS at 10:50

## 2023-12-29 RX ADMIN — SODIUM CHLORIDE, PRESERVATIVE FREE 10 ML: 5 INJECTION INTRAVENOUS at 08:39

## 2023-12-29 RX ADMIN — METRONIDAZOLE 500 MG: 500 TABLET ORAL at 05:48

## 2023-12-29 RX ADMIN — INSULIN GLARGINE 20 UNITS: 100 INJECTION, SOLUTION SUBCUTANEOUS at 21:35

## 2023-12-29 RX ADMIN — METRONIDAZOLE 500 MG: 500 TABLET ORAL at 14:11

## 2023-12-30 PROBLEM — E66.01 MORBID OBESITY DUE TO EXCESS CALORIES (HCC): Status: ACTIVE | Noted: 2023-12-30

## 2023-12-30 LAB
GLUCOSE BLD-MCNC: 120 MG/DL (ref 70–99)
GLUCOSE BLD-MCNC: 131 MG/DL (ref 70–99)
GLUCOSE BLD-MCNC: 145 MG/DL (ref 70–99)
GLUCOSE BLD-MCNC: 146 MG/DL (ref 70–99)
GLUCOSE BLD-MCNC: 149 MG/DL (ref 70–99)
GLUCOSE BLD-MCNC: 196 MG/DL (ref 70–99)
PERFORMED ON: ABNORMAL

## 2023-12-30 PROCEDURE — 2060000000 HC ICU INTERMEDIATE R&B

## 2023-12-30 PROCEDURE — 97530 THERAPEUTIC ACTIVITIES: CPT

## 2023-12-30 PROCEDURE — 97535 SELF CARE MNGMENT TRAINING: CPT

## 2023-12-30 PROCEDURE — 6370000000 HC RX 637 (ALT 250 FOR IP): Performed by: HOSPITALIST

## 2023-12-30 PROCEDURE — 6370000000 HC RX 637 (ALT 250 FOR IP): Performed by: INTERNAL MEDICINE

## 2023-12-30 PROCEDURE — 2580000003 HC RX 258: Performed by: INTERNAL MEDICINE

## 2023-12-30 PROCEDURE — 6360000002 HC RX W HCPCS: Performed by: INTERNAL MEDICINE

## 2023-12-30 RX ADMIN — CEFTRIAXONE SODIUM 2000 MG: 2 INJECTION, POWDER, FOR SOLUTION INTRAMUSCULAR; INTRAVENOUS at 08:40

## 2023-12-30 RX ADMIN — INSULIN GLARGINE 20 UNITS: 100 INJECTION, SOLUTION SUBCUTANEOUS at 21:10

## 2023-12-30 RX ADMIN — SODIUM CHLORIDE, PRESERVATIVE FREE 10 ML: 5 INJECTION INTRAVENOUS at 21:09

## 2023-12-30 RX ADMIN — SODIUM CHLORIDE, PRESERVATIVE FREE 10 ML: 5 INJECTION INTRAVENOUS at 08:35

## 2023-12-30 RX ADMIN — METRONIDAZOLE 500 MG: 500 TABLET ORAL at 14:13

## 2023-12-30 RX ADMIN — METRONIDAZOLE 500 MG: 500 TABLET ORAL at 06:27

## 2023-12-30 RX ADMIN — Medication 10 ML: at 21:09

## 2023-12-30 RX ADMIN — METRONIDAZOLE 500 MG: 500 TABLET ORAL at 21:53

## 2023-12-30 ASSESSMENT — PAIN SCALES - GENERAL: PAINLEVEL_OUTOF10: 0

## 2023-12-31 LAB
ALBUMIN SERPL-MCNC: 3.1 G/DL (ref 3.4–5)
ANION GAP SERPL CALCULATED.3IONS-SCNC: 7 MMOL/L (ref 3–16)
BUN SERPL-MCNC: 22 MG/DL (ref 7–20)
CALCIUM SERPL-MCNC: 9 MG/DL (ref 8.3–10.6)
CHLORIDE SERPL-SCNC: 101 MMOL/L (ref 99–110)
CO2 SERPL-SCNC: 28 MMOL/L (ref 21–32)
CREAT SERPL-MCNC: 0.8 MG/DL (ref 0.9–1.3)
GFR SERPLBLD CREATININE-BSD FMLA CKD-EPI: >60 ML/MIN/{1.73_M2}
GLUCOSE BLD-MCNC: 113 MG/DL (ref 70–99)
GLUCOSE BLD-MCNC: 127 MG/DL (ref 70–99)
GLUCOSE BLD-MCNC: 194 MG/DL (ref 70–99)
GLUCOSE BLD-MCNC: 194 MG/DL (ref 70–99)
GLUCOSE BLD-MCNC: 248 MG/DL (ref 70–99)
GLUCOSE SERPL-MCNC: 140 MG/DL (ref 70–99)
MAGNESIUM SERPL-MCNC: 2 MG/DL (ref 1.8–2.4)
PERFORMED ON: ABNORMAL
PHOSPHATE SERPL-MCNC: 3.3 MG/DL (ref 2.5–4.9)
POTASSIUM SERPL-SCNC: 4.2 MMOL/L (ref 3.5–5.1)
SODIUM SERPL-SCNC: 136 MMOL/L (ref 136–145)

## 2023-12-31 PROCEDURE — 2580000003 HC RX 258: Performed by: INTERNAL MEDICINE

## 2023-12-31 PROCEDURE — 6370000000 HC RX 637 (ALT 250 FOR IP): Performed by: NURSE PRACTITIONER

## 2023-12-31 PROCEDURE — 2060000000 HC ICU INTERMEDIATE R&B

## 2023-12-31 PROCEDURE — 80069 RENAL FUNCTION PANEL: CPT

## 2023-12-31 PROCEDURE — 6370000000 HC RX 637 (ALT 250 FOR IP): Performed by: INTERNAL MEDICINE

## 2023-12-31 PROCEDURE — 6370000000 HC RX 637 (ALT 250 FOR IP): Performed by: HOSPITALIST

## 2023-12-31 PROCEDURE — 6360000002 HC RX W HCPCS: Performed by: INTERNAL MEDICINE

## 2023-12-31 PROCEDURE — 83735 ASSAY OF MAGNESIUM: CPT

## 2023-12-31 RX ADMIN — INSULIN GLARGINE 20 UNITS: 100 INJECTION, SOLUTION SUBCUTANEOUS at 20:53

## 2023-12-31 RX ADMIN — Medication 10 ML: at 20:53

## 2023-12-31 RX ADMIN — SODIUM CHLORIDE, PRESERVATIVE FREE 10 ML: 5 INJECTION INTRAVENOUS at 20:53

## 2023-12-31 RX ADMIN — METRONIDAZOLE 500 MG: 500 TABLET ORAL at 06:15

## 2023-12-31 RX ADMIN — METRONIDAZOLE 500 MG: 500 TABLET ORAL at 14:39

## 2023-12-31 RX ADMIN — INSULIN LISPRO 4 UNITS: 100 INJECTION, SOLUTION INTRAVENOUS; SUBCUTANEOUS at 12:54

## 2023-12-31 RX ADMIN — SODIUM CHLORIDE, PRESERVATIVE FREE 10 ML: 5 INJECTION INTRAVENOUS at 09:03

## 2023-12-31 RX ADMIN — METRONIDAZOLE 500 MG: 500 TABLET ORAL at 22:00

## 2023-12-31 RX ADMIN — CEFTRIAXONE SODIUM 2000 MG: 2 INJECTION, POWDER, FOR SOLUTION INTRAMUSCULAR; INTRAVENOUS at 09:03

## 2023-12-31 ASSESSMENT — PAIN SCALES - GENERAL: PAINLEVEL_OUTOF10: 0

## 2024-01-01 ENCOUNTER — APPOINTMENT (OUTPATIENT)
Dept: GENERAL RADIOLOGY | Age: 54
End: 2024-01-01
Payer: MEDICAID

## 2024-01-01 ENCOUNTER — HOSPITAL ENCOUNTER (EMERGENCY)
Age: 54
End: 2024-01-09
Attending: EMERGENCY MEDICINE
Payer: MEDICAID

## 2024-01-01 VITALS
SYSTOLIC BLOOD PRESSURE: 112 MMHG | HEART RATE: 126 BPM | DIASTOLIC BLOOD PRESSURE: 92 MMHG | OXYGEN SATURATION: 94 % | RESPIRATION RATE: 21 BRPM

## 2024-01-01 DIAGNOSIS — I46.9 CARDIAC ARREST (HCC): Primary | ICD-10-CM

## 2024-01-01 DIAGNOSIS — I44.2 COMPLETE HEART BLOCK (HCC): ICD-10-CM

## 2024-01-01 DIAGNOSIS — I47.20 VENTRICULAR TACHYCARDIA (HCC): ICD-10-CM

## 2024-01-01 LAB
ALBUMIN SERPL-MCNC: 2.8 G/DL (ref 3.4–5)
ALP SERPL-CCNC: 36 U/L (ref 40–129)
ALT SERPL-CCNC: 14 U/L (ref 10–40)
ANION GAP SERPL CALCULATED.3IONS-SCNC: 12 MMOL/L (ref 3–16)
AST SERPL-CCNC: 87 U/L (ref 15–37)
BASE EXCESS BLDA CALC-SCNC: -19 MMOL/L (ref -3–3)
BASE EXCESS BLDV CALC-SCNC: -3.3 MMOL/L (ref -2–3)
BASOPHILS # BLD: 0.1 K/UL (ref 0–0.2)
BASOPHILS NFR BLD: 0.6 %
BILIRUB DIRECT SERPL-MCNC: 0.6 MG/DL (ref 0–0.3)
BILIRUB INDIRECT SERPL-MCNC: -0.2 MG/DL (ref 0–1)
BILIRUB SERPL-MCNC: 0.4 MG/DL (ref 0–1)
BUN SERPL-MCNC: 8 MG/DL (ref 7–20)
CALCIUM SERPL-MCNC: 9.1 MG/DL (ref 8.3–10.6)
CHLORIDE SERPL-SCNC: 96 MMOL/L (ref 99–110)
CO2 BLDA-SCNC: 19 MMOL/L
CO2 BLDV-SCNC: 27 MMOL/L
CO2 SERPL-SCNC: 23 MMOL/L (ref 21–32)
COHGB MFR BLDA: 0.9 % (ref 0–1.5)
COHGB MFR BLDV: 1.1 % (ref 0–1.5)
CREAT SERPL-MCNC: 0.8 MG/DL (ref 0.9–1.3)
D DIMER: 14.64 UG/ML FEU (ref 0–0.6)
DEPRECATED RDW RBC AUTO: 15.3 % (ref 12.4–15.4)
DO-HGB MFR BLDV: 45.7 %
EKG ATRIAL RATE: 54 BPM
EKG DIAGNOSIS: NORMAL
EKG P AXIS: 60 DEGREES
EKG P-R INTERVAL: 224 MS
EKG Q-T INTERVAL: 426 MS
EKG QRS DURATION: 176 MS
EKG QTC CALCULATION (BAZETT): 403 MS
EKG R AXIS: -77 DEGREES
EKG T AXIS: 35 DEGREES
EKG VENTRICULAR RATE: 54 BPM
EOSINOPHIL # BLD: 0.6 K/UL (ref 0–0.6)
EOSINOPHIL NFR BLD: 4.3 %
FUNGUS SPEC CULT: NORMAL
GFR SERPLBLD CREATININE-BSD FMLA CKD-EPI: >60 ML/MIN/{1.73_M2}
GLUCOSE BLD-MCNC: 112 MG/DL (ref 70–99)
GLUCOSE BLD-MCNC: 135 MG/DL (ref 70–99)
GLUCOSE BLD-MCNC: 135 MG/DL (ref 70–99)
GLUCOSE BLD-MCNC: 139 MG/DL (ref 70–99)
GLUCOSE BLD-MCNC: 163 MG/DL (ref 70–99)
GLUCOSE BLD-MCNC: 226 MG/DL (ref 70–99)
GLUCOSE BLD-MCNC: 261 MG/DL (ref 70–99)
GLUCOSE BLD-MCNC: 281 MG/DL (ref 70–99)
GLUCOSE SERPL-MCNC: 242 MG/DL (ref 70–99)
HCO3 BLDA-SCNC: 16 MMOL/L (ref 21–29)
HCO3 BLDV-SCNC: 25.1 MMOL/L (ref 24–28)
HCT VFR BLD AUTO: 33.8 % (ref 40.5–52.5)
HGB BLD-MCNC: 11.1 G/DL (ref 13.5–17.5)
HGB BLDA-MCNC: 10 G/DL
LACTATE BLDV-SCNC: 11.2 MMOL/L (ref 0.4–1.9)
LIPASE SERPL-CCNC: 25 U/L (ref 13–60)
LOEFFLER MB STN SPEC: NORMAL
LYMPHOCYTES # BLD: 4.6 K/UL (ref 1–5.1)
LYMPHOCYTES NFR BLD: 31.7 %
MCH RBC QN AUTO: 29.8 PG (ref 26–34)
MCHC RBC AUTO-ENTMCNC: 33 G/DL (ref 31–36)
MCV RBC AUTO: 90.5 FL (ref 80–100)
METHGB MFR BLDA: 0.1 % (ref 0–1.4)
METHGB MFR BLDV: 0.1 % (ref 0–1.5)
MONOCYTES # BLD: 1 K/UL (ref 0–1.3)
MONOCYTES NFR BLD: 7.2 %
NEUTROPHILS # BLD: 8.1 K/UL (ref 1.7–7.7)
NEUTROPHILS NFR BLD: 56.2 %
NT-PROBNP SERPL-MCNC: 364 PG/ML (ref 0–124)
PCO2 BLDA: >98 MMHG (ref 35–45)
PCO2 BLDV: 59.9 MMHG (ref 41–51)
PERFORMED ON: ABNORMAL
PH BLDA: 6.81 [PH] (ref 7.35–7.45)
PH BLDV: 7.23 [PH] (ref 7.35–7.45)
PLATELET # BLD AUTO: 418 K/UL (ref 135–450)
PMV BLD AUTO: 6.9 FL (ref 5–10.5)
PO2 BLDA: 64.3 MMHG (ref 75–108)
PO2 BLDV: 36 MMHG (ref 25–40)
POTASSIUM SERPL-SCNC: 8.3 MMOL/L (ref 3.5–5.1)
PROT SERPL-MCNC: 7.8 G/DL (ref 6.4–8.2)
RBC # BLD AUTO: 3.73 M/UL (ref 4.2–5.9)
SAO2 % BLDA: 67 % (ref 93–100)
SAO2 % BLDV: 54 %
SODIUM SERPL-SCNC: 131 MMOL/L (ref 136–145)
TROPONIN, HIGH SENSITIVITY: 30 NG/L (ref 0–22)
WBC # BLD AUTO: 14.4 K/UL (ref 4–11)

## 2024-01-01 PROCEDURE — 6370000000 HC RX 637 (ALT 250 FOR IP): Performed by: HOSPITALIST

## 2024-01-01 PROCEDURE — 83690 ASSAY OF LIPASE: CPT

## 2024-01-01 PROCEDURE — 92950 HEART/LUNG RESUSCITATION CPR: CPT

## 2024-01-01 PROCEDURE — 99285 EMERGENCY DEPT VISIT HI MDM: CPT

## 2024-01-01 PROCEDURE — 2580000003 HC RX 258: Performed by: EMERGENCY MEDICINE

## 2024-01-01 PROCEDURE — 94761 N-INVAS EAR/PLS OXIMETRY MLT: CPT

## 2024-01-01 PROCEDURE — 84484 ASSAY OF TROPONIN QUANT: CPT

## 2024-01-01 PROCEDURE — 85379 FIBRIN DEGRADATION QUANT: CPT

## 2024-01-01 PROCEDURE — 2500000003 HC RX 250 WO HCPCS: Performed by: EMERGENCY MEDICINE

## 2024-01-01 PROCEDURE — 2580000003 HC RX 258: Performed by: INTERNAL MEDICINE

## 2024-01-01 PROCEDURE — 93005 ELECTROCARDIOGRAM TRACING: CPT

## 2024-01-01 PROCEDURE — 83880 ASSAY OF NATRIURETIC PEPTIDE: CPT

## 2024-01-01 PROCEDURE — 94760 N-INVAS EAR/PLS OXIMETRY 1: CPT

## 2024-01-01 PROCEDURE — 6370000000 HC RX 637 (ALT 250 FOR IP)

## 2024-01-01 PROCEDURE — 6360000002 HC RX W HCPCS: Performed by: EMERGENCY MEDICINE

## 2024-01-01 PROCEDURE — 6370000000 HC RX 637 (ALT 250 FOR IP): Performed by: NURSE PRACTITIONER

## 2024-01-01 PROCEDURE — 80048 BASIC METABOLIC PNL TOTAL CA: CPT

## 2024-01-01 PROCEDURE — 36415 COLL VENOUS BLD VENIPUNCTURE: CPT

## 2024-01-01 PROCEDURE — 80076 HEPATIC FUNCTION PANEL: CPT

## 2024-01-01 PROCEDURE — 2060000000 HC ICU INTERMEDIATE R&B

## 2024-01-01 PROCEDURE — 2700000000 HC OXYGEN THERAPY PER DAY

## 2024-01-01 PROCEDURE — 31500 INSERT EMERGENCY AIRWAY: CPT

## 2024-01-01 PROCEDURE — 87040 BLOOD CULTURE FOR BACTERIA: CPT

## 2024-01-01 PROCEDURE — 82803 BLOOD GASES ANY COMBINATION: CPT

## 2024-01-01 PROCEDURE — 6360000002 HC RX W HCPCS: Performed by: INTERNAL MEDICINE

## 2024-01-01 PROCEDURE — 85025 COMPLETE CBC W/AUTO DIFF WBC: CPT

## 2024-01-01 PROCEDURE — 83605 ASSAY OF LACTIC ACID: CPT

## 2024-01-01 PROCEDURE — 6370000000 HC RX 637 (ALT 250 FOR IP): Performed by: INTERNAL MEDICINE

## 2024-01-01 RX ORDER — SUCCINYLCHOLINE CHLORIDE 20 MG/ML
INJECTION INTRAMUSCULAR; INTRAVENOUS DAILY PRN
Status: COMPLETED | OUTPATIENT
Start: 2024-01-01 | End: 2024-01-01

## 2024-01-01 RX ORDER — EPINEPHRINE IN SOD CHLOR,ISO 1 MG/10 ML
SYRINGE (ML) INTRAVENOUS
Status: DISCONTINUED
Start: 2024-01-01 | End: 2024-01-01 | Stop reason: HOSPADM

## 2024-01-01 RX ORDER — ATROPINE SULFATE 0.1 MG/ML
INJECTION INTRAVENOUS DAILY PRN
Status: COMPLETED | OUTPATIENT
Start: 2024-01-01 | End: 2024-01-01

## 2024-01-01 RX ORDER — ETOMIDATE 2 MG/ML
20 INJECTION INTRAVENOUS ONCE
Status: DISCONTINUED | OUTPATIENT
Start: 2024-01-01 | End: 2024-01-01 | Stop reason: HOSPADM

## 2024-01-01 RX ORDER — ETOMIDATE 2 MG/ML
INJECTION INTRAVENOUS
Status: DISCONTINUED
Start: 2024-01-01 | End: 2024-01-01 | Stop reason: HOSPADM

## 2024-01-01 RX ORDER — AMIODARONE HYDROCHLORIDE 50 MG/ML
INJECTION, SOLUTION INTRAVENOUS
Status: DISCONTINUED
Start: 2024-01-01 | End: 2024-01-01 | Stop reason: HOSPADM

## 2024-01-01 RX ORDER — SUCCINYLCHOLINE CHLORIDE 20 MG/ML
INJECTION INTRAMUSCULAR; INTRAVENOUS
Status: DISCONTINUED
Start: 2024-01-01 | End: 2024-01-01 | Stop reason: HOSPADM

## 2024-01-01 RX ORDER — AMIODARONE HYDROCHLORIDE 50 MG/ML
INJECTION, SOLUTION INTRAVENOUS DAILY PRN
Status: COMPLETED | OUTPATIENT
Start: 2024-01-01 | End: 2024-01-01

## 2024-01-01 RX ORDER — EPINEPHRINE IN SOD CHLOR,ISO 1 MG/10 ML
SYRINGE (ML) INTRAVENOUS DAILY PRN
Status: COMPLETED | OUTPATIENT
Start: 2024-01-01 | End: 2024-01-01

## 2024-01-01 RX ORDER — PROPOFOL 10 MG/ML
INJECTION, EMULSION INTRAVENOUS
Status: DISCONTINUED
Start: 2024-01-01 | End: 2024-01-01 | Stop reason: HOSPADM

## 2024-01-01 RX ORDER — SUCCINYLCHOLINE CHLORIDE 20 MG/ML
20 INJECTION INTRAMUSCULAR; INTRAVENOUS ONCE
Status: DISCONTINUED | OUTPATIENT
Start: 2024-01-01 | End: 2024-01-01 | Stop reason: HOSPADM

## 2024-01-01 RX ORDER — CALCIUM CHLORIDE 100 MG/ML
INJECTION INTRAVENOUS; INTRAVENTRICULAR DAILY PRN
Status: COMPLETED | OUTPATIENT
Start: 2024-01-01 | End: 2024-01-01

## 2024-01-01 RX ORDER — DEXTROSE MONOHYDRATE 25 G/50ML
INJECTION, SOLUTION INTRAVENOUS DAILY PRN
Status: COMPLETED | OUTPATIENT
Start: 2024-01-01 | End: 2024-01-01

## 2024-01-01 RX ORDER — ETOMIDATE 2 MG/ML
INJECTION INTRAVENOUS DAILY PRN
Status: COMPLETED | OUTPATIENT
Start: 2024-01-01 | End: 2024-01-01

## 2024-01-01 RX ADMIN — AMIODARONE HYDROCHLORIDE 1 MG/MIN: 50 INJECTION, SOLUTION INTRAVENOUS at 11:31

## 2024-01-01 RX ADMIN — EPINEPHRINE 0.5 MG: 0.1 INJECTION, SOLUTION ENDOTRACHEAL; INTRACARDIAC; INTRAVENOUS at 11:04

## 2024-01-01 RX ADMIN — EPINEPHRINE 1 MG: 0.1 INJECTION, SOLUTION ENDOTRACHEAL; INTRACARDIAC; INTRAVENOUS at 11:44

## 2024-01-01 RX ADMIN — EPINEPHRINE 1 MG: 0.1 INJECTION, SOLUTION ENDOTRACHEAL; INTRACARDIAC; INTRAVENOUS at 11:26

## 2024-01-01 RX ADMIN — EPINEPHRINE 1 MG: 0.1 INJECTION, SOLUTION ENDOTRACHEAL; INTRACARDIAC; INTRAVENOUS at 11:49

## 2024-01-01 RX ADMIN — SODIUM CHLORIDE, PRESERVATIVE FREE 10 ML: 5 INJECTION INTRAVENOUS at 08:56

## 2024-01-01 RX ADMIN — EPINEPHRINE 30 MCG: 0.1 INJECTION, SOLUTION ENDOTRACHEAL; INTRACARDIAC; INTRAVENOUS at 11:18

## 2024-01-01 RX ADMIN — METRONIDAZOLE 500 MG: 500 TABLET ORAL at 05:54

## 2024-01-01 RX ADMIN — ATROPINE SULFATE 1 MG: 0.1 INJECTION, SOLUTION INTRAVENOUS at 11:21

## 2024-01-01 RX ADMIN — EPINEPHRINE 0.5 MG: 0.1 INJECTION, SOLUTION ENDOTRACHEAL; INTRACARDIAC; INTRAVENOUS at 10:58

## 2024-01-01 RX ADMIN — EPINEPHRINE 1 MG: 0.1 INJECTION, SOLUTION ENDOTRACHEAL; INTRACARDIAC; INTRAVENOUS at 10:45

## 2024-01-01 RX ADMIN — EPINEPHRINE 1 MG: 0.1 INJECTION, SOLUTION ENDOTRACHEAL; INTRACARDIAC; INTRAVENOUS at 11:33

## 2024-01-01 RX ADMIN — EPINEPHRINE 1 MG: 0.1 INJECTION, SOLUTION ENDOTRACHEAL; INTRACARDIAC; INTRAVENOUS at 11:07

## 2024-01-01 RX ADMIN — INSULIN HUMAN 10 UNITS: 100 INJECTION, SOLUTION PARENTERAL at 11:36

## 2024-01-01 RX ADMIN — DEXTROSE 50 % IN WATER (D50W) INTRAVENOUS SYRINGE 25 G: at 11:31

## 2024-01-01 RX ADMIN — EPINEPHRINE 1 MG: 0.1 INJECTION, SOLUTION ENDOTRACHEAL; INTRACARDIAC; INTRAVENOUS at 11:22

## 2024-01-01 RX ADMIN — SODIUM CHLORIDE, PRESERVATIVE FREE 10 ML: 5 INJECTION INTRAVENOUS at 21:13

## 2024-01-01 RX ADMIN — EPINEPHRINE 1 MG: 0.1 INJECTION, SOLUTION ENDOTRACHEAL; INTRACARDIAC; INTRAVENOUS at 11:55

## 2024-01-01 RX ADMIN — ATROPINE SULFATE 1 MG: 0.1 INJECTION, SOLUTION INTRAVENOUS at 11:52

## 2024-01-01 RX ADMIN — CEFTRIAXONE SODIUM 2000 MG: 2 INJECTION, POWDER, FOR SOLUTION INTRAMUSCULAR; INTRAVENOUS at 08:58

## 2024-01-01 RX ADMIN — ETOMIDATE 20 MG: 2 INJECTION, SOLUTION INTRAVENOUS at 10:49

## 2024-01-01 RX ADMIN — INSULIN GLARGINE 20 UNITS: 100 INJECTION, SOLUTION SUBCUTANEOUS at 21:13

## 2024-01-01 RX ADMIN — INSULIN LISPRO 4 UNITS: 100 INJECTION, SOLUTION INTRAVENOUS; SUBCUTANEOUS at 12:05

## 2024-01-01 RX ADMIN — SODIUM BICARBONATE 50 MEQ: 84 INJECTION, SOLUTION INTRAVENOUS at 11:24

## 2024-01-01 RX ADMIN — SODIUM BICARBONATE 50 MEQ: 84 INJECTION, SOLUTION INTRAVENOUS at 11:30

## 2024-01-01 RX ADMIN — EPINEPHRINE 20 MCG/MIN: 1 INJECTION INTRAMUSCULAR; INTRAVENOUS; SUBCUTANEOUS at 11:34

## 2024-01-01 RX ADMIN — AMIODARONE HYDROCHLORIDE 150 MG: 50 INJECTION, SOLUTION INTRAVENOUS at 10:37

## 2024-01-01 RX ADMIN — METRONIDAZOLE 500 MG: 500 TABLET ORAL at 21:13

## 2024-01-01 RX ADMIN — Medication 10 ML: at 08:57

## 2024-01-01 RX ADMIN — EPINEPHRINE 1 MG: 0.1 INJECTION, SOLUTION ENDOTRACHEAL; INTRACARDIAC; INTRAVENOUS at 11:38

## 2024-01-01 RX ADMIN — EPINEPHRINE 1 MG: 0.1 INJECTION, SOLUTION ENDOTRACHEAL; INTRACARDIAC; INTRAVENOUS at 11:19

## 2024-01-01 RX ADMIN — METRONIDAZOLE 500 MG: 500 TABLET ORAL at 14:40

## 2024-01-01 RX ADMIN — AMIODARONE HYDROCHLORIDE 150 MG: 50 INJECTION, SOLUTION INTRAVENOUS at 10:51

## 2024-01-01 RX ADMIN — CALCIUM CHLORIDE 1000 MG: 100 INJECTION, SOLUTION INTRAVENOUS at 11:25

## 2024-01-01 RX ADMIN — SUCCINYLCHOLINE CHLORIDE 200 MG: 20 INJECTION, SOLUTION INTRAMUSCULAR; INTRAVENOUS; PARENTERAL at 10:59

## 2024-01-01 NOTE — PLAN OF CARE
Problem: Pain  Goal: Verbalizes/displays adequate comfort level or baseline comfort level  12/19/2023 1951 by Magdalena Candelaria, RN  Outcome: Progressing  12/19/2023 0554 by Scooter Pappas, RN  Outcome: Progressing  Note: Patient's pain is reassessed every 2 hours. Patient currently comfortable and does not appears to be in any pain. Will Continue to monitor.      Problem: Safety - Adult  Goal: Free from fall injury  12/19/2023 1951 by Magdalena Candelaria, RN  Outcome: Progressing  12/19/2023 0554 by Scooter Pappas, RN  Note: Bed Alarm On, Patient educated on how to use call light. Fall risk bracelet applied. Patient will be free from falls during hospital stay. Will continue to monitor throughout hospital stay.      Problem: Skin/Tissue Integrity  Goal: Absence of new skin breakdown  Description: 1.  Monitor for areas of redness and/or skin breakdown  2.  Assess vascular access sites hourly  3.  Every 4-6 hours minimum:  Change oxygen saturation probe site  4.  Every 4-6 hours:  If on nasal continuous positive airway pressure, respiratory therapy assess nares and determine need for appliance change or resting period.  Outcome: Progressing     Problem: Chronic Conditions and Co-morbidities  Goal: Patient's chronic conditions and co-morbidity symptoms are monitored and maintained or improved  Outcome: Progressing     Problem: Discharge Planning  Goal: Discharge to home or other facility with appropriate resources  Outcome: Progressing     
  Problem: Pain  Goal: Verbalizes/displays adequate comfort level or baseline comfort level  12/20/2023 0209 by Scooter Pappas RN  Outcome: Progressing  Note: Patient's pain is reassessed every 2 hours. Patient currently comfortable and does not appears to be in any pain. Will Continue to monitor.   12/20/2023 0208 by Scooter Pappas RN  Outcome: Progressing  12/19/2023 1951 by Magdalena Candelaria RN  Outcome: Progressing     Problem: Safety - Adult  Goal: Free from fall injury  12/20/2023 0209 by Scooter Pappas RN  Outcome: Progressing  Note: Bed Alarm On, Patient educated on how to use call light. Fall risk bracelet applied. Patient will be free from falls during hospital stay. Will continue to monitor throughout hospital stay.   12/20/2023 0208 by Scooter Pappas RN  Outcome: Progressing  12/19/2023 1951 by Magdalena Candelaria RN  Outcome: Progressing     Problem: Chronic Conditions and Co-morbidities  Goal: Patient's chronic conditions and co-morbidity symptoms are monitored and maintained or improved  12/20/2023 0208 by Scooter Pappas RN  Outcome: Progressing  12/19/2023 1951 by Magdalena Candelaria RN  Outcome: Progressing     Problem: Discharge Planning  Goal: Discharge to home or other facility with appropriate resources  12/20/2023 0208 by Scooter Pappas RN  Outcome: Progressing  12/19/2023 1951 by Magdalena Candelaria RN  Outcome: Progressing     
  Problem: Pain  Goal: Verbalizes/displays adequate comfort level or baseline comfort level  12/21/2023 1004 by Jessi Gerber, RN  Outcome: Progressing   Pain/discomfort being managed with PRN analgesics per MD orders. Pt able to express presence and absence of pain and rate pain appropriately using numerical scale.     Problem: Safety - Adult  Goal: Free from fall injury  12/21/2023 1004 by Jessi Gerber, RN  Outcome: Progressing   Fall risk assessment completed per unit protocol. Patient's bed is in the lowest position, call light is within reach and the patient's room is free of clutter. The patient has been instructed to call for assistance before getting out of bed or the chair.     Problem: Skin/Tissue Integrity  Goal: Absence of new skin breakdown  Description: 1.  Monitor for areas of redness and/or skin breakdown  2.  Assess vascular access sites hourly  3.  Every 4-6 hours minimum:  Change oxygen saturation probe site  4.  Every 4-6 hours:  If on nasal continuous positive airway pressure, respiratory therapy assess nares and determine need for appliance change or resting period.  12/21/2023 1004 by Jessi Gerber, RN  Outcome: Progressing   Patient's skin has been assessed per unit protocol and the patient is being repositioned or encouraged to turn every two hours to prevent skin breakdown and promote healing.     
  Problem: Pain  Goal: Verbalizes/displays adequate comfort level or baseline comfort level  12/21/2023 2106 by Lilo Rashid RN  Outcome: Progressing  Flowsheets (Taken 12/21/2023 2042)  Verbalizes/displays adequate comfort level or baseline comfort level:   Encourage patient to monitor pain and request assistance   Assess pain using appropriate pain scale   Administer analgesics based on type and severity of pain and evaluate response   Implement non-pharmacological measures as appropriate and evaluate response     Problem: Safety - Adult  Goal: Free from fall injury  12/21/2023 2106 by Lilo Rashid RN  Outcome: Progressing     Problem: Skin/Tissue Integrity  Goal: Absence of new skin breakdown  Description: 1.  Monitor for areas of redness and/or skin breakdown  2.  Assess vascular access sites hourly  3.  Every 4-6 hours minimum:  Change oxygen saturation probe site  4.  Every 4-6 hours:  If on nasal continuous positive airway pressure, respiratory therapy assess nares and determine need for appliance change or resting period.  12/21/2023 2106 by Lilo Rashid RN  Outcome: Progressing     Problem: Chronic Conditions and Co-morbidities  Goal: Patient's chronic conditions and co-morbidity symptoms are monitored and maintained or improved  Outcome: Progressing     Problem: Discharge Planning  Goal: Discharge to home or other facility with appropriate resources  Outcome: Progressing     
  Problem: Pain  Goal: Verbalizes/displays adequate comfort level or baseline comfort level  12/22/2023 2017 by Myra Wolff RN  Outcome: Progressing  Flowsheets (Taken 12/22/2023 2000 by Lilo Rashid RN)  Verbalizes/displays adequate comfort level or baseline comfort level:   Encourage patient to monitor pain and request assistance   Assess pain using appropriate pain scale   Administer analgesics based on type and severity of pain and evaluate response   Implement non-pharmacological measures as appropriate and evaluate response  12/22/2023 2005 by Lilo Rashid RN  Outcome: Progressing  Flowsheets (Taken 12/22/2023 2000)  Verbalizes/displays adequate comfort level or baseline comfort level:   Encourage patient to monitor pain and request assistance   Assess pain using appropriate pain scale   Administer analgesics based on type and severity of pain and evaluate response   Implement non-pharmacological measures as appropriate and evaluate response     Problem: Safety - Adult  Goal: Free from fall injury  12/22/2023 2017 by Myra Wolff RN  Outcome: Progressing  Flowsheets (Taken 12/21/2023 0032 by Oriana Clark RN)  Free From Fall Injury: Instruct family/caregiver on patient safety  12/22/2023 2005 by Lilo Rashid RN  Outcome: Progressing     Problem: Skin/Tissue Integrity  Goal: Absence of new skin breakdown  Description: 1.  Monitor for areas of redness and/or skin breakdown  2.  Assess vascular access sites hourly  3.  Every 4-6 hours minimum:  Change oxygen saturation probe site  4.  Every 4-6 hours:  If on nasal continuous positive airway pressure, respiratory therapy assess nares and determine need for appliance change or resting period.  12/22/2023 2017 by Myra Wolff RN  Outcome: Progressing  12/22/2023 2005 by Lilo Rashid RN  Outcome: Progressing     Problem: Chronic Conditions and Co-morbidities  Goal: Patient's chronic conditions and co-morbidity symptoms are monitored and 
  Problem: Pain  Goal: Verbalizes/displays adequate comfort level or baseline comfort level  12/29/2023 0921 by Jim Ness, RN  Outcome: Progressing     Problem: Safety - Adult  Goal: Free from fall injury  12/29/2023 0921 by Jim Ness, RN  Outcome: Progressing     Problem: Skin/Tissue Integrity  Goal: Absence of new skin breakdown  Description: 1.  Monitor for areas of redness and/or skin breakdown  2.  Assess vascular access sites hourly  3.  Every 4-6 hours minimum:  Change oxygen saturation probe site  4.  Every 4-6 hours:  If on nasal continuous positive airway pressure, respiratory therapy assess nares and determine need for appliance change or resting period.  Outcome: Progressing     Problem: Chronic Conditions and Co-morbidities  Goal: Patient's chronic conditions and co-morbidity symptoms are monitored and maintained or improved  Outcome: Progressing     
  Problem: Pain  Goal: Verbalizes/displays adequate comfort level or baseline comfort level  12/30/2023 1114 by Marta Yuan RN  Outcome: Progressing     Problem: Safety - Adult  Goal: Free from fall injury  12/30/2023 1114 by Marta Yuan RN  Outcome: Progressing     Problem: Skin/Tissue Integrity  Goal: Absence of new skin breakdown  Description: 1.  Monitor for areas of redness and/or skin breakdown  2.  Assess vascular access sites hourly  3.  Every 4-6 hours minimum:  Change oxygen saturation probe site  4.  Every 4-6 hours:  If on nasal continuous positive airway pressure, respiratory therapy assess nares and determine need for appliance change or resting period.  12/30/2023 1114 by Marta Yuan RN  Outcome: Progressing     Problem: Chronic Conditions and Co-morbidities  Goal: Patient's chronic conditions and co-morbidity symptoms are monitored and maintained or improved  12/30/2023 1114 by Marta Yuan RN  Outcome: Progressing     Problem: Discharge Planning  Goal: Discharge to home or other facility with appropriate resources  12/30/2023 1114 by Marta Yuan RN  Outcome: Progressing     Problem: Neurosensory - Adult  Goal: Achieves stable or improved neurological status  Outcome: Progressing  Flowsheets (Taken 12/30/2023 1103)  Achieves stable or improved neurological status: Assess for and report changes in neurological status     Problem: Respiratory - Adult  Goal: Achieves optimal ventilation and oxygenation  Outcome: Progressing     Problem: Cardiovascular - Adult  Goal: Maintains optimal cardiac output and hemodynamic stability  Outcome: Progressing     Problem: Skin/Tissue Integrity - Adult  Goal: Skin integrity remains intact  12/30/2023 1114 by Marta Yuan RN  Outcome: Progressing  Flowsheets (Taken 12/30/2023 1113)  Skin Integrity Remains Intact: Monitor for areas of redness and/or skin breakdown     Problem: Musculoskeletal - Adult  Goal: Return mobility to 
  Problem: Pain  Goal: Verbalizes/displays adequate comfort level or baseline comfort level  12/31/2023 1055 by Marta Yuan RN  Outcome: Progressing     Problem: Safety - Adult  Goal: Free from fall injury  12/31/2023 1055 by Marta Yuan RN  Outcome: Progressing     Problem: Skin/Tissue Integrity  Goal: Absence of new skin breakdown  Description: 1.  Monitor for areas of redness and/or skin breakdown  2.  Assess vascular access sites hourly  3.  Every 4-6 hours minimum:  Change oxygen saturation probe site  4.  Every 4-6 hours:  If on nasal continuous positive airway pressure, respiratory therapy assess nares and determine need for appliance change or resting period.  12/31/2023 1055 by Marta Yuan RN  Outcome: Progressing     Problem: Pain  Goal: Verbalizes/displays adequate comfort level or baseline comfort level  12/31/2023 1055 by Marta Yuan RN  Outcome: Progressing  12/31/2023 0338 by Hollie King RN  Outcome: Progressing     Problem: Chronic Conditions and Co-morbidities  Goal: Patient's chronic conditions and co-morbidity symptoms are monitored and maintained or improved  12/31/2023 1055 by Marta Yuan RN  Outcome: Progressing     Problem: Discharge Planning  Goal: Discharge to home or other facility with appropriate resources  12/31/2023 1055 by Marta Yuan RN  Outcome: Progressing     Problem: Neurosensory - Adult  Goal: Achieves stable or improved neurological status  Outcome: Progressing     Problem: Respiratory - Adult  Goal: Achieves optimal ventilation and oxygenation  Outcome: Progressing     Problem: Cardiovascular - Adult  Goal: Maintains optimal cardiac output and hemodynamic stability  Outcome: Progressing     Problem: Skin/Tissue Integrity - Adult  Goal: Skin integrity remains intact  Outcome: Progressing  Flowsheets (Taken 12/31/2023 1054)  Skin Integrity Remains Intact: Monitor for areas of redness and/or skin breakdown     Problem: 
  Problem: Pain  Goal: Verbalizes/displays adequate comfort level or baseline comfort level  Outcome: Progressing     Problem: Safety - Adult  Goal: Free from fall injury  Outcome: Progressing     Problem: Skin/Tissue Integrity  Goal: Absence of new skin breakdown  Description: 1.  Monitor for areas of redness and/or skin breakdown  2.  Assess vascular access sites hourly  3.  Every 4-6 hours minimum:  Change oxygen saturation probe site  4.  Every 4-6 hours:  If on nasal continuous positive airway pressure, respiratory therapy assess nares and determine need for appliance change or resting period.  Outcome: Progressing     Problem: Chronic Conditions and Co-morbidities  Goal: Patient's chronic conditions and co-morbidity symptoms are monitored and maintained or improved  Outcome: Progressing     Problem: Discharge Planning  Goal: Discharge to home or other facility with appropriate resources  Outcome: Progressing     Problem: Neurosensory - Adult  Goal: Achieves stable or improved neurological status  Outcome: Progressing     Problem: Respiratory - Adult  Goal: Achieves optimal ventilation and oxygenation  Outcome: Progressing     Problem: Cardiovascular - Adult  Goal: Maintains optimal cardiac output and hemodynamic stability  Outcome: Progressing     Problem: Skin/Tissue Integrity - Adult  Goal: Skin integrity remains intact  Outcome: Progressing     Problem: Musculoskeletal - Adult  Goal: Return ADL status to a safe level of function  Outcome: Progressing     Problem: Gastrointestinal - Adult  Goal: Minimal or absence of nausea and vomiting  Outcome: Progressing     Problem: Genitourinary - Adult  Goal: Absence of urinary retention  Outcome: Progressing     Problem: Infection - Adult  Goal: Absence of infection at discharge  Outcome: Progressing     
  Problem: Pain  Goal: Verbalizes/displays adequate comfort level or baseline comfort level  Outcome: Progressing     Problem: Safety - Adult  Goal: Free from fall injury  Outcome: Progressing     Problem: Skin/Tissue Integrity  Goal: Absence of new skin breakdown  Description: 1.  Monitor for areas of redness and/or skin breakdown  2.  Assess vascular access sites hourly  3.  Every 4-6 hours minimum:  Change oxygen saturation probe site  4.  Every 4-6 hours:  If on nasal continuous positive airway pressure, respiratory therapy assess nares and determine need for appliance change or resting period.  Outcome: Progressing     Problem: Chronic Conditions and Co-morbidities  Goal: Patient's chronic conditions and co-morbidity symptoms are monitored and maintained or improved  Outcome: Progressing     Problem: Discharge Planning  Goal: Discharge to home or other facility with appropriate resources  Outcome: Progressing     Problem: Neurosensory - Adult  Goal: Achieves stable or improved neurological status  Outcome: Progressing     Problem: Respiratory - Adult  Goal: Achieves optimal ventilation and oxygenation  Outcome: Progressing     Problem: Cardiovascular - Adult  Goal: Maintains optimal cardiac output and hemodynamic stability  Outcome: Progressing     Problem: Skin/Tissue Integrity - Adult  Goal: Skin integrity remains intact  Outcome: Progressing     Problem: Musculoskeletal - Adult  Goal: Return mobility to safest level of function  Outcome: Progressing  Goal: Return ADL status to a safe level of function  Outcome: Progressing     Problem: Genitourinary - Adult  Goal: Absence of urinary retention  Outcome: Progressing     Problem: Infection - Adult  Goal: Absence of infection at discharge  Outcome: Progressing     Problem: Hematologic - Adult  Goal: Maintains hematologic stability  Outcome: Progressing     
  Problem: Pain  Goal: Verbalizes/displays adequate comfort level or baseline comfort level  Outcome: Progressing  Flowsheets (Taken 12/21/2023 0032)  Verbalizes/displays adequate comfort level or baseline comfort level:   Encourage patient to monitor pain and request assistance   Assess pain using appropriate pain scale   Administer analgesics based on type and severity of pain and evaluate response   Implement non-pharmacological measures as appropriate and evaluate response     Problem: Safety - Adult  Goal: Free from fall injury  Outcome: Progressing  Flowsheets (Taken 12/21/2023 0032)  Free From Fall Injury: Instruct family/caregiver on patient safety     Problem: Skin/Tissue Integrity  Goal: Absence of new skin breakdown  Outcome: Progressing     Problem: Discharge Planning  Goal: Discharge to home or other facility with appropriate resources  Outcome: Progressing  Flowsheets (Taken 12/21/2023 0032)  Discharge to home or other facility with appropriate resources:   Identify barriers to discharge with patient and caregiver   Arrange for needed discharge resources and transportation as appropriate   Identify discharge learning needs (meds, wound care, etc)     Problem: Chronic Conditions and Co-morbidities  Goal: Patient's chronic conditions and co-morbidity symptoms are monitored and maintained or improved  Flowsheets (Taken 12/21/2023 0032)  Care Plan - Patient's Chronic Conditions and Co-Morbidity Symptoms are Monitored and Maintained or Improved:   Monitor and assess patient's chronic conditions and comorbid symptoms for stability, deterioration, or improvement   Collaborate with multidisciplinary team to address chronic and comorbid conditions and prevent exacerbation or deterioration     
  Problem: Pain  Goal: Verbalizes/displays adequate comfort level or baseline comfort level  Outcome: Progressing  Flowsheets (Taken 12/22/2023 2000)  Verbalizes/displays adequate comfort level or baseline comfort level:   Encourage patient to monitor pain and request assistance   Assess pain using appropriate pain scale   Administer analgesics based on type and severity of pain and evaluate response   Implement non-pharmacological measures as appropriate and evaluate response     Problem: Safety - Adult  Goal: Free from fall injury  Outcome: Progressing     Problem: Skin/Tissue Integrity  Goal: Absence of new skin breakdown  Description: 1.  Monitor for areas of redness and/or skin breakdown  2.  Assess vascular access sites hourly  3.  Every 4-6 hours minimum:  Change oxygen saturation probe site  4.  Every 4-6 hours:  If on nasal continuous positive airway pressure, respiratory therapy assess nares and determine need for appliance change or resting period.  Outcome: Progressing               
  Problem: Pain  Goal: Verbalizes/displays adequate comfort level or baseline comfort level  Outcome: Progressing  Flowsheets (Taken 12/23/2023 0815)  Verbalizes/displays adequate comfort level or baseline comfort level:   Encourage patient to monitor pain and request assistance   Assess pain using appropriate pain scale   Administer analgesics based on type and severity of pain and evaluate response     Problem: Safety - Adult  Goal: Free from fall injury  Outcome: Progressing     Problem: Skin/Tissue Integrity  Goal: Absence of new skin breakdown  Description: 1.  Monitor for areas of redness and/or skin breakdown  2.  Assess vascular access sites hourly  3.  Every 4-6 hours minimum:  Change oxygen saturation probe site  4.  Every 4-6 hours:  If on nasal continuous positive airway pressure, respiratory therapy assess nares and determine need for appliance change or resting period.  Outcome: Progressing     Problem: Chronic Conditions and Co-morbidities  Goal: Patient's chronic conditions and co-morbidity symptoms are monitored and maintained or improved  Outcome: Progressing  Flowsheets (Taken 12/23/2023 0824)  Care Plan - Patient's Chronic Conditions and Co-Morbidity Symptoms are Monitored and Maintained or Improved:   Monitor and assess patient's chronic conditions and comorbid symptoms for stability, deterioration, or improvement   Collaborate with multidisciplinary team to address chronic and comorbid conditions and prevent exacerbation or deterioration     Problem: Discharge Planning  Goal: Discharge to home or other facility with appropriate resources  Outcome: Progressing  Flowsheets (Taken 12/23/2023 0824)  Discharge to home or other facility with appropriate resources:   Identify barriers to discharge with patient and caregiver   Arrange for needed discharge resources and transportation as appropriate   Identify discharge learning needs (meds, wound care, etc)     Problem: Neurosensory - Adult  Goal: 
  Problem: Pain  Goal: Verbalizes/displays adequate comfort level or baseline comfort level  Outcome: Progressing  Flowsheets (Taken 12/26/2023 0316)  Verbalizes/displays adequate comfort level or baseline comfort level:   Encourage patient to monitor pain and request assistance   Implement non-pharmacological measures as appropriate and evaluate response   Assess pain using appropriate pain scale   Administer analgesics based on type and severity of pain and evaluate response     Problem: Safety - Adult  Goal: Free from fall injury  Outcome: Progressing  Flowsheets (Taken 12/21/2023 0032 by Oriana Clark RN)  Free From Fall Injury: Instruct family/caregiver on patient safety     Problem: Chronic Conditions and Co-morbidities  Goal: Patient's chronic conditions and co-morbidity symptoms are monitored and maintained or improved  Outcome: Progressing  Flowsheets (Taken 12/26/2023 0316)  Care Plan - Patient's Chronic Conditions and Co-Morbidity Symptoms are Monitored and Maintained or Improved: Monitor and assess patient's chronic conditions and comorbid symptoms for stability, deterioration, or improvement     Problem: Discharge Planning  Goal: Discharge to home or other facility with appropriate resources  Outcome: Progressing  Flowsheets (Taken 12/25/2023 2144)  Discharge to home or other facility with appropriate resources: Identify barriers to discharge with patient and caregiver     Problem: Respiratory - Adult  Goal: Achieves optimal ventilation and oxygenation  Outcome: Progressing  Flowsheets  Taken 12/26/2023 0316  Achieves optimal ventilation and oxygenation:   Assess for changes in respiratory status   Position to facilitate oxygenation and minimize respiratory effort    
  Problem: Pain  Goal: Verbalizes/displays adequate comfort level or baseline comfort level  Outcome: Progressing  Flowsheets (Taken 12/28/2023 0638)  Verbalizes/displays adequate comfort level or baseline comfort level:   Encourage patient to monitor pain and request assistance   Assess pain using appropriate pain scale   Administer analgesics based on type and severity of pain and evaluate response   Implement non-pharmacological measures as appropriate and evaluate response     Problem: Safety - Adult  Goal: Free from fall injury  Outcome: Progressing  Flowsheets (Taken 12/28/2023 0638)  Free From Fall Injury: Instruct family/caregiver on patient safety     Problem: Skin/Tissue Integrity  Goal: Absence of new skin breakdown  Description: 1.  Monitor for areas of redness and/or skin breakdown  2.  Assess vascular access sites hourly  3.  Every 4-6 hours minimum:  Change oxygen saturation probe site  4.  Every 4-6 hours:  If on nasal continuous positive airway pressure, respiratory therapy assess nares and determine need for appliance change or resting period.  Outcome: Progressing     Problem: Chronic Conditions and Co-morbidities  Goal: Patient's chronic conditions and co-morbidity symptoms are monitored and maintained or improved  Outcome: Progressing  Flowsheets (Taken 12/28/2023 0638)  Care Plan - Patient's Chronic Conditions and Co-Morbidity Symptoms are Monitored and Maintained or Improved: Monitor and assess patient's chronic conditions and comorbid symptoms for stability, deterioration, or improvement     Problem: Respiratory - Adult  Goal: Achieves optimal ventilation and oxygenation  Outcome: Progressing  Flowsheets (Taken 12/28/2023 0638)  Achieves optimal ventilation and oxygenation:   Assess for changes in respiratory status   Assess for changes in mentation and behavior   Position to facilitate oxygenation and minimize respiratory effort   Oxygen supplementation based on oxygen saturation or arterial 
  Problem: Pain  Goal: Verbalizes/displays adequate comfort level or baseline comfort level  Outcome: Progressing  Flowsheets (Taken 12/29/2023 0235)  Verbalizes/displays adequate comfort level or baseline comfort level:   Encourage patient to monitor pain and request assistance   Assess pain using appropriate pain scale   Implement non-pharmacological measures as appropriate and evaluate response   Administer analgesics based on type and severity of pain and evaluate response     Problem: Safety - Adult  Goal: Free from fall injury  Outcome: Progressing  Flowsheets (Taken 12/29/2023 0235)  Free From Fall Injury: Instruct family/caregiver on patient safety     Problem: Infection - Adult  Goal: Absence of infection at discharge  Flowsheets (Taken 12/29/2023 0235)  Absence of infection at discharge:   Monitor lab/diagnostic results   Assess and monitor for signs and symptoms of infection   Monitor all insertion sites i.e., indwelling lines, tubes and drains     
  Problem: Pain  Goal: Verbalizes/displays adequate comfort level or baseline comfort level  Outcome: Progressing  Note: Patient's pain is reassessed every 2 hours. Patient currently comfortable and does not appears to be in any pain. Will Continue to monitor.      Problem: Safety - Adult  Goal: Free from fall injury  Note: Bed Alarm On, Patient educated on how to use call light. Fall risk bracelet applied. Patient will be free from falls during hospital stay. Will continue to monitor throughout hospital stay.      
  Problem: Pain  Goal: Verbalizes/displays adequate comfort level or baseline comfort level  Outcome: Progressing  Pain being managed with PRN analgesics per MD orders.  Patient able to express presence and absence of pain and rate pain appropriately using numerical scale.      Problem: Safety - Adult  Goal: Free from fall injury  Outcome: Progressing  Fall risk assessment completed every shift. All precautions in place. Patient has call light with in reach at all times. Room free from clutter. Patient aware to call for assistance when getting up.      Problem: Skin/Tissue Integrity  Goal: Absence of new skin breakdown  Description: 1.  Monitor for areas of redness and/or skin breakdown  2.  Assess vascular access sites hourly  3.  Every 4-6 hours minimum:  Change oxygen saturation probe site  4.  Every 4-6 hours:  If on nasal continuous positive airway pressure, respiratory therapy assess nares and determine need for appliance change or resting period.  Outcome: Progressing  Skin assessment completed every shift. Patient assessed for incontinence, appropriate barrier cream applied prn.  Patient encouraged to turn/rotate every 2 hours.  Assistance provided if patient unable to do so themselves.      Problem: Chronic Conditions and Co-morbidities  Goal: Patient's chronic conditions and co-morbidity symptoms are monitored and maintained or improved  Outcome: Progressing  Patient's heart rate and rhythm, vital signs, and labs are being monitored for changes.      Problem: Discharge Planning  Goal: Discharge to home or other facility with appropriate resources  Outcome: Progressing  Discharge to home or other facility with appropriate resources:   Identify barriers to discharge with patient and caregiver   Identify discharge learning needs (meds, wound care, etc)   Refer to discharge planning if patient needs post-hospital services based on physician order or complex needs related to functional status, cognitive ability 
  Problem: Pain  Goal: Verbalizes/displays adequate comfort level or baseline comfort level  Outcome: Progressing  Pain being managed with PRN analgesics per MD orders.  Patient able to express presence and absence of pain and rate pain appropriately using numerical scale.      Problem: Safety - Adult  Goal: Free from fall injury  Outcome: Progressing  Fall risk assessment completed every shift. All precautions in place. Patient has call light with in reach at all times. Room free from clutter. Patient aware to call for assistance when getting up.      Problem: Skin/Tissue Integrity  Goal: Absence of new skin breakdown  Description: 1.  Monitor for areas of redness and/or skin breakdown  2.  Assess vascular access sites hourly  3.  Every 4-6 hours minimum:  Change oxygen saturation probe site  4.  Every 4-6 hours:  If on nasal continuous positive airway pressure, respiratory therapy assess nares and determine need for appliance change or resting period.  Outcome: Progressing  Skin assessment completed every shift. Patient assessed for incontinence, appropriate barrier cream applied prn.  Patient encouraged to turn/rotate every 2 hours.  Assistance provided if patient unable to do so themselves.      Problem: Chronic Conditions and Co-morbidities  Goal: Patient's chronic conditions and co-morbidity symptoms are monitored and maintained or improved  Outcome: Progressing  Patient's heart rate and rhythm, vital signs, and labs are being monitored for changes.      Problem: Discharge Planning  Goal: Discharge to home or other facility with appropriate resources  Outcome: Progressing  Discharge to home or other facility with appropriate resources:   Identify barriers to discharge with patient and caregiver   Identify discharge learning needs (meds, wound care, etc)   Refer to discharge planning if patient needs post-hospital services based on physician order or complex needs related to functional status, cognitive ability 
Pain/discomfort being managed with PRN analgesics per MD orders. Pt able to express presence and absence of pain and rate pain appropriately using numerical scale.  Fall risk assessment completed every shift. All precautions in place. Pt has call light within reach at all times. Room clear of clutter. Pt aware to call for assistance when getting up.     Intake/Output Summary (Last 24 hours) at 1/1/2024 0715  Last data filed at 1/1/2024 0557  Gross per 24 hour   Intake 1440 ml   Output 775 ml   Net 665 ml     Vitals:    01/01/24 0359   BP: 99/63   Pulse: 81   Resp: 14   Temp: 98 °F (36.7 °C)   SpO2: 95%     Patient assessed for fall risk; fall precautions initiated. Patient and family instructed about safety devices. Environment kept free of clutter and adequate lighting provided.  Bed locked and in lowest position.  Call light within reach. Will continue to monitor.    
ability or social support system   Arrange for needed discharge resources and transportation as appropriate        
normal limits  Outcome: Progressing  Goal: Glucose maintained within prescribed range  Outcome: Progressing     Problem: Hematologic - Adult  Goal: Maintains hematologic stability  Outcome: Progressing

## 2024-01-02 VITALS
HEART RATE: 92 BPM | SYSTOLIC BLOOD PRESSURE: 133 MMHG | HEIGHT: 66 IN | WEIGHT: 233.69 LBS | BODY MASS INDEX: 37.56 KG/M2 | DIASTOLIC BLOOD PRESSURE: 82 MMHG | TEMPERATURE: 97.9 F | RESPIRATION RATE: 18 BRPM | OXYGEN SATURATION: 94 %

## 2024-01-02 LAB
GLUCOSE BLD-MCNC: 116 MG/DL (ref 70–99)
GLUCOSE BLD-MCNC: 157 MG/DL (ref 70–99)
PERFORMED ON: ABNORMAL
PERFORMED ON: ABNORMAL

## 2024-01-02 PROCEDURE — 97535 SELF CARE MNGMENT TRAINING: CPT

## 2024-01-02 PROCEDURE — 2580000003 HC RX 258: Performed by: INTERNAL MEDICINE

## 2024-01-02 PROCEDURE — 6370000000 HC RX 637 (ALT 250 FOR IP): Performed by: NURSE PRACTITIONER

## 2024-01-02 PROCEDURE — 6360000002 HC RX W HCPCS: Performed by: INTERNAL MEDICINE

## 2024-01-02 PROCEDURE — 6370000000 HC RX 637 (ALT 250 FOR IP): Performed by: INTERNAL MEDICINE

## 2024-01-02 PROCEDURE — 97530 THERAPEUTIC ACTIVITIES: CPT

## 2024-01-02 PROCEDURE — 97116 GAIT TRAINING THERAPY: CPT

## 2024-01-02 RX ORDER — INSULIN LISPRO 100 [IU]/ML
4 INJECTION, SOLUTION INTRAVENOUS; SUBCUTANEOUS
DISCHARGE
Start: 2024-01-02

## 2024-01-02 RX ORDER — INSULIN GLARGINE 100 [IU]/ML
20 INJECTION, SOLUTION SUBCUTANEOUS NIGHTLY
Qty: 10 ML | Refills: 3 | DISCHARGE
Start: 2024-01-02

## 2024-01-02 RX ORDER — METRONIDAZOLE 500 MG/1
500 TABLET ORAL EVERY 8 HOURS SCHEDULED
Qty: 45 TABLET | Refills: 0 | DISCHARGE
Start: 2024-01-02 | End: 2024-01-17

## 2024-01-02 RX ORDER — ACETAMINOPHEN 325 MG/1
650 TABLET ORAL EVERY 4 HOURS PRN
Qty: 120 TABLET | Refills: 3
Start: 2024-01-02

## 2024-01-02 RX ADMIN — INSULIN LISPRO 8 UNITS: 100 INJECTION, SOLUTION INTRAVENOUS; SUBCUTANEOUS at 00:04

## 2024-01-02 RX ADMIN — CEFTRIAXONE SODIUM 2000 MG: 2 INJECTION, POWDER, FOR SOLUTION INTRAMUSCULAR; INTRAVENOUS at 08:20

## 2024-01-02 RX ADMIN — METRONIDAZOLE 500 MG: 500 TABLET ORAL at 06:25

## 2024-01-02 ASSESSMENT — PAIN SCALES - WONG BAKER: WONGBAKER_NUMERICALRESPONSE: 0

## 2024-01-02 NOTE — PROGRESS NOTES
Hospitalist Progress Note      Name:  Chucky Gregory /Age/Sex: 1970  (53 y.o. male)   MRN & CSN:  [id number] & [id number] Admission Date/Time: 2023  2:02 PM   Location:  [id number] PCP: Vandana Flowers DO         Hospital Day: 12    Assessment and Plan:   Chucky Gregory is a 53 y.o.  male with past medical history hypertension, hyperlipidemia, DM2 was admitted on 2023 for evaluation of 5-day history of ulceration of the right foot which was progressively worsening.  Reported noncompliance with home medications.  Patient was treated for sepsis secondary to right foot gangrene.  Seen by podiatry/vascular surgery.  Patient underwent right BKA 2023.  Seen by ID, on IV antibiotics.      Assessment    Severe sepsis likely secondary to right foot gas gangrene status post BKA  Group B strep bacteremia  Uncontrolled DM2 hemoglobin A1c 10.9%  Hypertension  Hyperlipidemia    Plan    Continue IV Ceftriaxone /PO Flagyl till 2024  ID evaluation/management appreciated.    Initial blood culture grew staph agalactiae.  Repeat blood cultures negative.  Vascular surgery management appreciated.  S/p right BKA 2023  Continue wound care as per vascular surgery.  S/p Wound vac placement  Hemoglobin A1c 10.9%.  Continue Lantus/insulin sliding scale  PT/OT evaluation appreciated    Disposition-pending vascular surgery recommendations    History of Present Illness:     Chief Complaint: Gangrene of right foot (HCC)  Patient seen and examined this morning.  Reports pain is manageable with current pain medications.  No acute overnight events noted.    Objective:     Intake/Output Summary (Last 24 hours) at 2023 0853  Last data filed at 2023 0635  Gross per 24 hour   Intake 840 ml   Output 3910 ml   Net -3070 ml        Vitals:   Vitals:    23 0421   BP:    Pulse: 78   Resp:    Temp:    SpO2:      Physical Exam:   General-no acute distress  CVS-S1-S2, RRR  Respiratory bilateral 
         Hospitalist Progress Note      Name:  Chucky Gregory /Age/Sex: 1970  (53 y.o. male)   MRN & CSN:  [id number] & [id number] Admission Date/Time: 2023  2:02 PM   Location:  [id number] PCP: Vandana Flowers DO         Hospital Day: 11    Assessment and Plan:   Chucky Gregory is a 53 y.o.  male with past medical history hypertension, hyperlipidemia, DM2 was admitted on 2023 for evaluation of 5-day history of ulceration of the right foot which was progressively worsening.  Reported noncompliance with home medications.  Patient was treated for sepsis secondary to right foot gangrene.  Seen by podiatry/vascular surgery.  Patient underwent right BKA 2023.  Seen by ID, on IV antibiotics.      Assessment    Severe sepsis likely secondary to right foot gas gangrene status post BKA  Group B strep bacteremia  Uncontrolled DM2 hemoglobin A1c 10.9%  Hypertension  Hyperlipidemia    Plan    Continue IV Unasyn  ID evaluation/management appreciated.  Await further recommendations regarding duration of antibiotic course  Initial blood culture grew staph agalactiae.  Repeat blood cultures negative.  Vascular surgery management appreciated.  S/p right BKA 2023  Continue wound care as per vascular surgery.  S/p Wound vac placement  Hemoglobin A1c 10.9%.  Continue Lantus/insulin sliding scale  PT/OT evaluation appreciated    Disposition-pending ID/vascular surgery recommendations    History of Present Illness:     Chief Complaint: Gangrene of right foot (HCC)  Patient seen and examined this morning.  Reports pain is manageable with current pain medications.  No acute overnight events noted.    Objective:     Intake/Output Summary (Last 24 hours) at 2023 1229  Last data filed at 2023 1202  Gross per 24 hour   Intake 1320 ml   Output 3520 ml   Net -2200 ml        Vitals:   Vitals:    23 1153   BP: (!) 142/85   Pulse: 94   Resp: 15   Temp: 98.2 °F (36.8 °C)   SpO2:      Physical Exam: 
         Hospitalist Progress Note      Name:  Chucky Gregory /Age/Sex: 1970  (53 y.o. male)   MRN & CSN:  [id number] & [id number] Admission Date/Time: 2023  2:02 PM   Location:  [id number] PCP: Vandana Flowers DO         Hospital Day: 10    Assessment and Plan:   Chucky Gregory is a 53 y.o.  male with past medical history hypertension, hyperlipidemia, DM2 was admitted on 2023 for evaluation of 5-day history of ulceration of the right foot which was progressively worsening.  Reported noncompliance with home medications.  Patient was treated for sepsis secondary to right foot gangrene.  Seen by podiatry/vascular surgery.  Patient underwent right BKA 2023.  Seen by ID, on IV antibiotics.      Assessment    Severe sepsis likely secondary to right foot gas gangrene status post BKA  Group B strep bacteremia  Uncontrolled DM2 hemoglobin A1c 10.9%  Hypertension  Hyperlipidemia    Plan    Continue IV Unasyn  ID evaluation/management appreciated.  Await further recommendations regarding duration of antibiotic course  Initial blood culture grew staph agalactiae.  Repeat blood cultures negative.  Vascular surgery management appreciated.  S/p right BKA 2023  Continue wound care as per vascular surgery.  Hemoglobin A1c 10.9%.  Continue Lantus/insulin sliding scale  PT/OT evaluation appreciated    Disposition-pending ID/vascular surgery recommendations    History of Present Illness:     Chief Complaint: Gangrene of right foot (HCC)  Patient seen and examined this morning.  Sitting in the chair..  Reports pain is manageable with current pain medications.  No acute overnight events noted.    Objective:     Intake/Output Summary (Last 24 hours) at 2023 0955  Last data filed at 2023 0613  Gross per 24 hour   Intake 1243.25 ml   Output 2305 ml   Net -1061.75 ml        Vitals:   Vitals:    23 0759   BP: (!) 140/92   Pulse:    Resp:    Temp: 97.9 °F (36.6 °C)   SpO2: 97%     Physical 
      Infectious Diseases Inpatient Progress Note    CHIEF COMPLAINT:         Rt foot gangrene with severe pungent odor with Black discoloration of the foot   Sepsis  Wbc ELEVATION   Group B strep bacteremia  DM poor control   Rt BKA guillotine     HISTORY OF PRESENT ILLNESS:  53 y.o. man with a significant history for diabetes, morbid obesity BMI 38, diabetes well-controlled unable to take his insulin, admitted to hospital secondary to ongoing right foot infection with gas gangrene, ongoing changes on the skin, with blistering lesion, ongoing significant malodorous drainage.  Patient indicates he has difficulty controlling his sugar unable to get his insulin, his current living situation is poor, he is living in a camper.  Labs indicate creatinine 1 sodium 124 .8, beta hydroxy uric acid elevated at 6.6 blood glucose 384 WBC 32 hemoglobin 12, blood culture from admission positive for group B strep right foot wound culture positive for group B strep, x-ray right foot with extensive subcutaneous gas concerning for gas gangrene.  Given the ongoing severe sepsis with a limb threatening infection we are consulted for IV antibiotic recommendations  Location : Right leg and right foot pain     Quality : Aching      Severity : 10/10  Duration : Days      Timing : Constant   context :   Diabetic foot infections  Modifying factors : None  Associated signs and symptoms: Fever, chills, right foot discoloration,t pain, malodorous drainage,    Interval History : s/p Rt BKA guillotine amputation and still has on going odor but improving and tolerating meds ok now transferred to progressive care unit follow blood cultures so far negative WBC slowly trending down will likely go for revision of the BKA once the wound is clean outpatient  therapy discussed in detail      Past Medical History:    Past Medical History:   Diagnosis Date    Abscess of hand, left     Cellulitis of left hand 9/27/2021    Diabetes education, encounter 
      Infectious Diseases Inpatient Progress Note    CHIEF COMPLAINT:         Rt foot gangrene with severe pungent odor with Black discoloration of the foot   Wet gangrene  Bacteroides bacteremia    Sepsis  Wbc ELEVATION   Group B strep bacteremia  DM poor control   Rt BKA berto     HISTORY OF PRESENT ILLNESS:  53 y.o. man with a significant history for diabetes, morbid obesity BMI 38, diabetes well-controlled unable to take his insulin, admitted to hospital secondary to ongoing right foot infection with gas gangrene, ongoing changes on the skin, with blistering lesion, ongoing significant malodorous drainage.  Patient indicates he has difficulty controlling his sugar unable to get his insulin, his current living situation is poor, he is living in a camper.  Labs indicate creatinine 1 sodium 124 .8, beta hydroxy uric acid elevated at 6.6 blood glucose 384 WBC 32 hemoglobin 12, blood culture from admission positive for group B strep right foot wound culture positive for group B strep, x-ray right foot with extensive subcutaneous gas concerning for gas gangrene.  Given the ongoing severe sepsis with a limb threatening infection we are consulted for IV antibiotic recommendations  Location : Right leg and right foot pain     Quality : Aching      Severity : 10/10  Duration : Days      Timing : Constant   context :   Diabetic foot infections  Modifying factors : None  Associated signs and symptoms: Fever, chills, right foot discoloration,t pain, malodorous drainage,    Interval History : s/p Rt BKA revision on 12/22 and now has wound vac IPOP and PICC placed for IV abx  - OPAT d/w pt     Past Medical History:    Past Medical History:   Diagnosis Date    Abscess of hand, left     Cellulitis of left hand 9/27/2021    Diabetes education, encounter for     DM (diabetes mellitus) ([facility]) 09/27/2021    Infection due to Enterobacter cloacae     Suppurative tenosynovitis of flexor tendon of left hand        Past 
      Infectious Diseases Inpatient Progress Note    CHIEF COMPLAINT:         Rt foot gangrene with severe pungent odor with Black discoloration of the foot   Wet gangrene  Bacteroides bacteremia    Sepsis  Wbc ELEVATION   Group B strep bacteremia  DM poor control   Rt BKA berto     HISTORY OF PRESENT ILLNESS:  53 y.o. man with a significant history for diabetes, morbid obesity BMI 38, diabetes well-controlled unable to take his insulin, admitted to hospital secondary to ongoing right foot infection with gas gangrene, ongoing changes on the skin, with blistering lesion, ongoing significant malodorous drainage.  Patient indicates he has difficulty controlling his sugar unable to get his insulin, his current living situation is poor, he is living in a camper.  Labs indicate creatinine 1 sodium 124 .8, beta hydroxy uric acid elevated at 6.6 blood glucose 384 WBC 32 hemoglobin 12, blood culture from admission positive for group B strep right foot wound culture positive for group B strep, x-ray right foot with extensive subcutaneous gas concerning for gas gangrene.  Given the ongoing severe sepsis with a limb threatening infection we are consulted for IV antibiotic recommendations  Location : Right leg and right foot pain     Quality : Aching      Severity : 10/10  Duration : Days      Timing : Constant   context :   Diabetic foot infections  Modifying factors : None  Associated signs and symptoms: Fever, chills, right foot discoloration,t pain, malodorous drainage,    Interval History : s/p Rt BKA revision on 12/22 and now has wound vac in place tolerating IV abx ok and OPAT d/w pt about pICC line and placement      Past Medical History:    Past Medical History:   Diagnosis Date    Abscess of hand, left     Cellulitis of left hand 9/27/2021    Diabetes education, encounter for     DM (diabetes mellitus) ([facility]) 09/27/2021    Infection due to Enterobacter cloacae     Suppurative tenosynovitis of flexor tendon 
    V2.0  [facility] [place]      Name:  Chucky Gregory /Age/Sex: 1970  (53 y.o. male)   MRN & CSN:  [id number] & [id number] Encounter Date/Time: 2023 7:45 AM EST    Location:  [id number] PCP: Vandana Flowers DO       Hospital Day: 2    Assessment and Plan:   Chucky Gregory is a 53 y.o. male with PMHx T2DM presenting with gangrenous right foot wound.    Plan:  Gangrene of right foot  -On Vanco and cefepime  -Blood cultures pending  -Podiatry deferred to surgery for amputation.  -N.P.O. for anticipated surgery  -ID consult  Sepsis due to above with tachycardia tachypnea leukocytosis  -Treat as above  Anion gap metabolic acidosis -improved  -Lactate negative  -Improved with IV fluids  T2DM  -Start carb controlled diet when able  -HDSSI and Lantus 10 Units  -Patient previously on metformin and insulin.  Stopped due to insurance reportedly not covering these meds  Hypertension  -Not on meds  -BP stable, continue to monitor    Ppx: Lovenox  Dispo: TBD    Subjective:     Chief Complaint: Foot Injury (Right foot infection. Hx diabetes, no access to insulin.)     Interval Hx:  Patient hungry, reporting some pain in the leg; otherwise no complaints. Denies fevers/chills.  Brief HPI:  53-year-old male with PMH of HTN, HL, T2DM presenting with progressive right foot ulceration over the last 5 days.  Patient has been off his medications since September.  In ED patient found to be septic.  Right foot imaging revealing emphysema and gas gangrene.  Podiatry consulted.  Patient admitted for further eval and management.    Review of Systems:    Review of Systems    10 point ROS negative except as stated above in \"subjective\" section    Objective:     Intake/Output Summary (Last 24 hours) at 2023 0745  Last data filed at 2023 0600  Gross per 24 hour   Intake 2974 ml   Output 700 ml   Net 2274 ml        Vitals:   Vitals:    23 0700   BP: 109/70   Pulse: 97   Resp: 20   Temp:    SpO2: 93% 
  Charlotte Clay MD    Hospitalist Progress Note      Name:  Chucky Gregory /Age/Sex: 1970  (53 y.o. male)   MRN & CSN:  [id number] & [id number] Admission Date/Time: 2023  2:02 PM   Location:  [id number]/1308-01 PCP: Vandana Flowers DO I saw and examined the patient on 2023 at 4:04 PM.    Hospital Day: 3  Barriers to discharge: BKA    Patient Active Problem List   Diagnosis    Elevated C-reactive protein (CRP)    Class 1 obesity due to excess calories with body mass index (BMI) of 34.0 to 34.9 in adult    Weight loss counseling, encounter for    DMII (diabetes mellitus, type 2) (HCC)    Obesity, diabetes, and hypertension syndrome (HCC)    Flexor tenosynovitis of finger    Gangrene of right foot (HCC)    Sepsis (HCC)    Tachycardia    Tachypnea    Neutrophilic leukocytosis    Hyponatremia    Morbid obesity due to excess calories (HCC)    Essential hypertension    Hyperlipidemia    High anion gap metabolic acidosis    Diabetic ulcer of toe of right foot associated with type 2 diabetes mellitus, with necrosis of muscle (HCC)    Bacteremia due to group B Streptococcus    Diabetic wet gangrene of the foot (HCC)    Diabetic ketoacidosis without coma associated with type 2 diabetes mellitus (HCC)    Gangrene of foot (HCC)          Assessment and Plan:   Patient is a 53-year-old male with diabetes, hypertension, homeless, noncompliant with medication, presenting with wet gangrene of right foot with sepsis.    With gangrene of right foot s/p below-knee amputation, guillotine amputation will need formalization, orthopedic surgery following.  Continue Zosyn, ID following.  Sepsis with tachycardia, tachypnea and leukocytosis due to wet gangrene, on Zosyn and Flagyl.   Diabetes mellitus type 2, uncontrolled on Lantus with SSI  Hypertension associated with diabetes  Homelessness     Subjective:     Seen and examined, incisional pain, well-controlled, no chest pain or shortness of breath.    Objective: 
  Charlotte Clay MD    Hospitalist Progress Note      Name:  Chucky Gregory /Age/Sex: 1970  (53 y.o. male)   MRN & CSN:  [id number] & [id number] Admission Date/Time: 2023  2:02 PM   Location:  [id number] PCP: Vandana Flowers DO I saw and examined the patient on 2023 at 5:17 PM.    Hospital Day: 5  Barriers to discharge: BKA    Patient Active Problem List   Diagnosis    Elevated C-reactive protein (CRP)    Class 1 obesity due to excess calories with body mass index (BMI) of 34.0 to 34.9 in adult    Weight loss counseling, encounter for    DMII (diabetes mellitus, type 2) (HCC)    Obesity, diabetes, and hypertension syndrome (HCC)    Flexor tenosynovitis of finger    Gangrene of right foot (HCC)    Sepsis (HCC)    Tachycardia    Tachypnea    Neutrophilic leukocytosis    Hyponatremia    Morbid obesity due to excess calories (HCC)    Essential hypertension    Hyperlipidemia    High anion gap metabolic acidosis    Diabetic ulcer of toe of right foot associated with type 2 diabetes mellitus, with necrosis of muscle (HCC)    Bacteremia due to group B Streptococcus    Diabetic wet gangrene of the foot (HCC)    Diabetic ketoacidosis without coma associated with type 2 diabetes mellitus (HCC)    Gangrene of foot (HCC)          Assessment and Plan:   Patient is a 53-year-old male with diabetes, hypertension, homeless, noncompliant with medication, presenting with wet gangrene of right foot with sepsis.    With gangrene of right foot s/p below-knee amputation, guillotine amputation will need formalization, orthopedic surgery following.  Continue Zosyn, ID following.  Septicemia with tachycardia, tachypnea and leukocytosis.,  Blood culture grew Streptococcus agalactiae, repeat cultures negative.   Diabetes mellitus type 2, uncontrolled on Lantus with SSI, increase Lantus dose  Hypertension associated with diabetes  Homelessness     Subjective:     Seen and examined, sitting in chair, right BKA pain 
  Hospital Medicine Progress Note      Date of Admission: 12/17/2023  Hospital Day: 7    Chief Admission Complaint:  right foot ulcer      Subjective:         S/p amputation yesterday. Pt reports pain is controlled. Having frequent  non bloody diarrhea. Denies any abd pain, N/V        Presenting Admission History:       Pt is an 53 y.o. year-old male with a history that includes hypertension, hyperlipidemia and type II diabetes mellitus.  He presents to the emergency room for evaluation following a 5-day history of an ulceration on the right foot which has been progressively worsening.  He states that he has stopped taking his medications, including medications for diabetes.  He states that he was on metformin but stopped taking that in September of this year.  He states that he has been prescribed insulin in the past but does not take it because his insurance is not covering it.  In the emergency room he was found to have a right foot ulcer with associated sepsis.  Imaging revealed tissue emphysema and gas gangrene.  The emergency room provider discussed these findings with podiatry and they said that they plan to follow-up with him in the morning. Associated signs and symptoms do not include fever or chills, nausea, vomiting, abdominal pain, hemoptysis, hematochezia, diarrhea, constipation or urinary symptoms.  He is being admitted for further evaluation and treatment.      Assessment/Plan:      Current Principal Problem:  Gangrene of right foot (HCC)    Gangrene of rt foot : s/p BKA. Continue IV zosyn per ID .     Leucocytosis: likely due to infection but  post surgical response given surgery yesterday contributing however having diarrhea, check C diff. Monitor       Bacteremia:  Blood culture grew Streptococcus agalactiae, repeat cultures negative.     Diabetes mellitus type 2:  uncontrolled. HBA1c 10.9. Continue Lantus with SSI    Diarrhea : check C diff as on abx,. If negative, can start imodium       Physical 
4 Eyes Skin Assessment     NAME:  Chucky Gregory  YOB: 1970  MEDICAL RECORD NUMBER:  [id number]    The patient is being assessed for  Admission    I agree that at least one RN has performed a thorough Head to Toe Skin Assessment on the patient. ALL assessment sites listed below have been assessed.      Areas assessed by both nurses:    Head, Face, Ears, Shoulders, Back, Chest, Arms, Elbows, Hands, Sacrum. Buttock, Coccyx, Ischium, and Legs. Feet and Heels        Does the Patient have a Wound? Yes wound(s) were present on assessment. LDA wound assessment was Initiated and completed by RN (not pressure related)       Saúl Prevention initiated by RN: No  Wound Care Orders initiated by RN: No    Pressure Injury (Stage 3,4, Unstageable, DTI, NWPT, and Complex wounds) if present, place Wound referral order by RN under : No    New Ostomies, if present place, Ostomy referral order under : No     Nurse 1 eSignature: Electronically signed by Campbell Bernard RN on 12/17/23 at 7:15 PM EST    **SHARE this note so that the co-signing nurse can place an eSignature**    Nurse 2 eSignature: Electronically signed by Vandana Chavez RN on 12/18/23 at 6:04 AM EST   
4 Eyes Skin Assessment     NAME:  Chucky Gregory  YOB: 1970  MEDICAL RECORD NUMBER:  [id number]    The patient is being assessed for  Transfer to New Unit    I agree that at least one RN has performed a thorough Head to Toe Skin Assessment on the patient. ALL assessment sites listed below have been assessed.      Areas assessed by both nurses:    Head, Face, Ears, Shoulders, Back, Chest, Arms, Elbows, Hands, Sacrum. Buttock, Coccyx, Ischium, and Legs. Feet and Heels        Does the Patient have a Wound? No noted wound(s)  Buttocks red  Surgical incision to CAROLINA [place] Prevention initiated by RN: No  Wound Care Orders initiated by RN: Yes    Pressure Injury (Stage 3,4, Unstageable, DTI, NWPT, and Complex wounds) if present, place Wound referral order by RN under : No    New Ostomies, if present place, Ostomy referral order under : No     Nurse 1 eSignature: Electronically signed by Julia Cole RN on 12/20/23 at 5:36 PM EST    **SHARE this note so that the co-signing nurse can place an eSignature**    Nurse 2 eSignature: Electronically signed by Jessi Gerber RN on 12/20/23 at 5:44 PM EST   
[employer]  Applied IPOP cast. Plan to bivalve cast tomorrow.  Shelton Ching [phone]   
Alert and oriented. Agreeable to procedure.  
Arrived to place PICC line with bedside RN SEBASTIAN. Pre-procedure and timeout done with RUBEN ivy, discussed limitations of placement and allergies.      Pt's right basilic  are all easily collapsible with no indication for a clot. Vein selected is large enough for catheter. Pt tolerated sterile procedure well, with no difficulty accessing basilic vein, when accessed - blood was free flowing and non-pulsatile. Guidewire, introducer, and catheter went in smoothly. PICC line verified with 3CG technology with peaked P-waves (please see image below).     Nurses:  OK to use PICC.    Please replace all existing IV tubing with new IV tubing prior to using the PICC for current IV infusions.  Please remove any PIVs from PICC arm.  Please refrain from obtaining BPs in the arm with a PICC.  All of the above may be sources of infection or damage to the PICC line/site.     Post procedure - reorganized pt table, placed pt in lowest position, with call light and educated on line care. Reported off to bedside RN.     Please call if you have any questions about the PICC or ML. The  will direct you to the PICC RN that is on call.     [phone]          
Attempted to call [facility] to call report with no answer. Will attempt to call again before patient DC.     
Blood culture #1 positive for Group B Strepp. Hospitalist, Dr KAYCEE Mata, notified via secure messaging. No new orders at this time.  
Changed dressing with 4x4 iodine and kerlex.     Electronically signed by Magdalena Candelaria RN on 12/18/2023 at 07:15 PM    
Clinical Pharmacy Note  Vancomycin Consult    Chucky Gregory is a 53 y.o. male ordered vancomycin for SSTI; consult received from Dr. monzon to manage therapy. Also receiving cefepime.    Allergies:  Atorvastatin and Metformin and related     Temp max:  Temp (24hrs), Av.2 °F (37.3 °C), Min:99.2 °F (37.3 °C), Max:99.2 °F (37.3 °C)      Recent Labs     23  1429   WBC 32.0*       Recent Labs     23  1429   BUN 30*   CREATININE 1.0         Intake/Output Summary (Last 24 hours) at 2023 1902  Last data filed at 2023 1549  Gross per 24 hour   Intake  ml   Output --   Net  ml       Culture Results:      Ht Readings from Last 1 Encounters:   23 1.676 m (5' 6\")        Wt Readings from Last 1 Encounters:   23 105 kg (231 lb 7.7 oz)         Estimated Creatinine Clearance: 97 mL/min (based on SCr of 1 mg/dL).    Assessment/Plan:  Day # 1 of vancomycin.  Vanco 2gm x1 then  Vancomycin 1250 mg IV every 12 hours.    Goal -600  Predicted       Thank you for the consult.    
Clinical Pharmacy Note  Vancomycin Consult    Chucky Gregory is a 53 y.o. male ordered vancomycin for diabetic foot infection / sepsis / dry gangrene; consult received from Dr. monzon to manage therapy. Also receiving cefepime/metronidazole.    Allergies:  Atorvastatin and Metformin and related     Temp max:  Temp (24hrs), Av.9 °F (37.2 °C), Min:98.2 °F (36.8 °C), Max:99.2 °F (37.3 °C)      Recent Labs     23  1429 23  0358   WBC 32.0* 26.3*         Recent Labs     23  1429 23  0358   BUN 30* 23*   CREATININE 1.0 0.8*           Intake/Output Summary (Last 24 hours) at 2023 1347  Last data filed at 2023 1150  Gross per 24 hour   Intake 5009.51 ml   Output 1125 ml   Net 3884.51 ml         Culture Results:      Ht Readings from Last 1 Encounters:   23 1.676 m (5' 6\")        Wt Readings from Last 1 Encounters:   23 107.2 kg (236 lb 5.3 oz)         Estimated Creatinine Clearance: 123 mL/min (A) (based on SCr of 0.8 mg/dL (L)).    Assessment/Plan:  Day # 2 of Vancomycin.  Current regimen: 1,250 mg IVPB Q12H  Vancomycin level = 15.5 ug/mL  Calculated AUC = 494    Continue Vancomycin as ordered    Thank you for the consult.  Will continue to follow.    Hardy Anguiano, FAUZIA, PharmD, BCPS  2023 1:48 PM    
Comprehensive Nutrition Assessment    Type and Reason for Visit:  RD Nutrition Re-Screen/LOS    Nutrition Recommendations/Plan:   Continue current diet.   Stop glucerna TID.   Start Dez BID.      Malnutrition Assessment:  Malnutrition Status:  No malnutrition (12/26/23 1153)    Context:  Chronic Illness       Nutrition Assessment:    RD length of stay assessment. Pt. admitted for gangrene of Rt. foot. PMH includes DMII, HTN, diabetic ulcer of Rt. foot. Pt. 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 Currently receiving a 4 carb diet. Meal intakes appear adequate. Supplements include GlucernaTID. Supplement acceptance TBD. Recommend stopping glucerna and starting Dez. Diet acceptance is good so high calorie supplement may not be needed. WIll continue to monitor diet acceptance and nutritional adequacy.    Nutrition Related Findings:    Nutrition related labs reviewed. LBM today. Wound Type: Stage II       Current Nutrition Intake & Therapies:    Average Meal Intake: %, 1-25%  Average Supplements Intake: Unable to assess  ADULT DIET; Regular; 4 carb choices (60 gm/meal)  ADULT ORAL NUTRITION SUPPLEMENT; Breakfast, Lunch, Dinner; Diabetic Oral Supplement    Anthropometric Measures:  Height: 167.6 cm (5' 5.98\")  Ideal Body Weight (IBW): 142 lbs (65 kg)       Current Body Weight: 102.6 kg (226 lb 3.1 oz), 159.3 % IBW.    Current BMI (kg/m2): 36.5                          BMI Categories: Obese Class 2 (BMI 35.0 -39.9)    Estimated Daily Nutrient Needs:  Energy Requirements Based On: Kcal/kg  Weight Used for Energy Requirements: Current  Energy (kcal/day): 1545-2060kcal (15-20kcal/kg)  Weight Used for Protein Requirements: Current  Protein (g/day): 129-155g (1.25-1.5g/kg)  Method Used for Fluid Requirements: 1 ml/kcal  Fluid (ml/day): 480-5160mL    Nutrition Diagnosis:   Increased nutrient needs related to increase demand for energy/nutrients as evidenced by wounds    Nutrition Interventions:   Food and/or Nutrient Delivery: 
Dressing to RLE dry and intact. Wound vac intact.Pt compliant with elevating RLE. Pt denies any pain or discomfort at this time.  
Hospitalist Progress Note  1/1/2024 11:45 AM  Subjective:   Admit Date: 12/17/2023  PCP: Vandana Flowers, DO Status: Inpatient   Interval History: Hospital Day: 16, awaiting pre-cert for post-acute.       History of present illness:  Admitted with severe sepsis and DKA with right diabetic foot infection with gas gangrene requiring ICU. Strep agalactiae (Beta Strep Group B) on wound and blood culture with antibiotic therapy with ceftriaxone and metronidazole per infectious disease.  Right lower extremity below-the-knee amputation (12/19) with revision (12/22, Dr Terrell). foot amputation (12/19, Dr Terrell).       History of present illness: Difficulty controlling his sugar unable to get his insulin, his current living situation is poor, he is living in a camper.  HgbA1c 10.9%.      Diet: controlled carbohydrate (60 gm/meal)  Right basilic single lumen PICC (12/27, day #6)  Right lower extremity close suction drain (12/22, day #11)  External urinary catheter (12/17, day #16)  Medications:     ceftriaxone   2,000 mg IntraVENous Q24H   metronidazole  500 mg Oral 3 times per day   insulin glargine  20 Units SubCUTAneous Nightly   insulin lispro SSI  3 + 0-16 Units SubCUTAneous TID WC     Recent Labs     12/31/23  1000      K 4.2      CO2 28   BUN 22*   CREATININE 0.8*   GLUCOSE 140*     Procalcitonin (12/19) 0.59 ng/mL  Blood culture x 2 (12/19) no growth  CRP (12/17) 602, (12/19) 394 mg/L  ESR (12/17) 114 mm/hr  Beta-hydroxybutyrate (12/17) 6.67 mmol/L  HgbA1c (12/19) 10.9%  Lactate (12/17) 1.8 / 1.5 mmol/L  Magnesium (12/31) 2.00 mg/dL  Albumin (12/31) 3.1 g/dL     Wound culture (12/17) Strep agalactiae (Beta Strep Group B) heavy growth   Blood culture PCR (12/17) Strep agalactiae (Beta Strep Group B)   Blood culture (12/17) Bacteroides pyogenes    POC Glucose:   Recent Labs     12/31/23 2047 01/01/24  0009 01/01/24  0359 01/01/24  0830   POCGLU 194* 135* 135* 112*     CT right foot w/ IV contrast (12/17) 
Hospitalist Progress Note  12/31/2023 11:40 AM  Subjective:   Admit Date: 12/17/2023  PCP: Vandana Flowers, DO Status: Inpatient   Interval History: Hospital Day: 15, awaiting pre-cert for post-acute.      History of present illness:  Admitted with severe sepsis and DKA with right diabetic foot infection with gas gangrene requiring ICU. Strep agalactiae (Beta Strep Group B) on wound and blood culture with antibiotic therapy with ceftriaxone and metronidazole per infectious disease.  Right lower extremity below-the-knee amputation (12/19) with revision (12/22, Dr Terrell). foot amputation (12/19, Dr Terrell).       History of present illness: Difficulty controlling his sugar unable to get his insulin, his current living situation is poor, he is living in a camper.  HgbA1c 10.9%.      Diet: controlled carbohydrate (60 gm/meal)  Right basilic single lumen PICC (12/27, day #5)  Right lower extremity close suction drain (12/22, day #10)  External urinary catheter (12/17, day #15)  Medications:     ceftriaxone   2,000 mg IntraVENous Q24H   metronidazole  500 mg Oral 3 times per day   insulin glargine  20 Units SubCUTAneous Nightly   insulin lispro  3 + 0-16 Units SubCUTAneous TID WC     Recent Labs     12/29/23  0551   WBC 12.3*   HGB 10.0*      MCV 89.1     Recent Labs     12/29/23  0551 12/31/23  1000    136   K 4.4 4.2    101   CO2 30 28   BUN 21* 22*   CREATININE 0.7* 0.8*   GLUCOSE 154* 140*     Procalcitonin (12/19) 0.59 ng/mL  Blood culture x 2 (12/19) no growth  CRP (12/17) 602, (12/19) 394 mg/L  ESR (12/17) 114 mm/hr  Beta-hydroxybutyrate (12/17) 6.67 mmol/L  HgbA1c (12/19) 10.9%  Lactate (12/17) 1.8 / 1.5 mmol/L  Magnesium (12/31) 2.00 mg/dL  Albumin (12/31) 3.1 g/dL     Wound culture (12/17) Strep agalactiae (Beta Strep Group B) heavy growth   Blood culture PCR (12/17) Strep agalactiae (Beta Strep Group B)   Blood culture (12/17) Bacteroides pyogenes     POC Glucose:   Recent Labs     
No new issues.  Wound vac and IPOP in place.  Awaiting placement.   
Notified vascular surgeon on call of recent fever of patient. Dr. Terrell wanted to be notified. Awaiting call back.     Electronically signed by Magdalena Candelaria RN on 12/19/2023 at 7:45 AM    
Occupational Therapy    Attempted OT follow up.  Pt currently out of the room for a R BKA.  Will need new orders to resume therapy when appropriate.    Jasbir Pineda, SHORE/L [id number]    
Occupational Therapy  Facility/Department: [address] PROGRESSIVE CARE  Occupational Therapy Treatment and Tentative D/C      Name: Chucky Gregory  : 1970  MRN: [id number]  Date of Service: 2023    Discharge Recommendations: Chucky Gregory scored a 16/24 on the AM-PAC ADL Inpatient form. Current research shows that an AM-PAC score of 17 or less is typically not associated with a discharge to the patient's home setting.     If patient discharges prior to next session this note will serve as a discharge summary.  Please see below for the latest assessment towards goals.     Patient would benefit from continued therapy after discharge, 5-7 sessions per week, 3-5 sessions per week  OT Equipment Recommendations  Equipment Needed: No       Patient Diagnosis(es): The primary encounter diagnosis was Diabetic ulcer of toe of right foot associated with type 2 diabetes mellitus, with necrosis of muscle (HCC). Diagnoses of Type 2 diabetes mellitus with foot ulcer, unspecified whether long term insulin use (HCC), Goals of care, counseling/discussion, Leukocytosis, unspecified type, Thrombocytosis, Hyponatremia, Hyperglycemia, Elevated erythrocyte sedimentation rate, Elevated C-reactive protein (CRP), Gangrene of foot (HCC), and Gangrene of right foot (HCC) were also pertinent to this visit.  Past Medical History:  has a past medical history of Abscess of hand, left, Cellulitis of left hand, Diabetes education, encounter for, DM (diabetes mellitus) (HCC), Infection due to Enterobacter cloacae, and Suppurative tenosynovitis of flexor tendon of left hand.  Past Surgical History:  has a past surgical history that includes hernia repair; Finger surgery (Left, 2021); Hand surgery (Left, 2021); Leg amputation below knee (Right, 2023); and Leg amputation below knee (Right, 2023).           Assessment   Performance deficits / Impairments: Decreased functional mobility ;Decreased ADL status;Decreased 
Occupational Therapy  [address]  Occupational Therapy Initial Assessment    Name: Chucky Gregory  : 1970  MRN: [id number]  Date of Service: 2023    Discharge Recommendations:  3-5 sessions per week, Patient would benefit from continued therapy after discharge  OT Equipment Recommendations  Other: TBD by d/c site    Chucky Gergory scored a 14/24 on the AM-PAC ADL Inpatient form. Current research shows that an AM-PAC score of 17 or less is typically not associated with a discharge to the patient's home setting. Based on the patient's AM-PAC score and their current ADL deficits, it is recommended that the patient have 3-5 sessions per week of Occupational Therapy at d/c to increase the patient's independence.  Please see assessment section for further patient specific details.    If patient discharges prior to next session this note will serve as a discharge summary.  Please see below for the latest assessment towards goals.         Patient Diagnosis(es): The primary encounter diagnosis was Diabetic ulcer of toe of right foot associated with type 2 diabetes mellitus, with necrosis of muscle (HCC). Diagnoses of Type 2 diabetes mellitus with foot ulcer, unspecified whether long term insulin use (HCC), Goals of care, counseling/discussion, Leukocytosis, unspecified type, Thrombocytosis, Hyponatremia, Hyperglycemia, Elevated erythrocyte sedimentation rate, Elevated C-reactive protein (CRP), and Gangrene of foot (HCC) were also pertinent to this visit.  Past Medical History:  has a past medical history of Abscess of hand, left, Cellulitis of left hand, Diabetes education, encounter for, DM (diabetes mellitus) (HCC), Infection due to Enterobacter cloacae, and Suppurative tenosynovitis of flexor tendon of left hand.  Past Surgical History:  has a past surgical history that includes hernia repair; Finger surgery (Left, 2021); Hand surgery (Left, 2021); and Leg amputation below knee 
Occupational Therapy  Facility/Department: [address] PROGRESSIVE CARE  Occupational Therapy re-evaluation and Tentative D/C      Name: Chucky Gregory  : 1970  MRN: [id number]  Date of Service: 2023    Discharge Recommendations: Chucky Gregory scored a 16/24 on the AM-PAC ADL Inpatient form. Current research shows that an AM-PAC score of 17 or less is typically not associated with a discharge to the patient's home setting. Based on the patient's AM-PAC score and their current ADL deficits, it is recommended that the patient have 5-7 sessions per week of Occupational Therapy at d/c to increase the patient's independence.  At this time, this patient demonstrates complex nursing, medical, and rehabilitative needs, and would benefit from intensive rehabilitation services upon discharge from the Inpatient setting.  This patient demonstrates the ability to participate in and benefit from an intensive therapy program with a coordinated interdisciplinary team approach to foster frequent, structured, and documented communication among disciplines, who will work together to establish, prioritize, and achieve treatment goals. Please see assessment section for further patient specific details.    If patient discharges prior to next session this note will serve as a discharge summary.  Please see below for the latest assessment towards goals.     3-5 sessions per week, Patient would benefit from continued therapy after discharge, 5-7 sessions per week  OT Equipment Recommendations  Equipment Needed: No       Patient Diagnosis(es): The primary encounter diagnosis was Diabetic ulcer of toe of right foot associated with type 2 diabetes mellitus, with necrosis of muscle (HCC). Diagnoses of Type 2 diabetes mellitus with foot ulcer, unspecified whether long term insulin use (HCC), Goals of care, counseling/discussion, Leukocytosis, unspecified type, Thrombocytosis, Hyponatremia, Hyperglycemia, Elevated erythrocyte sedimentation 
PICC dressing changed to right arm. Intact flushing with brisk blood return. No complications noted.  
PT received from OR. pt placed on monitor, pt presents with sinus rhythm. Pt on 2 L of oxygen via nasal cannula. Pt james score is 8 at the time of handoff. Handoff preformed with OR RN.    Electronically signed by Magdalena Candelaria RN on 12/19/2023 at 12:15 PM    
Patient NPO at midnight for surgery in the AM. Educated patient and removed fluids from bedside table. Will continue to monitor.   
Patient admitted to CVU 1308. Afebrile. /77. He is sinus tachy with a HR of 113. O2 is 97% on RA. The right foot is currently wrapped with kerlix, the leg and foot are red and inflamed, and the patient is complaining of \"a little bit\" of pain in that foot. The pt is A&Ox4. Hospitalist is at bedside and states that podiatry will see the patient in the AM. Call light within reach. No other stated or anticipated pt needs at this time.  
Patient admitted to PACU # 12 from OR at 1225 post RIGHT BELOW KNEE AMPUTATION - Right  per Dr Terrell.  Attached to PACU monitoring system and report received from anesthesia provider.  Patient was reported to be hemodynamically stable during procedure.  Patient drowsy on admission and denied pain.  
Patient arrived back from OR to PCU this afternoon. VS stable. Patient complains of 6/10 pain to RLE. Scheduled Toradol given. Patient stated that Toradol was effective. 30 mL of bloody drainage emptied from AMY drain. Dressing is clean, dry, and intact. Patient is currently sitting comfortably in bed watching television. Call button within reach, bed alarm on. Care ongoing.   
Patient having frequent bowel movements throughout the day today. Patient requested something to assist in stopping these frequent bowel movements. RN messaged MD Jfef via [employer] regarding patient's complaints. MD ordered C-DIFF sample to be collected. Upon collection, stool sample was soft/formed and did not meet order criteria. RN to notify MD.     MD aware, PRN Imodium ordered.  
Patient transported to room 5264 via bed, patient belongings brought to room, supplies for 6am dressing change provided, handoff given, receiving RN at bedside.  
Patient's aunt, Paula, called to get updates. Confirmed with patient it was ok to provide updates to aunt.    Electronically signed by Lidia Harden RN on 12/26/2023 at 11:27 AM    
Physical Therapy    Pt s/p R BKA, revision of R Open Guillotine Amputation.  Please reconsult PT when approp.  Thank you, Melisa Hill, PT  
Physical Therapy  Facility/Department: [address] PROGRESSIVE CARE  Physical Therapy Treatment Note    Name: Chucky Gregory  : 1970  MRN: [id number]  Date of Service: 2023    Discharge Recommendations:  Continue to assess pending progress, Subacute/Skilled Nursing Facility   PT Equipment Recommendations  Other: Will monitor for potential equipt needs.  Chucky Gregory scored a  on the AM-PAC short mobility form. Current research shows that an AM-PAC score of 17 or less is typically not associated with a discharge to the patient's home setting. Based on the patient's AM-PAC score and their current functional mobility deficits, it is recommended that the patient have 3-5 sessions per week of Physical Therapy at d/c to increase the patient's independence.  Please see assessment section for further patient specific details.    If patient discharges prior to next session this note will serve as a discharge summary.  Please see below for the latest assessment towards goals.            Assessment   Body Structures, Functions, Activity Limitations Requiring Skilled Therapeutic Intervention: Decreased functional mobility ;Decreased strength;Decreased safe awareness;Decreased endurance;Decreased balance;Increased pain  Assessment: 52 y/o male admit 2023 with Gangrene R Foot, Sepsis, DKA. 2023 S/P R Above Ankle Guillotine Amp. Vasc Surg cont follow for eventual conversion to BKA/Closure.  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  At this time, pt requiring Min assist x 2 via Stedy from eob and recliner.  Able to complete few transfers to/from seat of Stedy with Min assist.  Cont with B LE Strength Exs, UE chair push-ups. Would recommend cont PT (3-5) upon d/c.  Will cont to monitor pt's progress.  Therapy Prognosis: Fair;Good  History: 52 y/o male admit 2023 with Gangrene R Foot, Sepsis, DKA. 
Physical Therapy  Facility/Department: [address] PROGRESSIVE CARE  Physical Therapy Treatment Note    Name: Chucky Gregory  : 1970  MRN: [id number]  Date of Service: 2023    Discharge Recommendations:  Continue to assess pending progress, Subacute/Skilled Nursing Facility   PT Equipment Recommendations  Other: Will monitor for potential equipt needs.  Chucky Gregory scored a  on the AM-PAC short mobility form. Current research shows that an AM-PAC score of 17 or less is typically not associated with a discharge to the patient's home setting. Based on the patient's AM-PAC score and their current functional mobility deficits, it is recommended that the patient have 3-5 sessions per week of Physical Therapy at d/c to increase the patient's independence.  Please see assessment section for further patient specific details.    If patient discharges prior to next session this note will serve as a discharge summary.  Please see below for the latest assessment towards goals.            Assessment   Body Structures, Functions, Activity Limitations Requiring Skilled Therapeutic Intervention: Decreased functional mobility ;Decreased strength;Decreased safe awareness;Decreased endurance;Decreased balance;Increased pain  Assessment: 54 y/o male admit 2023 with Gangrene R Foot, Sepsis, DKA. 2023 S/P R Above Ankle Guillotine Amp. 2023 S/P Revision R BKA.   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 Currently, pt able to complete several Transfers with Walker Min assist; attempts to hop a few steps in place with each stand attempt although unable to amb this date.  Cont with B LE Strength Exs, UE chair push-ups.  Pt reports no sign assist/support; given less than optimal home setting/set-up  would recommend cont PT (3-5) upon d/c.  Will cont to monitor pt's progress.  Therapy Prognosis: 
Physical Therapy  Facility/Department: [address] PROGRESSIVE CARE  Physical Therapy Treatment Note    Name: Chucky Gregory  : 1970  MRN: [id number]  Date of Service: 2023    Discharge Recommendations:  Continue to assess pending progress, Subacute/Skilled Nursing Facility   PT Equipment Recommendations  Other: Will monitor for potential equipt needs.  Chucky Gregory scored a 14/24 on the AM-PAC short mobility form. Current research shows that an AM-PAC score of 17 or less is typically not associated with a discharge to the patient's home setting. Based on the patient's AM-PAC score and their current functional mobility deficits, it is recommended that the patient have 3-5 sessions per week of Physical Therapy at d/c to increase the patient's independence.  Please see assessment section for further patient specific details.    If patient discharges prior to next session this note will serve as a discharge summary.  Please see below for the latest assessment towards goals.            Assessment   Body Structures, Functions, Activity Limitations Requiring Skilled Therapeutic Intervention: Decreased functional mobility ;Decreased strength;Decreased safe awareness;Decreased endurance;Decreased balance;Increased pain  Assessment: 52 y/o male admit 2023 with Gangrene R Foot, Sepsis, DKA. 2023 S/P R Above Ankle Guillotine Amp. 2023 S/P Revision R BKA.   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 Currently, Pt reports increase discomfort/press assoc with recent placement of IPOP.  Able to initiate bed to chair with Walker hopping few steps on RLE.  Able to dominick additional Dynamic Stand Activities with Walker CGA/Min assist.  Cont with B LE Strength Exs, UE chair push-ups.  Pt reports no sign assist/support; given less than optimal home setting/set-up  would recommend cont PT (3-5) upon d/c.  Will 
Physical Therapy  Facility/Department: [address] PROGRESSIVE CARE  Physical Therapy Treatment Note    Name: Chucky Gregory  : 1970  MRN: [id number]  Date of Service: 2023    Discharge Recommendations:  Continue to assess pending progress, Subacute/Skilled Nursing Facility   PT Equipment Recommendations  Other: Will monitor for potential equipt needs.      Chucky Gregory scored a  on the AM-PAC short mobility form. Current research shows that an AM-PAC score of 17 or less is typically not associated with a discharge to the patient's home setting. Based on the patient's AM-PAC score and their current functional mobility deficits, it is recommended that the patient have 3-5 sessions per week of Physical Therapy at d/c to increase the patient's independence.  Please see assessment section for further patient specific details.    If patient discharges prior to next session this note will serve as a discharge summary.  Please see below for the latest assessment towards goals.    Assessment   Body Structures, Functions, Activity Limitations Requiring Skilled Therapeutic Intervention: Decreased functional mobility ;Decreased strength;Decreased safe awareness;Decreased endurance;Decreased balance;Increased pain  Assessment: 54 y/o male admit 2023 with Gangrene R Foot, Sepsis, DKA. 2023 S/P R Above Ankle Guillotine Amp. 2023 S/P Revision R BKA.   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 Currently, pt able to complete transfers via Stedy CGA.  Able to complete several Transfers with Walker Min assist and able to initiate hopping on R LE few steps at a time in prep amb activities.  Cont with B LE Strength Exs, UE chair push-ups.  Pt reports no sign assist/support; given less than optimal home setting/set-up  would recommend cont PT (3-5) upon d/c.  Will cont to monitor pt's 
Physical Therapy  Facility/Department: [address] CVU  Physical Therapy Initial Assessment    Name: Chucky Gregory  : 1970  MRN: [id number]  Date of Service: 2023    Discharge Recommendations:  Continue to assess pending progress, Subacute/Skilled Nursing Facility   PT Equipment Recommendations  Other: Will monitor for potential equipt needs.  Chucky Gregory scored a  on the AM-PAC short mobility form. Current research shows that an AM-PAC score of 17 or less is typically not associated with a discharge to the patient's home setting. Based on the patient's AM-PAC score and their current functional mobility deficits, it is recommended that the patient have 3-5 sessions per week of Physical Therapy at d/c to increase the patient's independence.  Please see assessment section for further patient specific details.    If patient discharges prior to next session this note will serve as a discharge summary.  Please see below for the latest assessment towards goals.            Assessment   Body Structures, Functions, Activity Limitations Requiring Skilled Therapeutic Intervention: Decreased functional mobility ;Decreased strength;Decreased safe awareness;Decreased endurance;Decreased balance;Increased pain  Assessment: 54 y/o male admit 2023 with Gangrene R Foot, Sepsis, DKA. 2023 S/P R Above Ankle Guillotine Amp. Vasc Surg cont follow for evetual conversion to BKA/Closure.  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  At this time, pt requiring Min assist x 2 via Stedy; attempts Transfer with Walker Mod assist x 2 and unable to attempt amb.  Would recommend cont PT (3-5) upon d/c.  Will cont to monitor pt's progress.  Therapy Prognosis: Fair;Good  Decision Making: Medium Complexity  History: 54 y/o male admit 2023 with Gangrene R Foot, Sepsis, DKA. 2023 S/P R Above Ankle Guillotine 
Physical Therapy  [facility]  Physical Therapy Re-Eval/Daily Treatment Note     Name: Chucky Gregory  : 1970  MRN: [id number]  Date of Service: 2023    Discharge Recommendations:  Continue to assess pending progress, Subacute/Skilled Nursing Facility, IP Rehab   PT Equipment Recommendations  Other: Will monitor for potential equipt needs.      Patient Diagnosis(es): The primary encounter diagnosis was Diabetic ulcer of toe of right foot associated with type 2 diabetes mellitus, with necrosis of muscle (HCC). Diagnoses of Type 2 diabetes mellitus with foot ulcer, unspecified whether long term insulin use (HCC), Goals of care, counseling/discussion, Leukocytosis, unspecified type, Thrombocytosis, Hyponatremia, Hyperglycemia, Elevated erythrocyte sedimentation rate, Elevated C-reactive protein (CRP), Gangrene of foot (HCC), and Gangrene of right foot (HCC) were also pertinent to this visit.  Past Medical History:  has a past medical history of Abscess of hand, left, Cellulitis of left hand, Diabetes education, encounter for, DM (diabetes mellitus) (HCC), Infection due to Enterobacter cloacae, and Suppurative tenosynovitis of flexor tendon of left hand.  Past Surgical History:  has a past surgical history that includes hernia repair; Finger surgery (Left, 2021); Hand surgery (Left, 2021); Leg amputation below knee (Right, 2023); and Leg amputation below knee (Right, 2023).    Assessment   Body Structures, Functions, Activity Limitations Requiring Skilled Therapeutic Intervention: Decreased functional mobility ;Decreased strength;Decreased safe awareness;Decreased endurance;Decreased balance;Increased pain  Assessment: 54 y/o male admit 2023 with Gangrene R Foot, Sepsis, DKA. 2023 S/P R Above Ankle Guillotine Amp. Vasc Surg cont follow for eventual conversion to BKA/Closure.  PTA pt living alone in a camper with few steps to enter; reports 
Pt transferred from CVU to [facility]. Pt alert and oriented.   Surgical dressing intact to RLE- states pain is tolerable. Per CVU Dr Terrell will be in at 6am to change dressing.   VS completed. Skin assessment completed- buttocks red.  Telemetry verified with CM[facility]  Pt resting in bed with alarm on and call light within reach.  Electronically signed by Julia Cole RN on 12/20/23 at 5:59 PM EST    
Report called to asiya on 5 [address]. All questions answered, will monitor,   
Routine Consult to Critical Care, Dr HANSA Coffey, sent via [employer] as \"FYI\".  
VASCULAR    Unchanged overnight.  Comfortable with plans for R BK guillotine amputation.    VSS Afeb  Lungs clear  Heart RRR  R foot wrapped with calf swollen    A/P: Nonsalvagable R foot with diabetic infection   Jamaal amp today.     Valente Terrell  
VASCULAR  POD#1    Comfortable.    VSS Afeb  Dressing intact - no strike through  Wound - clean without undrained pus; early granulation. Skin healthy with markedly decreased edema    Labs reviewed  Na 129  WBC 17.6  Cultures pending    A/P: S/P guillotine amp R foot - sepsis controlled   Continue antibx - cultures pending.   Will do daily dressing changes by MD for now.   Up in chair with PT/OT   Low rate saline IVF for hyponatremia. Check BMPs daily.   Plan for amp closure pending.     Valente Terrell    
VASCULAR  POD#2    Comfortable.    VSS Afeb  Dressing intact R leg without strikethrough - undressed/redressed: edema decreased further; skin erythema resolved; no pus and granulation beginning    Culture  Beta strep only so far    A/P: S/P guillotine amp R foot - improving overall.   Adjust antibx further per ID    Saline wet to dry dressing changes BID by RN staff.   Anticipate revision of BKA in the next 3-5 days based on appearance and timing.      Valente Terrell    Addendum: Will proceed with revision of guillotine to formal R BKA tomorrow. Plan to leave skin open.  
Vascular surgeon    No acute events  Patient states pain worse last night compared to previous  Dressing in place, clean and dry  Replaced today with wet-to-dry dressing, wound with clean base and healthy bleeding tissue, minimal granulation tissue at this point  No signs of infection    Postop day 2 right BKA    Daily wet-to-dry dressing changes by MD  Maintain posterior splint  Will monitor wound, possible wound VAC placement later this week  P.o. pain control  PT/OT, mobilize  Further per primary  Vascular surgery will continue to follow    Burke Acevedo MD, [facility] Vascular and Endovascular Surgery  12/24/2023  12:13 PM    
Vascular surgeon    No acute events overnight  Pain controlled   No other complaints or concerns  Mobilizing well with assistance  Dressing in place, clean and dry  Dressing changed today, wound with clean base. Healthy tissue beneath dressing no signs of infection.     Postop day 1 right BKA    Daily wet-to-dry dressing changes by MD  Maintain posterior splint  Will monitor wound, possible wound VAC placement later this week  P.o. pain control  PT/OT, mobilize  Further per primary  Vascular surgery will continue to follow    Burke Acevedo MD, [facility] Vascular and Endovascular Surgery  12/23/2023  10:25 AM    
Vascular surgeon    No acute overnight  Pain well-controlled  Dressing changed today and wound appears appropriate for wound VAC.    Postop day 2 right BKA    Wound VAC placement today by wound care  Maintain posterior splint for now.  Prosthetics to place IPOP tomorrow   P.o. pain control  PT/OT, mobilize  Further per primary  Vascular surgery will continue to follow    Burke Acevedo MD, [facility] Vascular and Endovascular Surgery  12/26/2023  1:50 PM    
Vascular surgery    No acute events overnight  Pain controlled  VAC changed today per wound care  Drain removed today as it had minimal output over the last 48 hours  Working well with PT  Awaiting placement    S/p R BKA    IPOP in place  VAC changed today, wound healing appropriately  Drain removed without issue  Okay for dispo from vascular perspective once bed available at rehab  Futher per primary      Burke Acevedo MD, [facility] Vascular and Endovascular Surgery  12/29/2023  3:04 PM    
Vascular surgery    Patient seen and examined  No acute issues  IPOP placed today  No other issues    S/p R BKA    IPOP to be clam-shelled tomorrow  Vac change tomorrow vs Friday  Remove drain tomorrow  Continue PT  Futher per primary      Burke Acevedo MD, [facility] Vascular and Endovascular Surgery  12/27/2023  5:27 PM    
Wound vac tx to RLE intact. Pt compliant with elevating RLE. No c/o noted at this time.  
    Intake/Output Summary (Last 24 hours) at 12/22/2023 1611  Last data filed at 12/22/2023 1223  Gross per 24 hour   Intake 2130.74 ml   Output 1030 ml   Net 1100.74 ml        Vitals:   Vitals:    12/22/23 1428   BP: (!) 151/87   Pulse: 78   Resp:    Temp: 97.9 °F (36.6 °C)   SpO2: 97%       Ten point ROS reviewed negative, unless as noted above    Physical Exam:     GENERAL APPEARANCE: Not in any acute distress,  appears comfortable.   NECK: Supple, no JVD.  CARDIOVASCULAR: Regular rate and rhythm, no murmurs, rubs or gallops  LUNGS: Clear to auscultation bilaterally   ABDOMEN: normoactive bowel sounds, soft non-tender and non distended  MUSCULOSKELETAL: Right BKA, surgical dressing in place.   NEUROLOGIC: Alert and oriented x 3- grossly non focal exam, moving all extremities             Minimum 35 Minutes spent in preparation of following this patient including face to face encounter, discussions with the patient and/or family, medication reconciliation, and transition of care preparation.      Medications:   Medications:    ketorolac  15 mg IntraVENous Q6H    insulin glargine  20 Units SubCUTAneous Nightly    insulin lispro  3 Units SubCUTAneous TID WC    ampicillin-sulbactam  3,000 mg IntraVENous Q6H    insulin lispro  0-16 Units SubCUTAneous Q4H    metroNIDAZOLE  500 mg IntraVENous Q8H    sodium chloride flush  10 mL IntraVENous 2 times per day      Infusions:    dextrose      sodium chloride 5 mL/hr at 12/22/23 1535     PRN Meds: glucose, 4 tablet, PRN  dextrose bolus, 125 mL, PRN   Or  dextrose bolus, 250 mL, PRN  glucagon (rDNA), 1 mg, PRN  dextrose, , Continuous PRN  sodium chloride flush, 10 mL, PRN  sodium chloride, , PRN  potassium chloride, 40 mEq, PRN   Or  potassium alternative oral replacement, 40 mEq, PRN   Or  potassium chloride, 10 mEq, PRN  magnesium sulfate, 2,000 mg, PRN  ondansetron, 4 mg, Q4H PRN  polyethylene glycol, 17 g, Daily PRN  acetaminophen, 650 mg, Q4H PRN   Or  acetaminophen, 650 
RRR  Respiratory bilateral entry fair  Abdomen-soft, nontender, nondistended  Musculoskeletal-right BKA  CNS-AOx3, no focal deficit.  Medications:   Medications:    ketorolac  15 mg IntraVENous Q6H    insulin glargine  20 Units SubCUTAneous Nightly    [Held by provider] insulin lispro  3 Units SubCUTAneous TID WC    ampicillin-sulbactam  3,000 mg IntraVENous Q6H    insulin lispro  0-16 Units SubCUTAneous Q4H    metroNIDAZOLE  500 mg IntraVENous Q8H    sodium chloride flush  10 mL IntraVENous 2 times per day      Infusions:    dextrose      sodium chloride 5 mL/hr at 12/24/23 0537     PRN Meds: loperamide, 2 mg, 4x Daily PRN  glucose, 4 tablet, PRN  dextrose bolus, 125 mL, PRN   Or  dextrose bolus, 250 mL, PRN  glucagon (rDNA), 1 mg, PRN  dextrose, , Continuous PRN  sodium chloride flush, 10 mL, PRN  sodium chloride, , PRN  potassium chloride, 40 mEq, PRN   Or  potassium alternative oral replacement, 40 mEq, PRN   Or  potassium chloride, 10 mEq, PRN  magnesium sulfate, 2,000 mg, PRN  ondansetron, 4 mg, Q4H PRN  polyethylene glycol, 17 g, Daily PRN  acetaminophen, 650 mg, Q4H PRN   Or  acetaminophen, 650 mg, Q4H PRN  oxyCODONE, 5 mg, Q4H PRN   Or  oxyCODONE, 10 mg, Q4H PRN  morphine, 2 mg, Q2H PRN   Or  morphine, 4 mg, Q2H PRN          Electronically signed by Jonny Andrews MD on 12/24/2023 at 4:01 PM    
RRR  Respiratory bilateral entry fair  Abdomen-soft, nontender, nondistended  Musculoskeletal-right BKA  CNS-AOx3, no focal deficit.  Medications:   Medications:    ketorolac  15 mg IntraVENous Q6H    insulin glargine  20 Units SubCUTAneous Nightly    [Held by provider] insulin lispro  3 Units SubCUTAneous TID WC    ampicillin-sulbactam  3,000 mg IntraVENous Q6H    insulin lispro  0-16 Units SubCUTAneous Q4H    sodium chloride flush  10 mL IntraVENous 2 times per day      Infusions:    dextrose      sodium chloride 5 mL/hr at 12/25/23 0535     PRN Meds: loperamide, 2 mg, 4x Daily PRN  glucose, 4 tablet, PRN  dextrose bolus, 125 mL, PRN   Or  dextrose bolus, 250 mL, PRN  glucagon (rDNA), 1 mg, PRN  dextrose, , Continuous PRN  sodium chloride flush, 10 mL, PRN  sodium chloride, , PRN  potassium chloride, 40 mEq, PRN   Or  potassium alternative oral replacement, 40 mEq, PRN   Or  potassium chloride, 10 mEq, PRN  magnesium sulfate, 2,000 mg, PRN  ondansetron, 4 mg, Q4H PRN  polyethylene glycol, 17 g, Daily PRN  acetaminophen, 650 mg, Q4H PRN   Or  acetaminophen, 650 mg, Q4H PRN  oxyCODONE, 5 mg, Q4H PRN   Or  oxyCODONE, 10 mg, Q4H PRN  morphine, 2 mg, Q2H PRN   Or  morphine, 4 mg, Q2H PRN          Electronically signed by Jonny Andrews MD on 12/25/2023 at 10:50 AM    
tenosynovitis of flexor tendon of left hand        Past Surgical History:    Past Surgical History:   Procedure Laterality Date    FINGER SURGERY Left 09/27/2021    I&D done on left 3rd finger    HAND SURGERY Left 9/27/2021    LEFT HAND INCISION AND DRAINAGE MIDDLE FINGER performed by Rachel Lizarraga MD at [facility] OR    HERNIA REPAIR      LEG AMPUTATION BELOW KNEE Right 12/19/2023    RIGHT LEG BELOW KNEE GUILLOTINE AMPUTATION performed by Uday Terrell MD at [facility] OR    LEG AMPUTATION BELOW KNEE Right 12/22/2023    RIGHT BELOW KNEE AMPUTATION performed by Uday Terrell MD at [facility] OR       Current Medications:    No outpatient medications have been marked as taking for the 12/17/23 encounter (Hospital Encounter).       Allergies:  Atorvastatin and Metformin and related    Immunizations :   Immunization History   Administered Date(s) Administered    COVID-19, PFIZER PURPLE top, DILUTE for use, (age 12 y+), 30mcg/0.3mL 05/13/2021, 06/03/2021    DTaP vaccine 09/27/2021    Influenza, FLUCELVAX, (age 6 mo+), MDCK, PF, 0.5mL 10/08/2021    Td vaccine (adult) 03/17/1997         Social History:    Social History     Tobacco Use    Smoking status: Former     Current packs/day: 0.00     Types: Cigarettes     Quit date: 1/1/2020     Years since quitting: 3.9    Smokeless tobacco: Current     Types: Snuff   Vaping Use    Vaping Use: Never used   Substance Use Topics    Alcohol use: Yes     Alcohol/week: 4.0 standard drinks of alcohol     Types: 4 Cans of beer per week    Drug use: Never     Social History     Tobacco Use   Smoking Status Former    Current packs/day: 0.00    Types: Cigarettes    Quit date: 1/1/2020    Years since quitting: 3.9   Smokeless Tobacco Current    Types: Snuff      Family History   Problem Relation Age of Onset    No Known Problems Mother     No Known Problems Father          REVIEW OF SYSTEMS:     Constitutional:   fevers ++ chills++ , night sweats  Eyes:  negative for blurred vision, eye 
MD  Rounding Hospitalist    
filing. User may not have seen previous data.)  Bathroom Shower/Tub: Walk-in shower (Pt showers at [employer].  Simultaneous filing. User may not have seen previous data.)  Bathroom Toilet: Standard (Pt empties toilet (water not currently hooked up d/t winter season)  Simultaneous filing. User may not have seen previous data.)  Home Equipment:  (no DME  Simultaneous filing. User may not have seen previous data.)  ADL Assistance: Independent (Simultaneous filing. User may not have seen previous data.)  Homemaking Assistance: Independent (Simultaneous filing. User may not have seen previous data.)  Ambulation Assistance: Independent (Without assist device pta.  Simultaneous filing. User may not have seen previous data.)  Transfer Assistance: Independent (Simultaneous filing. User may not have seen previous data.)  Active : Yes (Simultaneous filing. User may not have seen previous data.)  Occupation: Unemployed (Simultaneous filing. User may not have seen previous data.)       Objective       Safety Devices  Type of Devices: Call light within reach;Chair alarm in place;Gait belt;Left in chair;Nurse notified           ADL  Grooming Skilled Clinical Factors: Seated to wash face  UE Bathing: Setup  UE Bathing Skilled Clinical Factors: Seated from commode  LE Bathing: Maximum assistance  LE Bathing Skilled Clinical Factors: washed lisa area w/wash cloth; dep buttocks. LLE not washed  UE Dressing: Minimal assistance  UE Dressing Skilled Clinical Factors: IV gown changed  LE Dressing: Maximum assistance  LE Dressing Skilled Clinical Factors: assist to don pull up briefs  Toileting: Maximum assistance;Dependent/Total  Toileting Skilled Clinical Factors: voided urine and BM on commode. Assist for clothes and hygiene. Stance on jose m stedy with CGA.  Functional Mobility Skilled Clinical Factors: Pt dependent for bed>commode>recliner via jose m stedy lift.  Skin Care: Soap and water        Bed 
glycol, 17 g, Daily PRN  acetaminophen, 650 mg, Q4H PRN   Or  acetaminophen, 650 mg, Q4H PRN  oxyCODONE, 5 mg, Q4H PRN   Or  oxyCODONE, 10 mg, Q4H PRN  morphine, 2 mg, Q2H PRN   Or  morphine, 4 mg, Q2H PRN        Electronically signed by Charlotte Clay MD on 12/20/2023 at 1:27 PM      
15  AM-PAC Inpatient T-Scale Score : 39.45  Mobility Inpatient CMS 0-100% Score: 57.7  Mobility Inpatient CMS G-Code Modifier : CK         Goals  Short Term Goals  Time Frame for Short Term Goals: Upon d/c acute care setting.  Short Term Goal 1: Bed Mob SBA.  1/2 Goal met.  Revised : Bed Mob Independent.  Short Term Goal 2: Transfers with assist device CGA. 1/2 Goal met.  Revised : Transfers with assist device SBA.  Short Term Goal 3: Amb with assist device 15-25' Min assist.  Short Term Goal 4: Pt participating in approp Strength Exs.  Patient Goals   Patient Goals : Return home.       Education  Patient Education  Education Given To: Patient  Education Provided Comments: Role of PT, POC, Need to call for assist,  Transfers/Stand Activities with Walker, LE Exs.  Education Method: Verbal;Demonstration  Barriers to Learning: None  Education Outcome: Verbalized understanding;Continued education needed      Therapy Time   Individual Concurrent Group Co-treatment   Time In 0840         Time Out 0915         Minutes 35                 Melisa Hill, PT         
assistance  Transfer Comments: To/from RW. Cues for hand placement.    Cognition  Overall Cognitive Status: WFL  Orientation  Overall Orientation Status: Within Functional Limits          Resistive Exercises: chair push ups completed 10 reps x1 bout.  Static Standing Balance Exercises: CGA x1 at walker for ADL's.  Education Given To: Patient  Education Provided: Role of Therapy;Plan of Care;Transfer Training;ADL Adaptive Strategies  Education Method: Verbal;Demonstration  Barriers to Learning: None  Education Outcome: Verbalized understanding;Demonstrated understanding;Continued education needed     AM-PAC - ADL  AM-PAC Daily Activity - Inpatient   How much help is needed for putting on and taking off regular lower body clothing?: A Lot  How much help is needed for bathing (which includes washing, rinsing, drying)?: A Lot  How much help is needed for toileting (which includes using toilet, bedpan, or urinal)?: A Lot  How much help is needed for putting on and taking off regular upper body clothing?: A Little  How much help is needed for taking care of personal grooming?: A Little  How much help for eating meals?: None  AM-PAC Inpatient Daily Activity Raw Score: 16  AM-PAC Inpatient ADL T-Scale Score : 35.96  ADL Inpatient CMS 0-100% Score: 53.32  ADL Inpatient CMS G-Code Modifier : CK      Goals  Short Term Goals  Time Frame for Short Term Goals: Prior to d/c.  Status: goals ongoing 1/2/24  Short Term Goal 1: Pt will bathe with min A.  Short Term Goal 2: Pt will dress with min A.  Short Term Goal 3: Pt will toilet with min A.  Short Term Goal 4: Pt will complete fxl mobility and fxl transfers to/from ADL surfaces with CGA- goal met 12/28  Short Term Goal 5: Pt will tolerate standing >3 minutes for functional task with CGA/min A to improve standing tolerance for ADL routine.  Long Term Goals  Time Frame for Long Term Goals : STGs=LTGs  Patient Goals   Patient goals : none stated       Therapy Time   Individual 
    Dilip SHORE/L,Danuta    OTR was consulted with this patients treatment/intervention plan.        
12/17/23 1434       Blood Culture:   Lab Results   Component Value Date/Time    BC No Growth after 4 days of incubation. 12/19/2023 04:37 AM    BLOODCULT2 No Growth after 4 days of incubation. 12/19/2023 04:37 AM       Viral Culture:    Lab Results   Component Value Date/Time    COVID19 Not Detected 09/27/2021 02:00 PM     Urine Culture: No results for input(s): \"LABURIN\" in the last 72 hours.    Scheduled Meds:   cefTRIAXone (ROCEPHIN) IV  2,000 mg IntraVENous Q24H    metroNIDAZOLE  500 mg Oral 3 times per day    lidocaine 1 % injection  5 mL IntraDERmal Once    sodium chloride flush  5-40 mL IntraVENous 2 times per day    insulin glargine  20 Units SubCUTAneous Nightly    [Held by provider] insulin lispro  3 Units SubCUTAneous TID WC    insulin lispro  0-16 Units SubCUTAneous Q4H    sodium chloride flush  10 mL IntraVENous 2 times per day       Continuous Infusions:   sodium chloride      dextrose      sodium chloride 5 mL/hr at 12/26/23 0613       PRN Meds:  sodium chloride flush, sodium chloride, loperamide, glucose, dextrose bolus **OR** dextrose bolus, glucagon (rDNA), dextrose, sodium chloride flush, sodium chloride, potassium chloride **OR** potassium alternative oral replacement **OR** potassium chloride, magnesium sulfate, ondansetron, polyethylene glycol, acetaminophen **OR** acetaminophen, oxyCODONE **OR** oxyCODONE, morphine **OR** morphine    Imaging:   CT FOOT RIGHT W CONTRAST   Final Result   Extensive soft tissue emphysema which appears to originate from the forefoot   in extend into the foot and distal leg related to underlying infection.  The   possibility of necrotizing fasciitis should be considered in the differential   diagnosis.      Intramedullary air involving multiple lateral foot bones described above.      No localized collection identified.      Osteoarthrosis.         XR FOOT RIGHT (MIN 3 VIEWS)   Final Result   Extensive subcutaneous gas.  Gas gangrene should be considered in the 
   Must consult bariatric surgery as out patient    Diabetes is uncontrolled last A1C mid December was  10.9   Ct Basal and sliding scale insulin    Optimize BP control    Awaiting pre-cert and NH placement         
  possibility of necrotizing fasciitis should be considered in the differential   diagnosis.      Intramedullary air involving multiple lateral foot bones described above.      No localized collection identified.      Osteoarthrosis.         XR FOOT RIGHT (MIN 3 VIEWS)   Final Result   Extensive subcutaneous gas.  Gas gangrene should be considered in the correct   clinical setting.  There is no obvious fracture, dislocation or   osteomyelitis.  The osseous structures, especially the metatarsal heads are   partially obscured by the soft tissue gas.             All pertinent images and reports for the current Hospitalization were reviewed by me.    IMPRESSION:    Patient Active Problem List   Diagnosis Code    Elevated C-reactive protein (CRP) R79.82    Class 1 obesity due to excess calories with body mass index (BMI) of 34.0 to 34.9 in adult E66.09, Z68.34    Weight loss counseling, encounter for Z71.3    DMII (diabetes mellitus, type 2) ([facility]) E11.9    Obesity, diabetes, and hypertension syndrome ([facility]) E11.69, E66.9, E11.59, I15.2    Flexor tenosynovitis of finger M65.9    Gangrene of right foot ([facility]) I96    Sepsis ([facility]) A41.9    Tachycardia R00.0    Tachypnea R06.82    Neutrophilic leukocytosis D72.9    Hyponatremia E87.1    Morbid obesity due to excess calories ([facility]) E66.01    Essential hypertension I10    Hyperlipidemia E78.5    High anion gap metabolic acidosis E87.29    Diabetic ulcer of toe of right foot associated with type 2 diabetes mellitus, with necrosis of muscle ([facility]) E11.621, L97.513    Bacteremia due to group B Streptococcus R78.81, B95.1    Diabetic wet gangrene of the foot ([facility]) E11.52    Diabetic ketoacidosis without coma associated with type 2 diabetes mellitus ([facility]) E11.10    Gangrene of foot ([facility]) I96        ICD-10-CM    1. Diabetic ulcer of toe of right foot associated with type 2 diabetes mellitus, with necrosis of muscle ([facility])  E11.621     L97.513       2. Type 2 diabetes mellitus with foot 
heads are   partially obscured by the soft tissue gas.             All pertinent images and reports for the current Hospitalization were reviewed by me.    IMPRESSION:    Patient Active Problem List   Diagnosis Code    Elevated C-reactive protein (CRP) R79.82    Class 1 obesity due to excess calories with body mass index (BMI) of 34.0 to 34.9 in adult E66.09, Z68.34    Weight loss counseling, encounter for Z71.3    DMII (diabetes mellitus, type 2) ([facility]) E11.9    Obesity, diabetes, and hypertension syndrome ([facility]) E11.69, E66.9, E11.59, I15.2    Flexor tenosynovitis of finger M65.9    Gangrene of right foot ([facility]) I96    Sepsis ([facility]) A41.9    Tachycardia R00.0    Tachypnea R06.82    Neutrophilic leukocytosis D72.9    Hyponatremia E87.1    Morbid obesity due to excess calories ([facility]) E66.01    Essential hypertension I10    Hyperlipidemia E78.5    High anion gap metabolic acidosis E87.29    Diabetic ulcer of toe of right foot associated with type 2 diabetes mellitus, with necrosis of muscle ([facility]) E11.621, L97.513    Bacteremia due to group B Streptococcus R78.81, B95.1    Diabetic wet gangrene of the foot ([facility]) E11.52    Diabetic ketoacidosis without coma associated with type 2 diabetes mellitus ([facility]) E11.10    Gangrene of foot ([facility]) I96        ICD-10-CM    1. Diabetic ulcer of toe of right foot associated with type 2 diabetes mellitus, with necrosis of muscle ([facility])  E11.621     L97.513       2. Type 2 diabetes mellitus with foot ulcer, unspecified whether long term insulin use ([facility])  E11.621     L97.509       3. Goals of care, counseling/discussion  Z71.89       4. Leukocytosis, unspecified type  D72.829       5. Thrombocytosis  D75.839       6. Hyponatremia  E87.1       7. Hyperglycemia  R73.9       8. Elevated erythrocyte sedimentation rate  R70.0       9. Elevated C-reactive protein (CRP)  R79.82       10. Gangrene of foot ([facility])  I96 Surgical Pathology     Surgical Pathology     Culture, Fungus     Culture, Fungus

## 2024-01-02 NOTE — CARE COORDINATION
[facility]  Follow-up Progress Note       NAME:  Chucky Gregory  MEDICAL RECORD NUMBER:  [id number]  AGE:  53 y.o.   GENDER:  male  :  1970  TODAY'S DATE:  2023    Subjective:   s/p R BKA 23 By Dr. Terrell. Incision site appropriate for vac placement today per Dr. Acevedo.   Wound vac dressing change today.    Objective:    BP (!) 151/101 Comment: Rn informed  Pulse 94   Temp 99.2 °F (37.3 °C) (Oral)   Resp 18   Ht 1.676 m (5' 5.98\")   Wt 104.7 kg (230 lb 13.2 oz)   SpO2 94%   BMI 37.27 kg/m²   Saúl Risk Score: Saúl Scale Score: 15  Assessment:   Measurements:  Negative Pressure Wound Therapy Knee Right (Active)   $ Standard NPWT >50 sq cm PER TX $ Yes 23 1349   Wound Type Surgical 23 1349   Unit Type ulta 23 1349   Dressing Type Black Foam 23 1349   Number of pieces used 1 23 1349   Number of pieces removed 1 23 1349   Cycle Continuous 23 1349   Target Pressure (mmHg) 125 23 1349   Intensity 5 23 1349   Canister changed? No 23 0421   Dressing Status New dressing applied 23 1349   Dressing Changed Changed/New 23 1349   Drainage Amount None 23 1349   Drainage Description Serosanguinous 23 1349   Dressing Change Due 24 1349   Output (ml) 50 ml 23 2015   Number of days: 3       Wound 23 Knee Right (Active)   Wound Image   23 1349   Wound Etiology Non-Healing Surgical 23 1349   Dressing Status New dressing applied 23 1349   Wound Cleansed Cleansed with saline 23 1349   Dressing/Treatment Negative pressure wound therapy 23 1349   Dressing Change Due 24 1349   Wound Length (cm) 3.5 cm 23 1323   Wound Width (cm) 22 cm 23 1323   Wound Depth (cm) 1 cm 23 1323   Wound Surface Area (cm^2) 77 cm^2 23 1323   Wound Volume (cm^3) 77 cm^3 23 1323   Wound Assessment 
     [facility]  Consult Note       NAME:  Chucky Gregory  MEDICAL RECORD NUMBER:  [id number]  AGE: 53 y.o.   GENDER: male  : 1970  TODAY'S DATE:  2023    Subjective   Reason for WOCN Evaluation and Assessment: wound vac placement      Chucky Gregory is a 53 y.o. male referred by:   [x] Physician  [] Nursing  [] Other:     Wound Identification:  Wound Type: surgical  Contributing Factors: diabetes    Wound History: s/p R BKA 23 By Dr. Terrell. Incision site appropriate for vac placement today per Dr. Acevedo.  Current Wound Care Treatment:  n/a    Patient Goal of Care:  [x] Wound Healing  [] Odor Control  [] Palliative Care  [] Pain Control   [] Other:         PAST MEDICAL HISTORY        Diagnosis Date    Abscess of hand, left     Cellulitis of left hand 2021    Diabetes education, encounter for     DM (diabetes mellitus) ([facility]) 2021    Infection due to Enterobacter cloacae     Suppurative tenosynovitis of flexor tendon of left hand        PAST SURGICAL HISTORY    Past Surgical History:   Procedure Laterality Date    FINGER SURGERY Left 2021    I&D done on left 3rd finger    HAND SURGERY Left 2021    LEFT HAND INCISION AND DRAINAGE MIDDLE FINGER performed by Rachel Lizarraga MD at [facility] OR    HERNIA REPAIR      LEG AMPUTATION BELOW KNEE Right 2023    RIGHT LEG BELOW KNEE GUILLOTINE AMPUTATION performed by Uday Terrell MD at [facility] OR    LEG AMPUTATION BELOW KNEE Right 2023    RIGHT BELOW KNEE AMPUTATION performed by Uday Terrell MD at [facility] OR       FAMILY HISTORY    Family History   Problem Relation Age of Onset    No Known Problems Mother     No Known Problems Father        SOCIAL HISTORY    Social History     Tobacco Use    Smoking status: Former     Current packs/day: 0.00     Types: Cigarettes     Quit date: 2020     Years since quitting: 3.9    Smokeless tobacco: Current     Types: Snuff   Vaping Use    Vaping Use: Never used 
   12/19/23 1924   Service Assessment   Patient Orientation Alert and Oriented   Cognition Alert   History Provided By Patient   Primary Caregiver Self   Support Systems Family Members  (Uncle lets him stay in his camper, gets him food)   Patient's Healthcare Decision Maker is: Legal Next of Kin   PCP Verified by CM No  (Has not seen a pcp since 8-2022)   Prior Functional Level Independent in ADLs/IADLs;Bathing;Dressing;Toileting;Feeding;Cooking;Housework;Shopping;Mobility   Current Functional Level Assistance with the following:;Bathing;Dressing;Toileting;Cooking;Housework;Shopping;Mobility   Can patient return to prior living arrangement No   Ability to make needs known: Good   Family able to assist with home care needs: Other (comment)  (He wants to talk with them about going to a relative's house)   Would you like for me to discuss the discharge plan with any other family members/significant others, and if so, who? No   Financial Resources None  (He states he has no insurance.)   Community Resources None   CM/SW Referral No PCP;Financial;New life changing diagnosis   Discharge Planning   Type of Residence Other (Comment)  (lives alone in his uncle's camper that has 3 steps)   Current Services Prior To Admission None   Potential Assistance Needed Durable Medical Equipment;Meals On Wheels;Emergency Call  System;Skilled Nursing Facility;Prescription Assistance;Other (Comment)  (New PCP)   DME Ordered? No   Potential Assistance Purchasing Medications Yes   Type of Home Care Services None   Patient expects to be discharged to: Skilled nursing facility   One/Two Story Residence One story   History of falls? 0   Services At/After Discharge   Transition of Care Consult (CM Consult) SNF   Partner SNF Yes   Services At/After Discharge Community Resources;DME;Home Health;Skilled Nursing Facility (SNF)    Resource Information Provided? No   Mode of Transport at Discharge Other (see comment)  (to be determined)       
Call received from patient's Aunt (Estrella) [phone] regarding questions about discharge. Spoke with patient who gave permission to speak with Pat however states \"she isn't making any decisions\". Patient states that he prefers [facility] at discharge however is agreeable to [facility] if needed until bed available at [place]. Spoke with Pat regarding the above. Pat states that she would prefer patient be closer to the [place] such as [facility] or [facility] but states that she will talk to family and patient before requesting referrals. Pat also expressed concern that patient does not have anywhere to go after therapy and could potentially need Long Term Care.  As of now plan remains for patient to discharge to [facility] or [facility]. Pat made aware that patient is able to make his own decisions and will need to agree to any additional referrals being made, Pat voiced understanding. Will need LARS arellano prior to discharge.   Electronically signed by HOANG Evangelista on 12/26/2023 at 12:04 PM    
Chart Reviewed.  Amp closure date is pending.  Dr Falk suggests long term IV abx at discharge.  Confirmed he does have [employer] Medicaid.  HOANG Crockett     Case Management   [phone]    12/20/2023  10:30 AM    
Chart Reviewed.  Barriers to DC:      Lives alone,       Foot amputation 12-.      Diabetes, no medication taken since 9/2023      No insurance.      No PCP visits since 8/2022.      Limited family support      No DME.       Three steps entry into a camper that is his uncles.    Met with patient today to discuss dc planning needs.  He states he wants to talk with his family first about going to their house.  Informed him that due to his amputation, he will need 24 hour, hands on care for all meals, toileting and care needs.  Did give him information regarding SNF where he would have 24 hour care and do some therapy to get stronger before going to a relative's house.  He reports he will talk with his family first but he will only go in the [place] if he goes SNF.  He confirmed he does not have any insurance and if he does, it would be news to him that he has it.  HOANG Crockett     Case Management   [phone]    12/19/2023  2:35 PM    
I am seeing prior notes that indicate family may want [facility] or [facility]". [facility] has an age limit of 65 and older. \[facility]\"" doesn't accept patient's insurance.    Electronically signed by Kamila Markham RN Case Management on 12/27/2023 at 10:25 AM    
LDA placed for possible stage 2 to gluteal cleft. Pt sitting up in chair. Spoke with bedside RN, wound more linear, mostly moisture. Would recommend triad barrier if incontinent. If pt continent sacral border. Per bedside RN, pt also refusing turns. Please limit chair intervals, offload q1 hour while in chair.   Continuing to follow for vac therapy.  Electronically signed by Cecelia Mckenzie RN CWOCN on 12/26/2023 at 2:11 PM    
[facility] cannot accept in near future but could accept him at [place] and then transfer to [place] when bed opens.   This will need to be discussed with patient.    Chart reflects a revision of BKA will be done in 3-5 days.    Updated RUBEN Kruger Case Manager.  HOANG Crockett     Case Management   [phone]    12/21/2023  8:44 AM    
Met with patient & brothers at bedside. Agree to patient getting rehab at [place]. Brother asking if patient candidate for rehab here. Explained we would have to have PT/OT eval recommendation for this & informed previous recs were for skilled nursing facility.   SNF & ARU options provided to patient/family.   If PT/OT recs ARU, pt/family would like [facility]; if [facility] rec'd [facility] if no [facility]).   Await updated PT/OT eval. RN messaging for physician for new therapy orders.   Will need pre cert for ARU vs SNF.     The Plan for Transition of Care is related to the following treatment goals: strengthening    The patient & family were provided with a choice of provider and agrees   with the discharge plan. [x] Yes [] No    Freedom of choice list was provided with basic dialogue that supports the patient's individualized plan of care/goals, treatment preferences and shares the quality data associated with the providers. [x] Yes [] No      Electronically signed by Kamila Markham RN Case Management on 12/22/2023 at 3:44 PM    
Met with patient & spoke to his Uncle Michael. Both in agreement with [facility] at [facility] (or [facility] if no bed).   Spoke to Bhavana at [facility], she will submit for pre cert today.    Electronically signed by Kamila Markham RN Case Management on 12/28/2023 at 12:41 PM    
Pt discharging. Wound vac dressing removed. Wet to dry dressing placed.      Electronically signed by Cecelia Mckenzie RN CWOCN  on 1/2/2024 at 11:55 AM    
Spoke to Bhavana at [facility], pre cert has been obtained.   Silas MACDONALD that [facility] approved.    Electronically signed by Kamila Markham RN Case Management on 1/2/2024 at 9:55 AM    
Spoke to Bhavana at [facility], pre cert is pending. Bhavana did say medicaid bed still unavailable at [place] so patient will go to [facility] until bed opens, pt/family made aware & in agreement with this. Facility is aware of need for IVAB & wound vac.    Electronically signed by Kamila Markham RN Case Management on 12/29/2023 at 1:20 PM    
Spoke with Financial Counselor, Autumn, who looked up his insurance.  He has [employer] Medicaid since 2-1-2023 and is still active.    Returned to the room to inform him .  He continues to deny that he has it but he also states he does not have a valid address so he would not have had any mail about it.  He will talk with his relative about going to their home and would need 24 hour care.  Provided him with CMS star rated list of SNF agencies in the event relative cannot provide 24 hour care for him.   He was agreeable to a referral to [facility] as he only wants to be in [place], [place].  Referral initiated to [facility] rep.  HOANG Crocktet     Case Management   [phone]    12/19/2023  3:37 PM      The Plan for Transition of Care is related to the following treatment goals: to continue his progress in personal care, ambulation, transfers, wound care management, IV therapies in SNF setting    The Patient  was provided with a choice of provider and agrees   with the discharge plan. [x] Yes [] No    Freedom of choice list was provided with basic dialogue that supports the patient's individualized plan of care/goals, treatment preferences and shares the quality data associated with the providers. [x] Yes [] No      HOANG Crockett     Case Management   [phone]    12/19/2023  3:39 PM          
Wound vac dressing currently covered with ipop cast. Unable to change vac dressing today. Will attempt to change dressing later today or tomorrow.   Electronically signed by Cecelia Mckenzie RN CWOCN on 12/28/2023 at 11:53 AM    
[x] Wound Vac dressing removed  [x] Moist NS dressing applied  OR   []   Home vac dressing applied  [x] Collection chamber removed and placed in red bag  [x] Wound Vac placed in clear plastic bag and placed in dirty hold  [x] Supply chain called at [phone]  to informed that Vac needs to be picked up  Electronically signed by Cecelia Mckenzie RN CWOCN on 1/2/2024 at 11:55 AM        
  Not Indicated    Additional CM Notes: Patient & Uncle Michael aware and in agreement with DC to [facility] with eventual placement at [facility] pending an open bed. RUBEN Helms aware of dc plan. Bhavana at facility aware of timing & able to pull orders via Epic.    The Plan for Transition of Care is related to the following treatment goals of Diabetic ulcer of toe of right foot associated with type 2 diabetes mellitus, with necrosis of muscle ([facility]) [E11.621, L97.513]  Gangrene of right foot ([facility]) [I96]  Type 2 diabetes mellitus with foot ulcer, unspecified whether long term insulin use ([facility]) [E11.621, L97.509]    The Patient and/or patient representative Chucky and his family were provided with a choice of provider and agrees with the discharge plan Yes    Freedom of choice list was provided with basic dialogue that supports the patient's individualized plan of care/goals and shares the quality data associated with the providers. Yes    Kamila Markham RN  [facility]   Case Management Department  Ph: [phone]

## 2024-01-02 NOTE — DISCHARGE SUMMARY
Attempted to call facility twice with no answer.  VSS  PICC left in for DC  Tele removed  Pt DC with belongings  
Vandana Flowers,  within 2 weeks  Diet: diabetic diet   Activity: activity as tolerated  Disposition: Discharged to:   []Home, []HHC, [x]SNF, []Acute Rehab, []Hospice   Condition on discharge: Stable  Labs and Tests to be Followed up as an outpatient by PCP or Specialist:     Discharge Medications:        Medication List        START taking these medications      acetaminophen 325 MG tablet  Commonly known as: TYLENOL  Take 2 tablets by mouth every 4 hours as needed for Pain     insulin glargine 100 UNIT/ML injection vial  Commonly known as: LANTUS  Inject 20 Units into the skin nightly     insulin lispro 100 UNIT/ML Soln injection vial  Commonly known as: HUMALOG  Inject 4 Units into the skin 3 times daily (with meals)     metroNIDAZOLE 500 MG tablet  Commonly known as: FLAGYL  Take 1 tablet by mouth every 8 hours for 15 days               Where to Get Your Medications        Information about where to get these medications is not yet available    Ask your nurse or doctor about these medications  acetaminophen 325 MG tablet  insulin glargine 100 UNIT/ML injection vial  insulin lispro 100 UNIT/ML Soln injection vial  metroNIDAZOLE 500 MG tablet        Objective Findings at Discharge:   /82   Pulse 84   Temp 97.6 °F (36.4 °C) (Oral)   Resp 18   Ht 1.676 m (5' 5.98\")   Wt 106 kg (233 lb 11 oz)   SpO2 95%   BMI 37.74 kg/m²       Physical Exam:     General: NAD  Eyes: EOMI  ENT: neck supple  Cardiovascular: Regular rate.  Respiratory: Clear to auscultation  Gastrointestinal: Soft, non tender  Genitourinary: no suprapubic tenderness  Musculoskeletal: No edema  Skin: warm, dry  Neuro: Alert.  Psych: Mood appropriate.         Labs and Imaging   No results found.    CBC: No results for input(s): \"WBC\", \"HGB\", \"PLT\" in the last 72 hours.  BMP:    Recent Labs     12/31/23  1000      K 4.2      CO2 28   BUN 22*   CREATININE 0.8*   GLUCOSE 140*     Hepatic: No results for input(s): \"AST\", \"ALT\",

## 2024-01-03 ENCOUNTER — TELEPHONE (OUTPATIENT)
Dept: INFECTIOUS DISEASES | Age: 54
End: 2024-01-03

## 2024-01-03 ENCOUNTER — TELEPHONE (OUTPATIENT)
Dept: VASCULAR SURGERY | Age: 54
End: 2024-01-03

## 2024-01-03 DIAGNOSIS — B95.1 BACTEREMIA DUE TO GROUP B STREPTOCOCCUS: Primary | ICD-10-CM

## 2024-01-03 DIAGNOSIS — R78.81 BACTEREMIA DUE TO GROUP B STREPTOCOCCUS: Primary | ICD-10-CM

## 2024-01-08 LAB
FUNGUS SPEC CULT: NORMAL
LOEFFLER MB STN SPEC: NORMAL

## 2024-01-09 NOTE — ED NOTES
[facility] Coroners office called. This RN spoke with Nikki. Pt is not a coroners case.      Merrill Culver, RN  01/09/24 [id number]

## 2024-01-09 NOTE — ED NOTES
[facility] called at this time. Pt is a possible candidate for donation. Instructed not to release body to  home at this time. Pt's family decided to use [facility] in [facility].      Merrill Culver, RN  24 [phone]

## 2024-01-09 NOTE — CODE DOCUMENTATION
Pt noted to be in complete heart block at 1117 and lost pulses at 1119. CPR resumed via yan at this time.

## 2024-01-09 NOTE — ED PROVIDER NOTES
THE [facility]  EMERGENCY DEPARTMENT ENCOUNTER          ATTENDING PHYSICIAN NOTE       Date of evaluation: 1/9/2024    Chief Complaint     Loss of Consciousness      History of Present Illness     Chucky Gregory is a 53 y.o. male who presents to the emergency department by EMS for a syncopal event.  The patient has a complex past medical history that includes insulin-dependent type 2 diabetes mellitus, and a recent hospitalization from December 17 through January 2 for gangrene of the right foot leading to sepsis and group B strep bacteremia.  He underwent a right BKA with vascular surgery on December 22, and was discharged to acute rehab for ongoing IV antibiotics through PICC line.  The patient generally well at rehab, and was at physical therapy today, when he suddenly became diaphoretic and passed out.    On arrival to the emergency department, the patient is awake, and appears in extremis, is grossly diaphoretic, states that he feels weak and lightheaded and short of breath.  When he is placed on the monitor he appears to be in ventricular tachycardia with a rate in the 160s, although with pressure in the 130s over 50s.  Staff are immediately directed to transfer the patient to the resuscitation bay, and pads were placed.      Given the patient's current extremis and distress, further history is not obtainable.    Review of Systems     Review of Systems   Unable to perform ROS: Acuity of condition       Past Medical, Surgical, Family, and Social History     He has a past medical history of Abscess of hand, left, Cellulitis of left hand, Diabetes education, encounter for, DM (diabetes mellitus) (HCC), Infection due to Enterobacter cloacae, and Suppurative tenosynovitis of flexor tendon of left hand.  He has a past surgical history that includes hernia repair; Finger surgery (Left, 09/27/2021); Hand surgery (Left, 9/27/2021); Leg amputation below knee (Right, 12/19/2023); and Leg amputation below knee (Right,

## 2024-01-09 NOTE — PROGRESS NOTES
01/09/24 1347   Encounter Summary   Encounter Overview/Reason  Spiritual/Emotional Needs;Grief, Loss, and Adjustments   Service Provided For: Patient and family together  (Family at bedside)   Referral/Consult From: Nurse   Support System Spouse;Children;Family members   Last Encounter  01/09/24  (Comfort, family support, prayer ke 1/9/24)   Complexity of Encounter High   Begin Time 1225   End Time  1300   Total Time Calculated 35 min   Spiritual/Emotional needs   Type Emotional Distress;Spiritual Support   Grief, Loss, and Adjustments   Type End of Life;Grief and loss;Life Adjustments;Death;Bereavement Care   Assessment/Intervention/Outcome   Assessment Sad;Shock;Tearful   Intervention Active listening;Discussed belief system/Yazdanism practices/azael;Discussed death, afterlife;End of Life Care;Explored/Affirmed feelings, thoughts, concerns;Prayer (assurance of)/[place];Sustaining Presence/Ministry of presence     Rev Ned Davis

## 2024-01-09 NOTE — CODE DOCUMENTATION
Pt was successfully intubated at 1100. Secured 24 at the teeth. Breath sounds auscultated, + color change. Pt being bagged by RT. Pt lost pulses at this time and CPR started via yan.

## 2024-01-09 NOTE — CODE DOCUMENTATION
Pt blinking at this time and has spontaneous breathing over being bagged. Pt remains in V tachy on monitor.  Pt does have LMA placed at this time being bagged through that. Plan to intubate and shock at 200J after sedation meds.

## 2024-01-10 ENCOUNTER — TELEPHONE (OUTPATIENT)
Dept: INTERNAL MEDICINE CLINIC | Age: 54
End: 2024-01-10

## 2024-01-13 LAB — BACTERIA BLD CULT: NORMAL

## 2024-01-15 LAB
FUNGUS SPEC CULT: NORMAL
LOEFFLER MB STN SPEC: NORMAL
TSH SERPL DL<=0.005 MIU/L-ACNC: 1.4 UIU/ML (ref 0.27–4.2)

## 2024-01-22 LAB
FUNGUS SPEC CULT: NORMAL
LOEFFLER MB STN SPEC: NORMAL

## (undated) DEVICE — T-DRAPE,EXTREMITY,STERILE: Brand: MEDLINE

## (undated) DEVICE — DRAPE,U/ SHT,SPLIT,PLAS,STERIL: Brand: MEDLINE

## (undated) DEVICE — SPONGE LAP W18XL18IN WHT COT 4 PLY FLD STRUNG RADPQ DISP ST 2 PER PACK

## (undated) DEVICE — GAUZE,SPONGE,4"X4",8PLY,STRL,LF,10/TRAY: Brand: MEDLINE

## (undated) DEVICE — SWAB CULT SGL AMIES W/O CHAR FOR THRT VAG SKIN HRT CULTSWAB

## (undated) DEVICE — COLLECTOR SPEC RAYON LIQ STUART DBL FOR THRT VAG SKIN WND

## (undated) DEVICE — PAD,NON-ADHERENT,3X8,STERILE,LF,1/PK: Brand: MEDLINE

## (undated) DEVICE — SUTURE VCRL + SZ 2-0 L18IN ABSRB UD CT1 L36MM 1/2 CIR VCP839D

## (undated) DEVICE — TRAY PREP DRY W/ PREM GLV 2 APPL 6 SPNG 2 UNDPD 1 OVERWRAP

## (undated) DEVICE — BANDAGE COMPR W3INXL15FT BGE E SGL LAYERED CLP CLSR

## (undated) DEVICE — SHEET,DRAPE,53X77,STERILE: Brand: MEDLINE

## (undated) DEVICE — TOWEL,OR,DSP,ST,BLUE,STD,4/PK,20PK/CS: Brand: MEDLINE

## (undated) DEVICE — TUBING, SUCTION, 1/4" X 12', STRAIGHT: Brand: MEDLINE

## (undated) DEVICE — SOLUTION IRRIG 1000ML 0.9% SOD CHL USP POUR PLAS BTL

## (undated) DEVICE — BANDAGE ELASTIC 5YDX4IN ST COMPRSS LF NON ADH

## (undated) DEVICE — 2108 SERIES SAGITTAL BLADE FLARED, OFFSET  (31.0 X 0.64 X 63.0MM)

## (undated) DEVICE — BANDAGE,GAUZE,BULKEE II,4.5"X4.1YD,STRL: Brand: MEDLINE

## (undated) DEVICE — 3M™ COBAN™ NL STERILE NON-LATEX SELF-ADHERENT WRAP, 2082S, 2 IN X 5 YD (5 CM X 4,5 M), 36 ROLLS/CASE: Brand: 3M™ COBAN™

## (undated) DEVICE — SUTURE CHROMIC GUT SZ 3-0 L27IN ABSRB BRN L26MM SH 1/2 CIR G122H

## (undated) DEVICE — GLOVE ORTHO 8   MSG9480

## (undated) DEVICE — MAJOR SET UP: Brand: MEDLINE INDUSTRIES, INC.

## (undated) DEVICE — STOCKINETTE,IMPERVIOUS,12X48,STERILE: Brand: MEDLINE

## (undated) DEVICE — GLOVE,SURG,SENSICARE SLT,LF,PF,7: Brand: MEDLINE

## (undated) DEVICE — GLOVE ORANGE PI 8   MSG9080

## (undated) DEVICE — GLOVE SURG SZ 65 L12IN THK75MIL DK GRN LTX FREE

## (undated) DEVICE — PACK PROCEDURE SURG EXTREMITY MFFOP CUST

## (undated) DEVICE — PAD,ABDOMINAL,8"X7.5",STERILE,LF,1/PK: Brand: MEDLINE

## (undated) DEVICE — DRAPE HND W114XL142IN BLU POLYPR W O PCH FEN CRD AND TB HLDR

## (undated) DEVICE — BANDAGE COMPR W2INXL5YD TAN BRTH SELF ADH WRP W/ HND TEAR

## (undated) DEVICE — SUTURE CHROMIC GUT SZ 3-0 L54IN ABSRB BRN LIGAPAK DISPNS L112G

## (undated) DEVICE — HYPODERMIC SAFETY NEEDLE: Brand: MAGELLAN

## (undated) DEVICE — SOLUTION IRRIG 500ML 0.9% SOD CHL USP POUR PLAS BTL

## (undated) DEVICE — SUTURE PERMA-HAND SZ 2-0 L30IN NONABSORBABLE BLK L26MM SH K833H

## (undated) DEVICE — WAX SURG 2.5GM HEMSTAT BNE BEESWAX PARAFFIN ISO PALMITATE

## (undated) DEVICE — SPONGE GZ W4XL4IN COT 12 PLY TYP VII WVN C FLD DSGN STERILE

## (undated) DEVICE — INTENDED FOR TISSUE SEPARATION, AND OTHER PROCEDURES THAT REQUIRE A SHARP SURGICAL BLADE TO PUNCTURE OR CUT.: Brand: BARD-PARKER ® STAINLESS STEEL BLADES

## (undated) DEVICE — BANDAGE GZ W2XL75IN ST RAYON POLY CNFRM STRTCH LTWT

## (undated) DEVICE — MEDICINE CUP, GRADUATED, STER: Brand: MEDLINE

## (undated) DEVICE — GLOVE SURG SZ 65 THK91MIL LTX FREE SYN POLYISOPRENE

## (undated) DEVICE — SUTURE PROL SZ 3-0 L36IN NONABSORBABLE BLU L26MM SH 1/2 CIR 8522H

## (undated) DEVICE — MERCY HEALTH WEST TURNOVER: Brand: MEDLINE INDUSTRIES, INC.

## (undated) DEVICE — DRESSING PETRO W3XL3IN OIL EMUL N ADH GZ KNIT IMPREG CELOS

## (undated) DEVICE — SYRINGE, LUER LOCK, 10ML: Brand: MEDLINE